# Patient Record
Sex: FEMALE | Race: ASIAN | NOT HISPANIC OR LATINO | Employment: FULL TIME | URBAN - METROPOLITAN AREA
[De-identification: names, ages, dates, MRNs, and addresses within clinical notes are randomized per-mention and may not be internally consistent; named-entity substitution may affect disease eponyms.]

---

## 2017-01-16 ENCOUNTER — ALLIED HEALTH/NURSE VISIT (OUTPATIENT)
Dept: NURSING | Facility: CLINIC | Age: 47
End: 2017-01-16
Payer: COMMERCIAL

## 2017-01-16 DIAGNOSIS — Z23 NEED FOR HEPATITIS B VACCINATION: Primary | ICD-10-CM

## 2017-01-16 PROCEDURE — 90471 IMMUNIZATION ADMIN: CPT

## 2017-01-16 PROCEDURE — 90744 HEPB VACC 3 DOSE PED/ADOL IM: CPT

## 2017-01-16 NOTE — NURSING NOTE
Screening Questionnaire for Adult Immunization    Are you sick today?   No   Do you have allergies to medications, food, a vaccine component or latex?   No   Have you ever had a serious reaction after receiving a vaccination?   No   Do you have a long-term health problem with heart disease, lung disease, asthma, kidney disease, metabolic disease (e.g. diabetes), anemia, or other blood disorder?   No   Do you have cancer, leukemia, HIV/AIDS, or any other immune system problem?   No   In the past 3 months, have you taken medications that affect  your immune system, such as prednisone, other steroids, or anticancer drugs; drugs for the treatment of rheumatoid arthritis, Crohn s disease, or psoriasis; or have you had radiation treatments?   No   Have you had a seizure, or a brain or other nervous system problem?   No   During the past year, have you received a transfusion of blood or blood     products, or been given immune (gamma) globulin or antiviral drug?   No   For women: Are you pregnant or is there a chance you could become        pregnant during the next month?   No   Have you received any vaccinations in the past 4 weeks?   No     Immunization questionnaire answers were all negative.      MNVFC doesn't apply on this patient    Per orders of Dr. Smith, injection of Hep B given by Carmelina Monson. Patient instructed to remain in clinic for 20 minutes afterwards, and to report any adverse reaction to me immediately.       Screening performed by Carmelina Monson on 1/16/2017 at 10:15 AM.

## 2017-02-23 ENCOUNTER — TELEPHONE (OUTPATIENT)
Dept: INTERNAL MEDICINE | Facility: CLINIC | Age: 47
End: 2017-02-23

## 2017-02-23 NOTE — TELEPHONE ENCOUNTER
Prior authorization    Medication name glimepiride  Insurance [facility]  Insurance ID number [id number]  Prior authorization faxed through cover my meds

## 2017-03-01 ENCOUNTER — TELEPHONE (OUTPATIENT)
Dept: INTERNAL MEDICINE | Facility: CLINIC | Age: 47
End: 2017-03-01

## 2017-03-01 NOTE — TELEPHONE ENCOUNTER
Reason for Call:  Medication or medication refill:    Do you use a [facility]?  Name of the pharmacy and phone number for the current request:  [id number] [address] [facility] [phone] Fax [phone]    Name of the medication requested glimetiride 1 mg tablets  lisinotril 10 mgs     Other request needs asap patient is out has been out since Friday 2/24/17  Send message to [facility]     Can we leave a detailed message on this number? YES    Phone number patient can be reached at: Home number on file [phone] (home)    Best Time anytime     Call taken on 3/1/2017 at 4:46 PM by Kathia Garcia

## 2017-03-01 NOTE — TELEPHONE ENCOUNTER
Pt is calling needing refill of both RX glimepiride and lisinopril. Advised pt to call [facility] and ask for refills, because both meds have 3 refills. Call pharmacy not DR office.

## 2017-04-10 ENCOUNTER — OFFICE VISIT (OUTPATIENT)
Dept: INTERNAL MEDICINE | Facility: CLINIC | Age: 47
End: 2017-04-10
Payer: COMMERCIAL

## 2017-04-10 VITALS
BODY MASS INDEX: 23.89 KG/M2 | TEMPERATURE: 98 F | DIASTOLIC BLOOD PRESSURE: 78 MMHG | HEIGHT: 62 IN | SYSTOLIC BLOOD PRESSURE: 126 MMHG | OXYGEN SATURATION: 99 % | HEART RATE: 86 BPM | WEIGHT: 129.8 LBS

## 2017-04-10 DIAGNOSIS — Z23 NEED FOR PNEUMOCOCCAL VACCINATION: ICD-10-CM

## 2017-04-10 DIAGNOSIS — E78.5 HYPERLIPIDEMIA WITH TARGET LDL LESS THAN 130: ICD-10-CM

## 2017-04-10 DIAGNOSIS — R79.89 LIVER FUNCTION TEST ABNORMALITY: ICD-10-CM

## 2017-04-10 DIAGNOSIS — E11.9 TYPE 2 DIABETES MELLITUS WITHOUT COMPLICATION, WITHOUT LONG-TERM CURRENT USE OF INSULIN (H): Primary | ICD-10-CM

## 2017-04-10 LAB
ALBUMIN SERPL-MCNC: 3.8 G/DL (ref 3.4–5)
ALP SERPL-CCNC: 64 U/L (ref 40–150)
ALT SERPL W P-5'-P-CCNC: 41 U/L (ref 0–50)
ANION GAP SERPL CALCULATED.3IONS-SCNC: 9 MMOL/L (ref 3–14)
AST SERPL W P-5'-P-CCNC: 36 U/L (ref 0–45)
BILIRUB SERPL-MCNC: 0.5 MG/DL (ref 0.2–1.3)
BUN SERPL-MCNC: 8 MG/DL (ref 7–30)
CALCIUM SERPL-MCNC: 8.8 MG/DL (ref 8.5–10.1)
CHLORIDE SERPL-SCNC: 102 MMOL/L (ref 94–109)
CHOLEST SERPL-MCNC: 208 MG/DL
CO2 SERPL-SCNC: 26 MMOL/L (ref 20–32)
CREAT SERPL-MCNC: 0.52 MG/DL (ref 0.52–1.04)
GFR SERPL CREATININE-BSD FRML MDRD: ABNORMAL ML/MIN/1.7M2
GLUCOSE SERPL-MCNC: 112 MG/DL (ref 70–99)
HBA1C MFR BLD: 6.6 % (ref 4.3–6)
HDLC SERPL-MCNC: 47 MG/DL
LDLC SERPL CALC-MCNC: 84 MG/DL
NONHDLC SERPL-MCNC: 161 MG/DL
POTASSIUM SERPL-SCNC: 3.8 MMOL/L (ref 3.4–5.3)
PROT SERPL-MCNC: 7.8 G/DL (ref 6.8–8.8)
SODIUM SERPL-SCNC: 137 MMOL/L (ref 133–144)
TRIGL SERPL-MCNC: 387 MG/DL

## 2017-04-10 PROCEDURE — 36415 COLL VENOUS BLD VENIPUNCTURE: CPT | Performed by: INTERNAL MEDICINE

## 2017-04-10 PROCEDURE — 90670 PCV13 VACCINE IM: CPT | Performed by: INTERNAL MEDICINE

## 2017-04-10 PROCEDURE — 83036 HEMOGLOBIN GLYCOSYLATED A1C: CPT | Performed by: INTERNAL MEDICINE

## 2017-04-10 PROCEDURE — 80053 COMPREHEN METABOLIC PANEL: CPT | Performed by: INTERNAL MEDICINE

## 2017-04-10 PROCEDURE — 90471 IMMUNIZATION ADMIN: CPT | Performed by: INTERNAL MEDICINE

## 2017-04-10 PROCEDURE — 80061 LIPID PANEL: CPT | Performed by: INTERNAL MEDICINE

## 2017-04-10 PROCEDURE — 99214 OFFICE O/P EST MOD 30 MIN: CPT | Mod: 25 | Performed by: INTERNAL MEDICINE

## 2017-04-10 RX ORDER — LISINOPRIL 10 MG/1
10 TABLET ORAL DAILY
Qty: 90 TABLET | Refills: 3 | Status: SHIPPED | OUTPATIENT
Start: 2017-04-10 | End: 2017-10-26

## 2017-04-10 RX ORDER — GLIMEPIRIDE 1 MG/1
1 TABLET ORAL
Qty: 90 TABLET | Refills: 3 | Status: SHIPPED | OUTPATIENT
Start: 2017-04-10 | End: 2018-03-14

## 2017-04-10 NOTE — LETTER
[facility]  [address]  [address]      April 10, 2017      Theresa Quarles  [address] [place] [id number]          Dear Theresa,      I have enclosed a copy of your most recent labs done here at the clinic and if available some of your prior labs for comparison.     Your Hemoglobin A1C and blood sugar tests look good and I would continue with your medication without change.  These tests should be repeated in 6 months.    I am pleased to inform you that your routine blood work including your sodium, potassium, calcium, kidney and liver function tests are all normal.    Your triglyceride level is still high and could be treated more aggressively but your LDL level looks good.  Please follow-up with me to discuss your further medication options if you are interested.    Please call me if you have further questions.        Arsenio Smith MD

## 2017-04-10 NOTE — MR AVS SNAPSHOT
After Visit Summary   4/10/2017    Theresa Quarles    MRN: [id number]           Patient Information     Date Of Birth          1970        Visit Information        Provider Department      4/10/2017 9:40 AM Arsenio Smith MD [facility]        Today's Diagnoses     Type 2 diabetes mellitus without complication, without long-term current use of insulin (H)    -  1    Hyperlipidemia with target LDL less than 130        Liver function test abnormality        Need for pneumococcal vaccination           Follow-ups after your visit        Follow-up notes from your care team     Return in about 6 months (around 10/10/2017), or if symptoms worsen or fail to improve, for BP Recheck, Lab Work, Routine Visit.      Your next 10 appointments already scheduled     Jun 16, 2017  9:00 AM CDT   Nurse Only with [facility] - NURSE   [facility] ([facility])    [address] [id number] [phone]              Who to contact     If you have questions or need follow up information about today's clinic visit or your schedule please contact [facility] directly at [phone].  Normal or non-critical lab and imaging results will be communicated to you by MyChart, letter or phone within 4 business days after the clinic has received the results. If you do not hear from us within 7 days, please contact the clinic through [employer] or phone. If you have a critical or abnormal lab result, we will notify you by phone as soon as possible.  Submit refill requests through [email or web address] or call your pharmacy and they will forward the refill request to us. Please allow 3 business days for your refill to be completed.          Additional Information About Your Visit        MyChart Information     [email or web address] gives you secure access to your electronic health record. If you see a primary care provider, you can also send  "messages to your care team and make appointments. If you have questions, please call your primary care clinic.  If you do not have a primary care provider, please call [phone] and they will assist you.        Care EveryWhere ID     This is your Care EveryWhere ID. This could be used by other organizations to access your [place] medical records  [id number]        Your Vitals Were     Pulse Temperature Height Last Period Pulse Oximetry Breastfeeding?    86 98  F (36.7  C) (Oral) 5' 2\" (1.575 m) 03/27/2017 (Approximate) 99% No    BMI (Body Mass Index)                   23.74 kg/m2            Blood Pressure from Last 3 Encounters:   04/10/17 126/78   12/12/16 138/76   11/01/16 126/80    Weight from Last 3 Encounters:   04/10/17 129 lb 12.8 oz (58.9 kg)   12/12/16 129 lb 8 oz (58.7 kg)   11/01/16 130 lb 14.4 oz (59.4 kg)              We Performed the Following     ADMIN: Vaccine, Initial (64816)     Comprehensive metabolic panel     Hemoglobin A1c     Lipid Profile     Pneumococcal vaccine 13 valent PCV13 IM (Prevnar) [17978]          Where to get your medicines      [facility] [address] AT [facility]  [address], [facility] [address]     Phone:  [phone]     glimepiride 1 MG tablet    lisinopril 10 MG tablet    metFORMIN 500 MG tablet          Primary Care Provider Office Phone # Fax #    Arsenio Smith [phone]       [facility] [address] [facility] [address]        Thank you!     Thank you for choosing [facility]  for your care. Our goal is always to provide you with excellent care. Hearing back from our patients is one way we can continue to improve our services. Please take a few minutes to complete the written survey that you may receive in the mail after your visit with us. Thank you!             Your Updated Medication List - " Protect others around you: Learn how to safely use, store and throw away your medicines at www.disposemymeds.org.          This list is accurate as of: 4/10/17 10:04 AM.  Always use your most recent med list.                   Brand Name Dispense Instructions for use    blood glucose monitoring meter device kit     1 kit    Use to test blood sugars 3 times daily or as directed with appropriate meter test strips and lancets and ETOH swabs, # 1 month, refill times 11       CINNAMON PO      Take  by mouth.       drospirenone-ethinyl estradiol 3-0.02 MG per tablet    DAE    84 tablet    Take 1 tablet by mouth daily       glimepiride 1 MG tablet    AMARYL    90 tablet    Take 1 tablet (1 mg) by mouth every morning (before breakfast)       lisinopril 10 MG tablet    PRINIVIL/ZESTRIL    90 tablet    Take 1 tablet (10 mg) by mouth daily       metFORMIN 500 MG tablet    GLUCOPHAGE    360 tablet    Take 2 tablets (1,000 mg) by mouth 2 times daily (with meals)       multivitamin per tablet     100 Tab    ONE DAILY       STATIN NOT PRESCRIBED (INTENTIONAL)     0 each    Statin not prescribed intentionally due to Active liver disease (fatty liver)

## 2017-04-10 NOTE — PROGRESS NOTES
SUBJECTIVE:                                                    Theresa Quarles is a 47 year old female who presents to clinic today for the following health issues:    Prevnar- DUE    Diabetes Follow-up      Patient is checking blood sugars: not at all    Diabetic concerns: None     Symptoms of hypoglycemia (low blood sugar): none     Paresthesias (numbness or burning in feet) or sores: Yes- sore on L foot     Date of last diabetic eye exam:        Amount of exercise or physical activity: 4-5 days/week for an average of 45-60 minutes    Problems taking medications regularly: YES- switched pharmacies and was not sure how to transfer medications- has been out x 1 week     Medication side effects: none    Diet: regular (no restrictions)      Problem list and histories reviewed & adjusted, as indicated.  Additional history: as documented    Patient Active Problem List   Diagnosis     Gestational diabetes mellitus, antepartum     Liver function test abnormality     Hyperlipidemia with target LDL less than 130     History of gestational diabetes mellitus, not currently pregnant     Butler's metatarsalgia, neuralgia, or neuroma     Foot joint pain     Abnormality of gait     Other postprocedural status(V45.89)     Other peripheral enthesopathies     Fatty liver     Pyelonephritis     General counseling for prescription of oral contraceptives     Type 2 diabetes mellitus without complication, without long-term current use of insulin (H)     Past Surgical History:   Procedure Laterality Date     BIOPSY BREAST  2012    needle bx benign     HC EXCISE HAND/FOOT NEUROMA       HC REMOVAL OF TONSILS,14+ Y/O      Tonsils 14+y.o.       Social History   Substance Use Topics     Smoking status: Never Smoker     Smokeless tobacco: Never Used     Alcohol use No     Family History   Problem Relation Age of Onset     CEREBROVASCULAR DISEASE Father           Thyroid Disease Mother      s/p thyrodectomy     Cancer -  colorectal Mother      rectal cancer         Current Outpatient Prescriptions   Medication Sig Dispense Refill     metFORMIN (GLUCOPHAGE) 500 MG tablet Take 2 tablets (1,000 mg) by mouth 2 times daily (with meals) 360 tablet 3     glimepiride (AMARYL) 1 MG tablet Take 1 tablet (1 mg) by mouth every morning (before breakfast) 90 tablet 3     lisinopril (PRINIVIL/ZESTRIL) 10 MG tablet Take 1 tablet (10 mg) by mouth daily 90 tablet 3     STATIN NOT PRESCRIBED, INTENTIONAL, Statin not prescribed intentionally due to Active liver disease (fatty liver) 0 each 0     blood glucose monitoring (ACCU-CHEK BECKY PLUS) meter device kit Use to test blood sugars 3 times daily or as directed with appropriate meter test strips and lancets and ETOH swabs, # 1 month, refill times 11 1 kit 5     drospirenone-ethinyl estradiol (DAE) 3-0.02 MG per tablet Take 1 tablet by mouth daily 84 tablet 3     CINNAMON PO Take  by mouth.       MULTIVITAMINS PO TABS ONE DAILY 100 Tab 3     Allergies   Allergen Reactions     No Known Allergies      Recent Labs   Lab Test  10/24/16   1025  05/07/15   0859  04/27/15   1705   08/31/12   1039  05/21/12   1049   08/23/10   0919 08/09/10   A1C  8.3*   --    --    --   6.5*   --    --    --   5.9 @   LDL  122*   --    --    --   106  121   < >   --    --    HDL  47*   --    --    --   58  61   < >   --    --    TRIG  303*   --    --    --   146  265*   < >   --    --    ALT  79*  69*  40   < >  70*  60*   < >   --    --    CR  0.50*  0.57  0.51*   --   0.64   --    < >   --    --    GFRESTIMATED  >90  Non  GFR Calc    >90  Non  GFR Calc    >90  Non  GFR Calc     --   >90   --    < >   --    --    GFRESTBLACK  >90   GFR Calc    >90   GFR Calc    >90   GFR Calc     --   >90   --    < >   --    --    POTASSIUM  3.6  3.7  3.6   --   4.2   --    < >   --    --    TSH  3.99   --    --    --    --    --    --   1.63   --  "    < > = values in this interval not displayed.      BP Readings from Last 3 Encounters:   12/12/16 138/76   11/01/16 126/80   10/24/16 122/80    Wt Readings from Last 3 Encounters:   12/12/16 129 lb 8 oz (58.7 kg)   11/01/16 130 lb 14.4 oz (59.4 kg)   10/24/16 130 lb 11.2 oz (59.3 kg)            Labs reviewed in EPIC    Reviewed and updated as needed this visit by clinical staff  Tobacco  Allergies  Med Hx  Surg Hx  Fam Hx  Soc Hx      Reviewed and updated as needed this visit by Provider         ROS:  C: NEGATIVE for fever, chills, change in weight  E/M: NEGATIVE for ear, mouth and throat problems  R: NEGATIVE for significant cough or SOB  CV: NEGATIVE for chest pain, palpitations or peripheral edema  GI: NEGATIVE for nausea, abdominal pain, heartburn, or change in bowel habits  : NEGATIVE for frequency, dysuria, or hematuria  M: NEGATIVE for significant arthralgias or myalgia  H: NEGATIVE for bleeding problems  P: NEGATIVE for changes in mood or affect    OBJECTIVE:                                                    /78  Pulse 86  Temp 98  F (36.7  C) (Oral)  Ht 5' 2\" (1.575 m)  Wt 129 lb 12.8 oz (58.9 kg)  LMP 03/27/2017 (Approximate)  SpO2 99%  Breastfeeding? No  BMI 23.74 kg/m2  Body mass index is 23.74 kg/(m^2).  GENERAL: healthy, alert and no distress  EYES: Eyes grossly normal to inspection, extraocular movements - intact, and PERRL  HENT: ear canals- normal; TMs- normal; Nose- normal; Mouth- no ulcers, no lesions  NECK: no tenderness, no adenopathy, no asymmetry, no masses, no stiffness; thyroid- normal to palpation  RESP: lungs clear to auscultation - no rales, no rhonchi, no wheezes  CV: regular rates and rhythm, normal S1 S2, no S3 or S4 and no murmur, no click or rub -  ABDOMEN: soft, no tenderness, no  hepatosplenomegaly, no masses, normal bowel sounds  MS: extremities- no gross deformities noted, no edema  NEURO: no focal changes  PSYCH: Alert and oriented times 3; speech- " coherent , normal rate and volume; able to articulate logical thoughts, able to abstract reason, no tangential thoughts, no hallucinations or delusions, affect- normal     ASSESSMENT/PLAN:                                                      (E11.9) Type 2 diabetes mellitus without complication, without long-term current use of insulin (H)  (primary encounter diagnosis)  Comment: advised checking BS at home  Plan: glimepiride (AMARYL) 1 MG tablet, lisinopril         (PRINIVIL/ZESTRIL) 10 MG tablet, metFORMIN         (GLUCOPHAGE) 500 MG tablet, Comprehensive         metabolic panel, Lipid Profile, Hemoglobin A1c            (E78.5) Hyperlipidemia with target LDL less than 130  Comment: labs ordered fasting  Plan: Comprehensive metabolic panel, Lipid Profile            (R79.89) Liver function test abnormality  Comment: repeat labs,   Plan: noted prior US with fatty liver    (Z23) Need for pneumococcal vaccination  Comment: updated  Plan: Pneumococcal vaccine 13 valent PCV13 IM         (Prevnar) [12742], ADMIN: Vaccine, Initial         (88721)            See Patient Instructions    Arsenio Smith MD  [facility]    THE MEDICATION LIST HAS BEEN FULLY RECONCILED BY THE M.D. AND THE NURSING STAFF.    25 minutes spent with this patient, face to face, discussing treatment options for listed problems above as well as side effects of appropriate medications.  Counseling time extended beyond 50% of the clinic visit.  Medication dosing, treatment plan and follow-up were discussed. Also reviewed need for primary care testing for patient.

## 2017-05-25 ENCOUNTER — OFFICE VISIT (OUTPATIENT)
Dept: URGENT CARE | Facility: URGENT CARE | Age: 47
End: 2017-05-25
Payer: COMMERCIAL

## 2017-05-25 VITALS
SYSTOLIC BLOOD PRESSURE: 108 MMHG | DIASTOLIC BLOOD PRESSURE: 70 MMHG | HEART RATE: 84 BPM | WEIGHT: 128 LBS | BODY MASS INDEX: 23.41 KG/M2 | OXYGEN SATURATION: 99 % | TEMPERATURE: 98.2 F

## 2017-05-25 DIAGNOSIS — E11.9 TYPE 2 DIABETES MELLITUS WITHOUT COMPLICATION, WITHOUT LONG-TERM CURRENT USE OF INSULIN (H): ICD-10-CM

## 2017-05-25 DIAGNOSIS — I10 ESSENTIAL HYPERTENSION: ICD-10-CM

## 2017-05-25 DIAGNOSIS — T78.3XXA ANGIOEDEMA OF LIPS, INITIAL ENCOUNTER: Primary | ICD-10-CM

## 2017-05-25 PROCEDURE — 99214 OFFICE O/P EST MOD 30 MIN: CPT | Performed by: PHYSICIAN ASSISTANT

## 2017-05-25 RX ORDER — METHYLPREDNISOLONE 4 MG
TABLET, DOSE PACK ORAL
Qty: 21 TABLET | Refills: 0 | Status: SHIPPED | OUTPATIENT
Start: 2017-05-25 | End: 2017-10-26

## 2017-05-25 RX ORDER — CETIRIZINE HYDROCHLORIDE 10 MG/1
10 TABLET ORAL EVERY EVENING
Qty: 30 TABLET | Refills: 0 | Status: SHIPPED | OUTPATIENT
Start: 2017-05-25 | End: 2017-10-26

## 2017-05-25 NOTE — PROGRESS NOTES
SUBJECTIVE:   Theresa Quarles is a 47 year old female presenting with a chief complaint of suddenly having swollen lower lip and tightness  This suddenly started last night    Past Medical History:   Diagnosis Date     Fatty liver 5/21/2012     GBS (group B streptococcus) infection 9/4/2008     gestational diabetes      Gestational diabetes mellitus, antepartum 3/9/2009     Hyperlipidemia LDL goal < 130 7/13/2010     Liver function test abnormality 7/13/2010     Pregnancies, high-risk 3/19/2009        Allergies   Allergen Reactions     No Known Allergies          Social History   Substance Use Topics     Smoking status: Never Smoker     Smokeless tobacco: Never Used     Alcohol use No       ROS:  CONSTITUTIONAL:NEGATIVE for fever, chills, change in weight  INTEGUMENTARY/SKIN: POSITIVE for swelling and tightness of entire lower lip  ENT/MOUTH: Positive for lower lip  RESP:NEGATIVE for significant cough or SOB  CV: NEGATIVE for chest pain, palpitations or peripheral edema  MUSCULOSKELETAL: NEGATIVE for significant arthralgias or myalgia  NEURO: NEGATIVE for weakness, dizziness or paresthesias    OBJECTIVE  :/70 (BP Location: Right arm, Patient Position: Chair, Cuff Size: Adult Regular)  Pulse 84  Temp 98.2  F (36.8  C) (Oral)  Wt 128 lb (58.1 kg)  SpO2 99%  BMI 23.41 kg/m2  GENERAL APPEARANCE: healthy, alert and no distress  EYES: EOMI,  PERRL, conjunctiva clear  HENT: Positive for swelling of lower lip  NECK: supple, nontender, no lymphadenopathy  RESP: lungs clear to auscultation - no rales, rhonchi or wheezes  CV: regular rates and rhythm, normal S1 S2, no murmur noted  NEURO: Normal strength and tone, sensory exam grossly normal,  normal speech and mentation  SKIN: Positive for swelling and tightness of entire lower lip    ASSESSMENT/PLAN:    ICD-10-CM    1. Angioedema of lips, initial encounter T78.3XXA methylPREDNISolone (MEDROL DOSEPAK) 4 MG tablet     cetirizine (ZYRTEC) 10 MG tablet   2. Essential  hypertension I10 Lisinopril   3. Type 2 diabetes mellitus without complication, without long-term current use of insulin (H) E11.9        We decided to do a medrol dosepak along with zyrtec due to her lower lip was so swollen and tight  Discussed her ACE could be the culprit for angioedema and if this reoccurs to discuss switching lisinopril  Follow up with PCP for this change in BP medications

## 2017-05-25 NOTE — NURSING NOTE
"Chief Complaint   Patient presents with     Urgent Care     discovered a white spot on lower lip last night and threw out the night, lower lip became swollen        Initial /70 (BP Location: Right arm, Patient Position: Chair, Cuff Size: Adult Regular)  Pulse 84  Temp 98.2  F (36.8  C) (Oral)  Wt 128 lb (58.1 kg)  SpO2 99%  BMI 23.41 kg/m2 Estimated body mass index is 23.41 kg/(m^2) as calculated from the following:    Height as of 4/10/17: 5' 2\" (1.575 m).    Weight as of this encounter: 128 lb (58.1 kg).  Medication Reconciliation: complete   Nica Huber MA      "

## 2017-05-25 NOTE — MR AVS SNAPSHOT
After Visit Summary   5/25/2017    Theresa Quarles    MRN: [id number]           Patient Information     Date Of Birth          1970        Visit Information        Provider Department      5/25/2017 9:10 AM Sage Birmingham PA-C [facility]        Today's Diagnoses     Angioedema of lips, initial encounter    -  1    Essential hypertension        Type 2 diabetes mellitus without complication, without long-term current use of insulin (H)          Care Instructions      Angioedema  Angioedema (pronounced  qd-ypo-k-edema ) is a sudden appearance of swollen patches (edema) on the skin or mucous membranes. It most often involves the face, lips, mouth, tongue, back of throat, or vocal cords. It may also occur in other places, such as the arms or legs. A rash may also appear during the first 4 days of this illness.  Symptoms  There are different types of angioedema. Your symptoms will depend on what type of angioedema you have. Swelling and redness may be the main symptoms. Like allergic reactions, angioedema may include:    Rash, hives, redness, welts, blisters    Itching, burning, stinging, pain    Dry, flaky, cracking, or scaly skin    Swelling of the face, lips, or other parts of the body  More severe symptoms may include:    Trouble swallowing, or feeling like your throat is closing    Trouble breathing or wheezing    Hoarse voice or trouble speaking    Nausea, vomiting, diarrhea, or stomach cramps    Feeling faint or lightheaded, rapid heart rate, or low blood pressure  Causes   Angioedema can be triggered by exposure to certain substances. Medical conditions involving the immune systems and certain infections may cause it. In rare cases, angioedema can be hereditary. Sometimes the cause may be very clear. However, it is often hard to find a cause. The most common causes include:    Foods, such as shrimp, shellfish, peanuts, milk products, gluten, and eggs; also colorings,  flavorings, and additives    Insect bites or stings, from bees, mosquitos, fleas, or ticks    Medicines, such as ACE inhibitors, penicillin, sulfa drugs, amoxicillin, aspirin, and ibuprofen    Latex, which may be in gloves, clothes, toys, balloons, and some kinds of tape. People who are allergic to latex may have problems with foods such as bananas, avocados, kiwi, papaya, or chestnuts.    Stress    Heat, cold, or sunlight  The most common cause of angioedema is a reaction to a class of medicines called ACE inhibitors. These are used to treat high blood pressure. ACE inhibitors include captopril, enalapril, and lisinopril. Tell your doctor if you have angioedema symptoms and are taking any of these medicines.  Angioedema may recur. It is important to watch for the earliest signs of this condition (see the list below). Contact your healthcare provider right away if swelling involves the face, mouth, or throat.  Home care  Rest quietly today. Avoid vigorous physical activity.  Medicines: The healthcare provider may prescribe medicines for itching, swelling, or pain. Follow the healthcare provider s instructions when taking these medicines.    Oral diphenhydramine is an antihistamine available without a prescription. Unless a prescription antihistamine was given, diphenhydramine may be used to reduce widespread itching. It may make you sleepy, so be careful using it when going to school, working, or driving. (Note: Do not use diphenhydramine if you have glaucoma or if you are a man who has trouble urinating due to an enlarged prostate.) Loratadine is an antihistamine that may cause less drowsiness.    Do not use diphenhydramine cream on your skin. Some people can have an allergic reaction to this.    Calamine lotion or oatmeal baths sometimes help with itching.    You may use acetaminophen or ibuprofen for pain, unless another pain medicine was prescribed.    If you were told that your angioedema was caused by a  medicine you are taking, you must stop taking it. Ask your healthcare provider for a different one. In the future, advise medical staff that you are allergic to this medicine.    If medicine was prescribed to treat angioedema (such as steroids or antihistamines), be sure you understand what the medicine is and how to take it. Then, take it as directed.  Follow-up care  Follow up with your healthcare provider, or as advised.  Call 911  Contact emergency services right away if any of these occur:    Trouble breathing or swallowing, or wheezing    Hoarse voice or trouble speaking    Chest pain    Confusion    Extreme drowsiness or trouble awakening    Fainting or loss of consciousness    Rapid heart rate    Vomiting blood, or large amounts of blood in stool    Seizure  When to seek medical attention  Call your healthcare provider right away if any of the following occur:    Increase in swelling of the lips, mouth, or tongue    Severe abdominal pain    6935-1645 Posted[employer]. [address] [place] [address]. All rights reserved. This information is not intended as a substitute for professional medical care. Always follow your healthcare professional's instructions.                Follow-ups after your visit        Your next 10 appointments already scheduled     Jun 16, 2017  9:00 AM CDT   Nurse Only with [facility] - [facility]URSE   [facility]    [address] [address] [phone]              Who to contact     If you have questions or need follow up information about today's clinic visit or your schedule please contact [facility] directly at [phone].  Normal or non-critical lab and imaging results will be communicated to you by MyChart, letter or phone within 4 business days after the clinic has received the results. If you do not hear from us within 7 days, please contact the  clinic through TransNet or phone. If you have a critical or abnormal lab result, we will notify you by phone as soon as possible.  Submit refill requests through TransNet or call your pharmacy and they will forward the refill request to us. Please allow 3 business days for your refill to be completed.          Additional Information About Your Visit        VSoftharColingo Information     TransNet gives you secure access to your electronic health record. If you see a primary care provider, you can also send messages to your care team and make appointments. If you have questions, please call your primary care clinic.  If you do not have a primary care provider, please call [phone] and they will assist you.        Care EveryWhere ID     This is your Care EveryWhere ID. This could be used by other organizations to access your [place] medical records  [id number]        Your Vitals Were     Pulse Temperature Pulse Oximetry BMI (Body Mass Index)          84 98.2  F (36.8  C) (Oral) 99% 23.41 kg/m2         Blood Pressure from Last 3 Encounters:   05/25/17 108/70   04/10/17 126/78   12/12/16 138/76    Weight from Last 3 Encounters:   05/25/17 128 lb (58.1 kg)   04/10/17 129 lb 12.8 oz (58.9 kg)   12/12/16 129 lb 8 oz (58.7 kg)              Today, you had the following     No orders found for display         Today's Medication Changes          These changes are accurate as of: 5/25/17  9:32 AM.  If you have any questions, ask your nurse or doctor.               Start taking these medicines.        Dose/Directions    cetirizine 10 MG tablet   Commonly known as:  zyrTEC   Used for:  Angioedema of lips, initial encounter   Started by:  Sage Birmingham PA-C        Dose:  10 mg   Take 1 tablet (10 mg) by mouth every evening   Quantity:  30 tablet   Refills:  0       methylPREDNISolone 4 MG tablet   Commonly known as:  MEDROL DOSEPAK   Used for:  Angioedema of lips, initial encounter   Started by:  Sage Birmingham PA-C        Follow  package instructions   Quantity:  21 tablet   Refills:  0            Where to get your medicines      These medications were sent to [email or web address] - [place], [place] - [address] [facility] [address]  [address] [facility][1] [address]    Hours:  24-hours Phone:  [phone]     cetirizine 10 MG tablet    methylPREDNISolone 4 MG tablet                Primary Care Provider Office Phone # Fax #    Arsenio Smith [phone]       [facility] [address]  [facility][1] [id number]        Thank you!     Thank you for choosing [facility]  for your care. Our goal is always to provide you with excellent care. Hearing back from our patients is one way we can continue to improve our services. Please take a few minutes to complete the written survey that you may receive in the mail after your visit with us. Thank you!             Your Updated Medication List - Protect others around you: Learn how to safely use, store and throw away your medicines at www.disposemymeds.org.          This list is accurate as of: 5/25/17  9:32 AM.  Always use your most recent med list.                   Brand Name Dispense Instructions for use    blood glucose monitoring meter device kit     1 kit    Use to test blood sugars 3 times daily or as directed with appropriate meter test strips and lancets and ETOH swabs, # 1 month, refill times 11       cetirizine 10 MG tablet    zyrTEC    30 tablet    Take 1 tablet (10 mg) by mouth every evening       CINNAMON PO      Take  by mouth.       drospirenone-ethinyl estradiol 3-0.02 MG per tablet    DAE    84 tablet    Take 1 tablet by mouth daily       glimepiride 1 MG tablet    AMARYL    90 tablet    Take 1 tablet (1 mg) by mouth every morning (before breakfast)       lisinopril 10 MG tablet    PRINIVIL/ZESTRIL    90 tablet    Take 1 tablet (10 mg) by mouth daily       metFORMIN 500 MG tablet    GLUCOPHAGE    360  tablet    Take 2 tablets (1,000 mg) by mouth 2 times daily (with meals)       methylPREDNISolone 4 MG tablet    MEDROL DOSEPAK    21 tablet    Follow package instructions       multivitamin per tablet     100 Tab    ONE DAILY       STATIN NOT PRESCRIBED (INTENTIONAL)     0 each    Statin not prescribed intentionally due to Active liver disease (fatty liver)

## 2017-05-25 NOTE — PATIENT INSTRUCTIONS
Angioedema  Angioedema (pronounced  pr-zcs-j-edema ) is a sudden appearance of swollen patches (edema) on the skin or mucous membranes. It most often involves the face, lips, mouth, tongue, back of throat, or vocal cords. It may also occur in other places, such as the arms or legs. A rash may also appear during the first 4 days of this illness.  Symptoms  There are different types of angioedema. Your symptoms will depend on what type of angioedema you have. Swelling and redness may be the main symptoms. Like allergic reactions, angioedema may include:    Rash, hives, redness, welts, blisters    Itching, burning, stinging, pain    Dry, flaky, cracking, or scaly skin    Swelling of the face, lips, or other parts of the body  More severe symptoms may include:    Trouble swallowing, or feeling like your throat is closing    Trouble breathing or wheezing    Hoarse voice or trouble speaking    Nausea, vomiting, diarrhea, or stomach cramps    Feeling faint or lightheaded, rapid heart rate, or low blood pressure  Causes   Angioedema can be triggered by exposure to certain substances. Medical conditions involving the immune systems and certain infections may cause it. In rare cases, angioedema can be hereditary. Sometimes the cause may be very clear. However, it is often hard to find a cause. The most common causes include:    Foods, such as shrimp, shellfish, peanuts, milk products, gluten, and eggs; also colorings, flavorings, and additives    Insect bites or stings, from bees, mosquitos, fleas, or ticks    Medicines, such as ACE inhibitors, penicillin, sulfa drugs, amoxicillin, aspirin, and ibuprofen    Latex, which may be in gloves, clothes, toys, balloons, and some kinds of tape. People who are allergic to latex may have problems with foods such as bananas, avocados, kiwi, papaya, or chestnuts.    Stress    Heat, cold, or sunlight  The most common cause of angioedema is a reaction to a class of medicines called ACE  inhibitors. These are used to treat high blood pressure. ACE inhibitors include captopril, enalapril, and lisinopril. Tell your doctor if you have angioedema symptoms and are taking any of these medicines.  Angioedema may recur. It is important to watch for the earliest signs of this condition (see the list below). Contact your healthcare provider right away if swelling involves the face, mouth, or throat.  Home care  Rest quietly today. Avoid vigorous physical activity.  Medicines: The healthcare provider may prescribe medicines for itching, swelling, or pain. Follow the healthcare provider s instructions when taking these medicines.    Oral diphenhydramine is an antihistamine available without a prescription. Unless a prescription antihistamine was given, diphenhydramine may be used to reduce widespread itching. It may make you sleepy, so be careful using it when going to school, working, or driving. (Note: Do not use diphenhydramine if you have glaucoma or if you are a man who has trouble urinating due to an enlarged prostate.) Loratadine is an antihistamine that may cause less drowsiness.    Do not use diphenhydramine cream on your skin. Some people can have an allergic reaction to this.    Calamine lotion or oatmeal baths sometimes help with itching.    You may use acetaminophen or ibuprofen for pain, unless another pain medicine was prescribed.    If you were told that your angioedema was caused by a medicine you are taking, you must stop taking it. Ask your healthcare provider for a different one. In the future, advise medical staff that you are allergic to this medicine.    If medicine was prescribed to treat angioedema (such as steroids or antihistamines), be sure you understand what the medicine is and how to take it. Then, take it as directed.  Follow-up care  Follow up with your healthcare provider, or as advised.  Call 911  Contact emergency services right away if any of these occur:    Trouble  breathing or swallowing, or wheezing    Hoarse voice or trouble speaking    Chest pain    Confusion    Extreme drowsiness or trouble awakening    Fainting or loss of consciousness    Rapid heart rate    Vomiting blood, or large amounts of blood in stool    Seizure  When to seek medical attention  Call your healthcare provider right away if any of the following occur:    Increase in swelling of the lips, mouth, or tongue    Severe abdominal pain    3602-3058 [employer]. [address] [place] [address]. All rights reserved. This information is not intended as a substitute for professional medical care. Always follow your healthcare professional's instructions.

## 2017-06-16 ENCOUNTER — ALLIED HEALTH/NURSE VISIT (OUTPATIENT)
Dept: NURSING | Facility: CLINIC | Age: 47
End: 2017-06-16
Payer: COMMERCIAL

## 2017-06-16 DIAGNOSIS — Z23 NEED FOR VACCINATION: Primary | ICD-10-CM

## 2017-06-16 PROCEDURE — 90471 IMMUNIZATION ADMIN: CPT

## 2017-06-16 PROCEDURE — 90746 HEPB VACCINE 3 DOSE ADULT IM: CPT

## 2017-06-16 NOTE — NURSING NOTE
Screening Questionnaire for Adult Immunization    Are you sick today?   No   Do you have allergies to medications, food, a vaccine component or latex?   No   Have you ever had a serious reaction after receiving a vaccination?   No   Do you have a long-term health problem with heart disease, lung disease, asthma, kidney disease, metabolic disease (e.g. diabetes), anemia, or other blood disorder?   No   Do you have cancer, leukemia, HIV/AIDS, or any other immune system problem?   No   In the past 3 months, have you taken medications that affect  your immune system, such as prednisone, other steroids, or anticancer drugs; drugs for the treatment of rheumatoid arthritis, Crohn s disease, or psoriasis; or have you had radiation treatments?   No   Have you had a seizure, or a brain or other nervous system problem?   No   During the past year, have you received a transfusion of blood or blood     products, or been given immune (gamma) globulin or antiviral drug?   No   For women: Are you pregnant or is there a chance you could become        pregnant during the next month?   No   Have you received any vaccinations in the past 4 weeks?   No     Immunization questionnaire answers were all negative.      MNVFC doesn't apply on this patient    Per orders of Dr. Soto, injection of Hep B given by Betty Cisneros. Patient instructed to remain in clinic for 20 minutes afterwards, and to report any adverse reaction to me immediately.       Screening performed by Betty Cisneros on 6/16/2017 at 11:12 AM.    Prior to injection verified patient identity using patient's name and date of birth.  Per orders of Dr. Soto, injection of Hep B given by Betty Cisneros. Patient instructed to remain in clinic for 20 minutes afterwards, and to report any adverse reaction to me immediately.    Chief Complaint   Patient presents with     Imm/Inj       Initial There were no vitals taken for this visit. Estimated body mass index is 23.41  "kg/(m^2) as calculated from the following:    Height as of 4/10/17: 5' 2\" (1.575 m).    Weight as of 5/25/17: 128 lb (58.1 kg).  Medication Reconciliation: complete   Betty Cisneros MA   "

## 2017-06-16 NOTE — MR AVS SNAPSHOT
After Visit Summary   6/16/2017    Theresa Quarles    MRN: [id number]           Patient Information     Date Of Birth          1970        Visit Information        Provider Department      6/16/2017 9:00 AM [facility] - NURSE [facility]        Today's Diagnoses     Need for vaccination    -  1       Follow-ups after your visit        Who to contact     If you have questions or need follow up information about today's clinic visit or your schedule please contact [facility] directly at [phone].  Normal or non-critical lab and imaging results will be communicated to you by Crashmobhart, letter or phone within 4 business days after the clinic has received the results. If you do not hear from us within 7 days, please contact the clinic through Apptimatet or phone. If you have a critical or abnormal lab result, we will notify you by phone as soon as possible.  Submit refill requests through Movigo or call your pharmacy and they will forward the refill request to us. Please allow 3 business days for your refill to be completed.          Additional Information About Your Visit        MyChart Information     Movigo gives you secure access to your electronic health record. If you see a primary care provider, you can also send messages to your care team and make appointments. If you have questions, please call your primary care clinic.  If you do not have a primary care provider, please call [phone] and they will assist you.        Care EveryWhere ID     This is your Care EveryWhere ID. This could be used by other organizations to access your [place] medical records  [id number]         Blood Pressure from Last 3 Encounters:   05/25/17 108/70   04/10/17 126/78   12/12/16 138/76    Weight from Last 3 Encounters:   05/25/17 128 lb (58.1 kg)   04/10/17 129 lb 12.8 oz (58.9 kg)   12/12/16 129 lb 8 oz (58.7 kg)              We Performed the Following      HEPATITIS B VACCINE,ADULT,IM     VACCINE ADMINISTRATION, INITIAL        Primary Care Provider Office Phone # Fax #    Arsenio Smith [phone]       [facility] [address] [facility] [address]        Thank you!     Thank you for choosing [facility]  for your care. Our goal is always to provide you with excellent care. Hearing back from our patients is one way we can continue to improve our services. Please take a few minutes to complete the written survey that you may receive in the mail after your visit with us. Thank you!             Your Updated Medication List - Protect others around you: Learn how to safely use, store and throw away your medicines at www.disposemymeds.org.          This list is accurate as of: 6/16/17 11:26 AM.  Always use your most recent med list.                   Brand Name Dispense Instructions for use    blood glucose monitoring meter device kit     1 kit    Use to test blood sugars 3 times daily or as directed with appropriate meter test strips and lancets and ETOH swabs, # 1 month, refill times 11       cetirizine 10 MG tablet    zyrTEC    30 tablet    Take 1 tablet (10 mg) by mouth every evening       CINNAMON PO      Take  by mouth.       drospirenone-ethinyl estradiol 3-0.02 MG per tablet    DAE    84 tablet    Take 1 tablet by mouth daily       glimepiride 1 MG tablet    AMARYL    90 tablet    Take 1 tablet (1 mg) by mouth every morning (before breakfast)       lisinopril 10 MG tablet    PRINIVIL/ZESTRIL    90 tablet    Take 1 tablet (10 mg) by mouth daily       metFORMIN 500 MG tablet    GLUCOPHAGE    360 tablet    Take 2 tablets (1,000 mg) by mouth 2 times daily (with meals)       methylPREDNISolone 4 MG tablet    MEDROL DOSEPAK    21 tablet    Follow package instructions       multivitamin per tablet     100 Tab    ONE DAILY       STATIN NOT PRESCRIBED (INTENTIONAL)     0 each    Statin not prescribed intentionally  due to Active liver disease (fatty liver)

## 2017-10-26 ENCOUNTER — OFFICE VISIT (OUTPATIENT)
Dept: INTERNAL MEDICINE | Facility: CLINIC | Age: 47
End: 2017-10-26
Payer: COMMERCIAL

## 2017-10-26 VITALS
BODY MASS INDEX: 24.11 KG/M2 | HEIGHT: 62 IN | DIASTOLIC BLOOD PRESSURE: 78 MMHG | OXYGEN SATURATION: 98 % | HEART RATE: 92 BPM | WEIGHT: 131 LBS | TEMPERATURE: 98 F | SYSTOLIC BLOOD PRESSURE: 116 MMHG

## 2017-10-26 DIAGNOSIS — Z23 NEED FOR PROPHYLACTIC VACCINATION AND INOCULATION AGAINST INFLUENZA: ICD-10-CM

## 2017-10-26 DIAGNOSIS — Z30.41 ENCOUNTER FOR SURVEILLANCE OF CONTRACEPTIVE PILLS: ICD-10-CM

## 2017-10-26 DIAGNOSIS — E78.5 HYPERLIPIDEMIA WITH TARGET LDL LESS THAN 130: ICD-10-CM

## 2017-10-26 DIAGNOSIS — Z00.00 ENCOUNTER FOR ROUTINE ADULT HEALTH EXAMINATION WITHOUT ABNORMAL FINDINGS: Primary | ICD-10-CM

## 2017-10-26 DIAGNOSIS — R55 SYNCOPE, UNSPECIFIED SYNCOPE TYPE: ICD-10-CM

## 2017-10-26 DIAGNOSIS — E11.9 TYPE 2 DIABETES MELLITUS WITHOUT COMPLICATION, WITHOUT LONG-TERM CURRENT USE OF INSULIN (H): ICD-10-CM

## 2017-10-26 LAB
ANION GAP SERPL CALCULATED.3IONS-SCNC: 11 MMOL/L (ref 3–14)
BUN SERPL-MCNC: 8 MG/DL (ref 7–30)
CALCIUM SERPL-MCNC: 9.8 MG/DL (ref 8.5–10.1)
CHLORIDE SERPL-SCNC: 97 MMOL/L (ref 94–109)
CO2 SERPL-SCNC: 27 MMOL/L (ref 20–32)
CREAT SERPL-MCNC: 0.5 MG/DL (ref 0.52–1.04)
GFR SERPL CREATININE-BSD FRML MDRD: >90 ML/MIN/1.7M2
GLUCOSE SERPL-MCNC: 100 MG/DL (ref 70–99)
HBA1C MFR BLD: 6.9 % (ref 4.3–6)
HGB BLD-MCNC: 12.3 G/DL (ref 11.7–15.7)
POTASSIUM SERPL-SCNC: 3.7 MMOL/L (ref 3.4–5.3)
SODIUM SERPL-SCNC: 135 MMOL/L (ref 133–144)
TSH SERPL DL<=0.005 MIU/L-ACNC: 3.87 MU/L (ref 0.4–4)

## 2017-10-26 PROCEDURE — 90686 IIV4 VACC NO PRSV 0.5 ML IM: CPT | Performed by: INTERNAL MEDICINE

## 2017-10-26 PROCEDURE — 99214 OFFICE O/P EST MOD 30 MIN: CPT | Mod: 25 | Performed by: INTERNAL MEDICINE

## 2017-10-26 PROCEDURE — 90471 IMMUNIZATION ADMIN: CPT | Performed by: INTERNAL MEDICINE

## 2017-10-26 PROCEDURE — 99396 PREV VISIT EST AGE 40-64: CPT | Mod: 25 | Performed by: INTERNAL MEDICINE

## 2017-10-26 PROCEDURE — 83036 HEMOGLOBIN GLYCOSYLATED A1C: CPT | Performed by: INTERNAL MEDICINE

## 2017-10-26 PROCEDURE — 36415 COLL VENOUS BLD VENIPUNCTURE: CPT | Performed by: INTERNAL MEDICINE

## 2017-10-26 PROCEDURE — 84443 ASSAY THYROID STIM HORMONE: CPT | Performed by: INTERNAL MEDICINE

## 2017-10-26 PROCEDURE — 80048 BASIC METABOLIC PNL TOTAL CA: CPT | Performed by: INTERNAL MEDICINE

## 2017-10-26 PROCEDURE — 85018 HEMOGLOBIN: CPT | Performed by: INTERNAL MEDICINE

## 2017-10-26 RX ORDER — DROSPIRENONE AND ETHINYL ESTRADIOL 0.02-3(28)
1 KIT ORAL DAILY
Qty: 84 TABLET | Refills: 3 | Status: SHIPPED | OUTPATIENT
Start: 2017-10-26 | End: 2017-11-17

## 2017-10-26 NOTE — LETTER
[facility]  [address] [id number] [phone]      10/26/2017       Theresa Quarles  [address] [place] [id number]        Dear Theresa,    I am pleased to inform you that your routine blood work including your hemoglobin, sodium, potassium, calcium, kidney and thyroid function tests are all normal.    Your Hemoglobin A1C and blood sugar tests look good and I would continue with your medication without change.  These tests should be repeated in 6 months.    Sincerely,      Arsenio Smith MD  Internal Medicine

## 2017-10-26 NOTE — PROGRESS NOTES

## 2017-10-26 NOTE — PROGRESS NOTES
SUBJECTIVE:   CC: Theresa Quarles is an 47 year old woman who presents for preventive health visit.     Answers for HPI/ROS submitted by the patient on 10/26/2017   Annual Exam:  Getting at least 3 servings of Calcium per day:: Yes  Bi-annual eye exam:: Yes  Dental care twice a year:: Yes  Sleep apnea or symptoms of sleep apnea:: Daytime drowsiness  Frequency of exercise:: 4-5 days/week  Diet:: Low fat/cholesterol, Diabetic, Vegetarian/vegan  Taking medications regularly:: Yes  Medication side effects:: None  Additional concerns today:: YES  PHQ-2 Score: 0  Duration of exercise:: 15-30 minutes    Other concerns:  1. Patient had an episode, 10?3,4/2017, 3 weeks ago where she passed out and lost consciousness for 2+ hours ? No symptoms since, woke up on couch although was not by couch several hours prior. No symptoms since.  No headaches, neck pain or otherwise.  No obvious triggers, no prior history.  Did not see physician.  2. Seen at  5/25/17 for angioedema- patient d/c lisinopril with concerns it was causing swelling     Today's PHQ-2 Score:   PHQ-2 ( 1999 Pf[employer]) 4/10/2017 12/12/2016   Q1: Little interest or pleasure in doing things 0 0   Q2: Feeling down, depressed or hopeless 0 0   PHQ-2 Score 0 0         Abuse: Current or Past(Physical, Sexual or Emotional)- No  Do you feel safe in your environment - Yes  Social History   Substance Use Topics     Smoking status: Never Smoker     Smokeless tobacco: Never Used     Alcohol use No     The patient does not drink >3 drinks per day nor >7 drinks per week.    Reviewed orders with patient.  Reviewed health maintenance and updated orders accordingly - Yes      Patient over age 50, mutual decision to screen reflected in health maintenance.      Pertinent mammograms are reviewed under the imaging tab.  History of abnormal Pap smear: NO - age 30-65 PAP every 5 years with negative HPV co-testing recommended    Reviewed and updated as needed this visit by clinical  "staffTobacco  Allergies  Med Hx  Surg Hx  Fam Hx  Soc Hx        Reviewed and updated as needed this visit by Provider          ROS:  C: NEGATIVE for fever, chills, change in weight  I: NEGATIVE for worrisome rashes, moles or lesions  E: NEGATIVE for vision changes or irritation  ENT: NEGATIVE for ear, mouth and throat problems  R: NEGATIVE for significant cough or SOB  B: NEGATIVE for masses, tenderness or discharge  CV: NEGATIVE for chest pain, No palpitations or peripheral edema  GI: NEGATIVE for nausea, abdominal pain, heartburn, or change in bowel habits  : NEGATIVE for unusual urinary or vaginal symptoms. No vaginal bleeding.  M: NEGATIVE for significant arthralgias or myalgia  N: NEGATIVE for weakness, dizziness or paresthesias  P: NEGATIVE for changes in mood or affect     OBJECTIVE:   /78  Pulse 92  Temp 98  F (36.7  C)  Ht 5' 2\" (1.575 m)  Wt 131 lb (59.4 kg)  LMP 10/14/2017 (Exact Date)  SpO2 98%  Breastfeeding? No  BMI 23.96 kg/m2  EXAM:  GENERAL: healthy, alert and no distress  EYES: Eyes grossly normal to inspection, PERRL and conjunctivae and sclerae normal  HENT: ear canals and TM's normal, nose and mouth without ulcers or lesions  NECK: no adenopathy, no asymmetry, masses, or scars and thyroid normal to palpation  RESP: lungs clear to auscultation - no rales, rhonchi or wheezes  BREAST: normal without masses, tenderness or nipple discharge and no palpable axillary masses or adenopathy  CV: regular rate and rhythm, normal S1 S2, no S3 or S4, no murmur, click or rub, no peripheral edema and peripheral pulses strong  ABDOMEN: soft, nontender, no hepatosplenomegaly, no masses and bowel sounds normal  MS: no gross musculoskeletal defects noted, no edema  NEURO: Normal strength and tone, mentation intact and speech normal  PSYCH: mentation appears normal, affect normal/bright    Component      Latest Ref Rng & Units 4/10/2017   Sodium      133 - 144 mmol/L 137   Potassium      3.4 - " 5.3 mmol/L 3.8   Chloride      94 - 109 mmol/L 102   Carbon Dioxide      20 - 32 mmol/L 26   Anion Gap      3 - 14 mmol/L 9   Glucose      70 - 99 mg/dL 112 (H)   Urea Nitrogen      7 - 30 mg/dL 8   Creatinine      0.52 - 1.04 mg/dL 0.52   GFR Estimate      >60 mL/min/1.7m2 >90 . . .   GFR Estimate If Black      >60 mL/min/1.7m2 >90 . . .   Calcium      8.5 - 10.1 mg/dL 8.8   Bilirubin Total      0.2 - 1.3 mg/dL 0.5   Albumin      3.4 - 5.0 g/dL 3.8   Protein Total      6.8 - 8.8 g/dL 7.8   Alkaline Phosphatase      40 - 150 U/L 64   ALT      0 - 50 U/L 41   AST      0 - 45 U/L 36   Cholesterol      <200 mg/dL 208 (H)   Triglycerides      <150 mg/dL 387 (H)   HDL Cholesterol      >49 mg/dL 47 (L)   LDL Cholesterol Calculated      <100 mg/dL 84   Non HDL Cholesterol      <130 mg/dL 161 (H)   Hemoglobin A1C      4.3 - 6.0 % 6.6 (H)     ASSESSMENT/PLAN:   1. Encounter for routine adult health examination without abnormal findings  As ordered  - Hemoglobin    2. Type 2 diabetes mellitus without complication, without long-term current use of insulin (H)  Labs as fasting, appears stable, no distinct evidence of hypoglycemia  - Hemoglobin A1c  - TSH with free T4 reflex  - Albumin Random Urine Quantitative with Creat Ratio  - ACE/ARB/ARNI NOT PRESCRIBED, INTENTIONAL,; Please choose reason not prescribed, below as angioedema of lips    3. Encounter for surveillance of contraceptive pills  Refilled per request  - drospirenone-ethinyl estradiol (DAE) 3-0.02 MG per tablet; Take 1 tablet by mouth daily  Dispense: 84 tablet; Refill: 3    4. Hyperlipidemia with target LDL less than 130  LDL Cholesterol Calculated   Date Value Ref Range Status   04/10/2017 84 <100 mg/dL Final     Comment:     Desirable:       <100 mg/dl   ]at goal     5. Syncope, unspecified syncope type  Etiology unclear but presentation unusually but certainly concerning.  I have advised no driving, suggest CV assessment for source for episodes as well as EEG for  "atypical seizure.  She does not have any bruises now but did show me a picture on her phone of a shoulder bruise after fall.  Quite unusual, if accurate, to be \"out\" for 2 hours, ??ictal state.  No other focal changes on exam, no prior history, no history since.  Advised that if recurs need to be seen immediatly.  Unusual for hypoglycemia but she does not report issues of such prior.  - OFFICE/OUTPT VISIT,EST,LEVL IV  - Echocardiogram Complete; Future  - Zio Patch 48 Hours; Future  - EEG SLEEP DEPRIVED; Future  - Basic metabolic panel  - US Carotid Bilateral; Future    COUNSELING:   Reviewed preventive health counseling, as reflected in patient instructions       Regular exercise       Healthy diet/nutrition     reports that she has never smoked. She has never used smokeless tobacco.    Estimated body mass index is 23.41 kg/(m^2) as calculated from the following:    Height as of 4/10/17: 5' 2\" (1.575 m).    Weight as of 5/25/17: 128 lb (58.1 kg).         Counseling Resources:  ATP IV Guidelines  Pooled Cohorts Equation Calculator  Breast Cancer Risk Calculator  FRAX Risk Assessment  ICSI Preventive Guidelines  Dietary Guidelines for Americans, 2010  [employer]'s MyPlate  ASA Prophylaxis  Lung CA Screening    Arsenio Smith MD  [facility]    THE MEDICATION LIST HAS BEEN FULLY RECONCILED BY THE M.D. AND THE NURSING STAFF.    "

## 2017-10-26 NOTE — MR AVS SNAPSHOT
After Visit Summary   10/26/2017    Theresa Quarles    MRN: [id number]           Patient Information     Date Of Birth          1970        Visit Information        Provider Department      10/26/2017 10:00 AM Arsenio Smith MD [facility]        Today's Diagnoses     Encounter for routine adult health examination without abnormal findings    -  1    Type 2 diabetes mellitus without complication, without long-term current use of insulin (H)        Encounter for surveillance of contraceptive pills        Hyperlipidemia with target LDL less than 130        Syncope, unspecified syncope type          Care Instructions      Preventive Health Recommendations  Female Ages 40 to 49    Yearly exam:     See your health care provider every year in order to  1. Review health changes.   2. Discuss preventive care.    3. Review your medicines if your doctor prescribed any.      Get a Pap test every three years (unless you have an abnormal result and your provider advises testing more often).      If you get Pap tests with HPV test, you only need to test every 5 years, unless you have an abnormal result. You do not need a Pap test if your uterus was removed (hysterectomy) and you have not had cancer.      You should be tested each year for STDs (sexually transmitted diseases), if you're at risk.       Ask your doctor if you should have a mammogram.      Have a colonoscopy (test for colon cancer) if someone in your family has had colon cancer or polyps before age 50.       Have a cholesterol test every 5 years.       Have a diabetes test (fasting glucose) after age 45. If you are at risk for diabetes, you should have this test every 3 years.    Shots: Get a flu shot each year. Get a tetanus shot every 10 years.     Nutrition:     Eat at least 5 servings of fruits and vegetables each day.    Eat whole-grain bread, whole-wheat pasta and brown rice instead of white grains and rice.    Talk to  "your provider about Calcium and Vitamin D.     Lifestyle    Exercise at least 150 minutes a week (an average of 30 minutes a day, 5 days a week). This will help you control your weight and prevent disease.    Limit alcohol to one drink per day.    No smoking.     Wear sunscreen to prevent skin cancer.    See your dentist every six months for an exam and cleaning.          Follow-ups after your visit        Your next 10 appointments already scheduled     Oct 30, 2017  9:30 AM CDT   MA SCREENING DIGITAL BILATERAL with OXMA1   [facility]    [address] [address] [phone]           Do not use any powder, lotion or deodorant under your arms or on your breast. If you do, we will ask you to remove it before your exam.  Wear comfortable, two-piece clothing.  If you have any allergies, tell your care team.  Bring any previous mammograms from other facilities or have them mailed to the breast center. Three-dimensional (3D) mammograms are available at [facility] locations in [facility], [facility], [facility], and [place]. [facility] locations include [facility] in [place]. Benefits of 3D mammograms include: - Improved rate of cancer detection - Decreases your chance of having to go back for more tests, which means fewer: - \"False-positive\" results (This means that there is an abnormal area but it isn't cancer.) - Invasive testing procedures, such as a biopsy or surgery - Can provide clearer images of the breast if you have dense breast tissue. 3D mammography is an optional exam that anyone can have with a 2D mammogram. It doesn't replace or take the place of a 2D mammogram. 2D mammograms remain an effective screening test for all women.  Not all insurance companies cover the cost of a 3D mammogram. Check with your insurance.            Nov 17, 2017 10:15 AM CST "   Office Visit with Kraig Arriaza MD   [facility] ([facility])    [address] [address] [phone]           Bring a current list of meds and any records pertaining to this visit. For Physicals, please bring immunization records and any forms needing to be filled out. Please arrive 10 minutes early to complete paperwork.              Future tests that were ordered for you today     Open Future Orders        Priority Expected Expires Ordered    Echocardiogram Complete Routine  10/26/2018 10/26/2017    Zio Patch 48 Hours Routine  12/10/2017 10/26/2017    EEG SLEEP DEPRIVED Routine  11/23/2017 10/26/2017            Who to contact     If you have questions or need follow up information about today's clinic visit or your schedule please contact [facility] directly at [phone].  Normal or non-critical lab and imaging results will be communicated to you by [employer], letter or phone within 4 business days after the clinic has received the results. If you do not hear from us within 7 days, please contact the clinic through [employer] or phone. If you have a critical or abnormal lab result, we will notify you by phone as soon as possible.  Submit refill requests through [employer] or call your pharmacy and they will forward the refill request to us. Please allow 3 business days for your refill to be completed.          Additional Information About Your Visit        MyChart Information     [employer] gives you secure access to your electronic health record. If you see a primary care provider, you can also send messages to your care team and make appointments. If you have questions, please call your primary care clinic.  If you do not have a primary care provider, please call [phone] and they will assist you.        Care EveryWhere ID     This is your Care EveryWhere ID. This could be used by other organizations to access  "your [facility] medical records  [id number]        Your Vitals Were     Pulse Temperature Height Last Period Pulse Oximetry Breastfeeding?    92 98  F (36.7  C) 5' 2\" (1.575 m) 10/14/2017 (Exact Date) 98% No    BMI (Body Mass Index)                   23.96 kg/m2            Blood Pressure from Last 3 Encounters:   10/26/17 116/78   05/25/17 108/70   04/10/17 126/78    Weight from Last 3 Encounters:   10/26/17 131 lb (59.4 kg)   05/25/17 128 lb (58.1 kg)   04/10/17 129 lb 12.8 oz (58.9 kg)              We Performed the Following     Albumin Random Urine Quantitative with Creat Ratio     Basic metabolic panel     Hemoglobin A1c     Hemoglobin     OFFICE/OUTPT VISIT,EST,LEVL IV     TSH with free T4 reflex          Where to get your medicines      These medications were sent to [facility] - [place], [place] - [address] AT [address] [place] [id number]  [facility] [address]     Phone:  [phone]     drospirenone-ethinyl estradiol 3-0.02 MG per tablet          Primary Care Provider Office Phone # Fax #    Arsenio Smith [phone]       [address] [place] [phone]        Equal Access to Services     GUERA DE : Hadii aad ku hadasho Soomaali, waaxda luqadaha, qaybta kaalmada adeegemanuel, aaron osuna. So [facility] [place] [phone].    ATENCIÓN: Si habla español, tiene a álvarez disposición servicios gratuitos de asistencia lingüística. Eduardo al [phone].    We comply with applicable federal civil rights laws and [place] laws. We do not discriminate on the basis of race, color, national origin, age, disability, sex, sexual orientation, or gender identity.            Thank you!     Thank you for choosing [facility]  for your care. Our goal is always to provide you with excellent care. Hearing back from our patients is one way we can continue to improve our services. Please take a few minutes to " complete the written survey that you may receive in the mail after your visit with us. Thank you!             Your Updated Medication List - Protect others around you: Learn how to safely use, store and throw away your medicines at www.disposemymeds.org.          This list is accurate as of: 10/26/17 10:30 AM.  Always use your most recent med list.                   Brand Name Dispense Instructions for use Diagnosis    blood glucose monitoring meter device kit     1 kit    Use to test blood sugars 3 times daily or as directed with appropriate meter test strips and lancets and ETOH swabs, # 1 month, refill times 11    Type 2 diabetes mellitus without complication, without long-term current use of insulin (H)       CINNAMON PO      Take  by mouth.        drospirenone-ethinyl estradiol 3-0.02 MG per tablet    DAE    84 tablet    Take 1 tablet by mouth daily    Encounter for surveillance of contraceptive pills       glimepiride 1 MG tablet    AMARYL    90 tablet    Take 1 tablet (1 mg) by mouth every morning (before breakfast)    Type 2 diabetes mellitus without complication, without long-term current use of insulin (H)       lisinopril 10 MG tablet    PRINIVIL/ZESTRIL    90 tablet    Take 1 tablet (10 mg) by mouth daily    Type 2 diabetes mellitus without complication, without long-term current use of insulin (H)       metFORMIN 500 MG tablet    GLUCOPHAGE    360 tablet    Take 2 tablets (1,000 mg) by mouth 2 times daily (with meals)    Type 2 diabetes mellitus without complication, without long-term current use of insulin (H)       multivitamin per tablet     100 Tab    ONE DAILY    Paresthesia       STATIN NOT PRESCRIBED (INTENTIONAL)     0 each    Statin not prescribed intentionally due to Active liver disease (fatty liver)    Fatty liver, Type 2 diabetes mellitus without complication, without long-term current use of insulin (H)

## 2017-10-26 NOTE — NURSING NOTE
"Chief Complaint   Patient presents with     Physical       Initial /78  Pulse 92  Temp 98  F (36.7  C)  Ht 5' 2\" (1.575 m)  Wt 131 lb (59.4 kg)  LMP 10/14/2017 (Exact Date)  SpO2 98%  Breastfeeding? No  BMI 23.96 kg/m2 Estimated body mass index is 23.96 kg/(m^2) as calculated from the following:    Height as of this encounter: 5' 2\" (1.575 m).    Weight as of this encounter: 131 lb (59.4 kg).  Medication Reconciliation: complete   Ewa Gresham CMA      "

## 2017-10-30 ENCOUNTER — RADIANT APPOINTMENT (OUTPATIENT)
Dept: MAMMOGRAPHY | Facility: CLINIC | Age: 47
End: 2017-10-30
Attending: INTERNAL MEDICINE
Payer: COMMERCIAL

## 2017-10-30 DIAGNOSIS — Z12.31 VISIT FOR SCREENING MAMMOGRAM: ICD-10-CM

## 2017-10-30 PROCEDURE — G0202 SCR MAMMO BI INCL CAD: HCPCS | Mod: TC

## 2017-11-01 ENCOUNTER — RADIANT APPOINTMENT (OUTPATIENT)
Dept: CARDIOLOGY | Facility: CLINIC | Age: 47
End: 2017-11-01
Attending: INTERNAL MEDICINE
Payer: COMMERCIAL

## 2017-11-01 ENCOUNTER — TELEPHONE (OUTPATIENT)
Dept: INTERNAL MEDICINE | Facility: CLINIC | Age: 47
End: 2017-11-01

## 2017-11-01 DIAGNOSIS — R55 SYNCOPE, UNSPECIFIED SYNCOPE TYPE: ICD-10-CM

## 2017-11-01 PROCEDURE — 93306 TTE W/DOPPLER COMPLETE: CPT | Mod: 26 | Performed by: INTERNAL MEDICINE

## 2017-11-01 PROCEDURE — 93306 TTE W/DOPPLER COMPLETE: CPT | Mod: TC

## 2017-11-01 NOTE — TELEPHONE ENCOUNTER
Please inform patient of noted results:    Left ventricular systolic function is normal.  No regional wall motion abnormalities noted.  Mild aortic insufficiency.

## 2017-11-06 ENCOUNTER — HOSPITAL ENCOUNTER (OUTPATIENT)
Dept: CARDIOLOGY | Facility: CLINIC | Age: 47
Discharge: HOME | End: 2017-11-06
Attending: INTERNAL MEDICINE | Admitting: INTERNAL MEDICINE
Payer: COMMERCIAL

## 2017-11-06 DIAGNOSIS — R55 SYNCOPE, UNSPECIFIED SYNCOPE TYPE: ICD-10-CM

## 2017-11-06 PROCEDURE — 0298T ZZC EXT ECG > 48HR TO 21 DAY REVIEW AND INTERPRETATN: CPT | Performed by: INTERNAL MEDICINE

## 2017-11-06 PROCEDURE — 93225 XTRNL ECG REC<48 HRS REC: CPT

## 2017-11-16 ENCOUNTER — TELEPHONE (OUTPATIENT)
Dept: INTERNAL MEDICINE | Facility: CLINIC | Age: 47
End: 2017-11-16

## 2017-11-16 NOTE — LETTER
[facility]  [address] [id number] [phone]  Dept: [phone]        November 16, 2017        Theresa Quarles  [address]  [facility] [address]          Dear Theresa,      IMAGING RESULTS:     The results of your recent  Echocardiogram were NORMAL.  If you have any further questions or problems, please contact our office.          Sincerely,      Ewa Gresham

## 2017-11-17 ENCOUNTER — OFFICE VISIT (OUTPATIENT)
Dept: OBGYN | Facility: CLINIC | Age: 47
End: 2017-11-17
Payer: COMMERCIAL

## 2017-11-17 VITALS — WEIGHT: 130 LBS | SYSTOLIC BLOOD PRESSURE: 120 MMHG | BODY MASS INDEX: 23.78 KG/M2 | DIASTOLIC BLOOD PRESSURE: 76 MMHG

## 2017-11-17 DIAGNOSIS — E11.9 TYPE 2 DIABETES MELLITUS WITHOUT COMPLICATION, WITHOUT LONG-TERM CURRENT USE OF INSULIN (H): ICD-10-CM

## 2017-11-17 DIAGNOSIS — Z01.411 ENCOUNTER FOR GYNECOLOGICAL EXAMINATION WITH ABNORMAL FINDING: Primary | ICD-10-CM

## 2017-11-17 DIAGNOSIS — E78.2 ELEVATED TRIGLYCERIDES WITH HIGH CHOLESTEROL: ICD-10-CM

## 2017-11-17 DIAGNOSIS — Z30.41 ENCOUNTER FOR SURVEILLANCE OF CONTRACEPTIVE PILLS: ICD-10-CM

## 2017-11-17 PROCEDURE — 99396 PREV VISIT EST AGE 40-64: CPT | Performed by: OBSTETRICS & GYNECOLOGY

## 2017-11-17 RX ORDER — DROSPIRENONE AND ETHINYL ESTRADIOL 0.02-3(28)
1 KIT ORAL DAILY
Qty: 84 TABLET | Refills: 3 | Status: SHIPPED | OUTPATIENT
Start: 2017-11-17 | End: 2018-08-09

## 2017-11-17 NOTE — NURSING NOTE
"Chief Complaint   Patient presents with     Gyn Exam     breast and pelvic       Initial /76  Wt 130 lb (59 kg)  LMP 10/14/2017 (Exact Date)  BMI 23.78 kg/m2 Estimated body mass index is 23.78 kg/(m^2) as calculated from the following:    Height as of 10/26/17: 5' 2\" (1.575 m).    Weight as of this encounter: 130 lb (59 kg).  BP completed using cuff size: regular        The following HM Due: NONE      The following patient reported/Care Every where data was sent to:  P ABSTRACT QUALITY INITIATIVES [[id number]]       Charlee Cabrera, [facility]                "

## 2017-11-17 NOTE — MR AVS SNAPSHOT
After Visit Summary   11/17/2017    Theresa Quarles    MRN: [id number]           Patient Information     Date Of Birth          1970        Visit Information        Provider Department      11/17/2017 10:15 AM Kraig Arriaza MD [facility]        Today's Diagnoses     Encounter for gynecological examination with abnormal finding    -  1    Encounter for surveillance of contraceptive pills        Type 2 diabetes mellitus without complication, without long-term current use of insulin (H)        Elevated triglycerides with high cholesterol          Care Instructions    You can reach your [place] Care Team any time of the day by calling [phone]. This number will put you in touch with the 24 hour nurse line if the clinic is closed.    To contact your OB/GYN Surgery Scheduler please call [phone]. This is a direct number for your care team between 8 a.m. and 4 p.m. Monday through Friday.    [facility] is open for your convenience: [phone]  Monday through Friday 8 a.m. to 8:30 p.m.  Saturday 9 a.m. to 6 p.m.  Sunday Noon to 6 p.m.    They are closed on all major holidays.              Follow-ups after your visit        Follow-up notes from your care team     Return in about 1 year (around 11/17/2018).      Your next 10 appointments already scheduled     Nov 20, 2017  3:50 PM CST   US CAROTID BILATERAL with SHUS1   [facility] ([facility])    [address] [place] [id number] [phone]           Please bring a list of your medicines (including vitamins, minerals and over-the-counter drugs). Also, tell your doctor about any allergies you may have. Wear comfortable clothes and leave your valuables at home.  You do not need to do anything special to prepare for your exam.  Please call the Imaging Department at your exam site with any questions.              Who to contact     If you have questions or need follow up  information about today's clinic visit or your schedule please contact [facility] directly at [phone].  Normal or non-critical lab and imaging results will be communicated to you by MyChart, letter or phone within 4 business days after the clinic has received the results. If you do not hear from us within 7 days, please contact the clinic through My[facility]Place[facility] or phone. If you have a critical or abnormal lab result, we will notify you by phone as soon as possible.  Submit refill requests through [employer] or call your pharmacy and they will forward the refill request to us. Please allow 3 business days for your refill to be completed.          Additional Information About Your Visit        My[facility]Place[facility] Information     [employer] gives you secure access to your electronic health record. If you see a primary care provider, you can also send messages to your care team and make appointments. If you have questions, please call your primary care clinic.  If you do not have a primary care provider, please call [phone] and they will assist you.        Care EveryWhere ID     This is your Care EveryWhere ID. This could be used by other organizations to access your [place] medical records  [id number]        Your Vitals Were     Last Period BMI (Body Mass Index)                10/14/2017 (Exact Date) 23.78 kg/m2           Blood Pressure from Last 3 Encounters:   11/17/17 120/76   10/26/17 116/78   05/25/17 108/70    Weight from Last 3 Encounters:   11/17/17 130 lb (59 kg)   10/26/17 131 lb (59.4 kg)   05/25/17 128 lb (58.1 kg)              Today, you had the following     No orders found for display         Where to get your medicines      These medications were sent to [facility] - [place], [place] - [address] AT Portal to Registered [facility] Sites  [address], [place] [address]     Phone:  [phone]     drospirenone-ethinyl estradiol 3-0.02 MG per tablet           Primary Care Provider Office Phone # Fax #    Arsenio Smith [phone]       [address] [facility] [address]        Equal Access to Services     GUERA DE : Hadii aad ku kaityo Soomaali, waaxda luqadaha, qaybta kaalmada adefishyada, aaron jamesmarsha thao. So [facility] [phone].    ATENCIÓN: Si habla español, tiene a álvarez disposición servicios gratuitos de asistencia lingüística. Llame al [phone].    We comply with applicable federal civil rights laws and [place] laws. We do not discriminate on the basis of race, color, national origin, age, disability, sex, sexual orientation, or gender identity.            Thank you!     Thank you for choosing [facility]  for your care. Our goal is always to provide you with excellent care. Hearing back from our patients is one way we can continue to improve our services. Please take a few minutes to complete the written survey that you may receive in the mail after your visit with us. Thank you!             Your Updated Medication List - Protect others around you: Learn how to safely use, store and throw away your medicines at www.disposemymeds.org.          This list is accurate as of: 11/17/17 11:16 AM.  Always use your most recent med list.                   Brand Name Dispense Instructions for use Diagnosis    ACE/ARB/ARNI NOT PRESCRIBED (INTENTIONAL)      Please choose reason not prescribed, below    Type 2 diabetes mellitus without complication, without long-term current use of insulin (H)       blood glucose monitoring meter device kit     1 kit    Use to test blood sugars 3 times daily or as directed with appropriate meter test strips and lancets and ETOH swabs, # 1 month, refill times 11    Type 2 diabetes mellitus without complication, without long-term current use of insulin (H)       CINNAMON PO      Take  by mouth.        drospirenone-ethinyl estradiol 3-0.02 MG per tablet    DAE    84  tablet    Take 1 tablet by mouth daily    Encounter for surveillance of contraceptive pills       glimepiride 1 MG tablet    AMARYL    90 tablet    Take 1 tablet (1 mg) by mouth every morning (before breakfast)    Type 2 diabetes mellitus without complication, without long-term current use of insulin (H)       metFORMIN 500 MG tablet    GLUCOPHAGE    360 tablet    Take 2 tablets (1,000 mg) by mouth 2 times daily (with meals)    Type 2 diabetes mellitus without complication, without long-term current use of insulin (H)       multivitamin per tablet     100 Tab    ONE DAILY    Paresthesia       STATIN NOT PRESCRIBED (INTENTIONAL)     0 each    Statin not prescribed intentionally due to Active liver disease (fatty liver)    Fatty liver, Type 2 diabetes mellitus without complication, without long-term current use of insulin (H)

## 2017-11-17 NOTE — PROGRESS NOTES
HPI:  Theresa Quarles is a 47 year old s.e.   female , oral contraceptives for contraception who presents for an annual exam and pap.  She is Doing well. The patient follows with Dr. Smith for her type 2 diabetes managed with oral hypoglycemics. The patient had an adverse reaction to Zocor in the past With swelling in her lips and presently is following with Dr. Smith regarding her elevated lipids.  The patient desires to remain on her birth control pill. She has no other absolute contraindications   Instrucitons and indications for backup were thoroughly rev.  All her ?'s were answered.  Patient had a normal cardiac echo in zio patch Holter monitor To evaluate syncope  Patient states that she had an eye exam just recently denies any foot ulceration. I reviewed her recent hemoglobin A1c and lab tests  Self breast exam,  ACS screening mammogram recs, the use of 81 mg ASA to decrease the risk of heart disease, lipid screening, colon cancer screening recs and Dexa scan recs thoroughly reveiwed.      Past Medical History:   Diagnosis Date     Fatty liver 2012     GBS (group B streptococcus) infection 2008     gestational diabetes      Gestational diabetes mellitus, antepartum 3/9/2009     Hyperlipidemia LDL goal < 130 2010     Liver function test abnormality 2010     Pregnancies, high-risk 3/19/2009     Past Surgical History:   Procedure Laterality Date     BIOPSY BREAST  2012    needle bx benign     HC EXCISE HAND/FOOT NEUROMA       HC REMOVAL OF TONSILS,14+ Y/O      Tonsils 14+y.o.     Family History   Problem Relation Age of Onset     CEREBROVASCULAR DISEASE Father           Thyroid Disease Mother      s/p thyrodectomy     Cancer - colorectal Mother      rectal cancer     Social History     Social History     Marital status:      Spouse name: N/A     Number of children: N/A     Years of education: N/A     Occupational History     Not on file.     Social History Main  Topics     Smoking status: Never Smoker     Smokeless tobacco: Never Used     Alcohol use No     Drug use: No     Sexual activity: Yes     Partners: Male     Birth control/ protection: Pill      Comment: Pt desires BCP     Other Topics Concern     Not on file     Social History Narrative       Allergies:  Lisinopril    Current Outpatient Prescriptions   Medication Sig Dispense Refill     drospirenone-ethinyl estradiol (DAE) 3-0.02 MG per tablet Take 1 tablet by mouth daily 84 tablet 3     ACE/ARB/ARNI NOT PRESCRIBED, INTENTIONAL, Please choose reason not prescribed, below       glimepiride (AMARYL) 1 MG tablet Take 1 tablet (1 mg) by mouth every morning (before breakfast) 90 tablet 3     metFORMIN (GLUCOPHAGE) 500 MG tablet Take 2 tablets (1,000 mg) by mouth 2 times daily (with meals) 360 tablet 3     STATIN NOT PRESCRIBED, INTENTIONAL, Statin not prescribed intentionally due to Active liver disease (fatty liver) 0 each 0     blood glucose monitoring (ACCU-CHEK BECKY PLUS) meter device kit Use to test blood sugars 3 times daily or as directed with appropriate meter test strips and lancets and ETOH swabs, # 1 month, refill times 11 1 kit 5     CINNAMON PO Take  by mouth.       MULTIVITAMINS PO TABS ONE DAILY 100 Tab 3     [DISCONTINUED] drospirenone-ethinyl estradiol (DAE) 3-0.02 MG per tablet Take 1 tablet by mouth daily 84 tablet 3       ROS: ROS: 10 point ROS neg other than the symptoms noted above in the HPI.    EXAM:  Vitals: /76  Wt 130 lb (59 kg)  LMP 10/14/2017 (Exact Date)  BMI 23.78 kg/m2  BMI= Body mass index is 23.78 kg/(m^2).  Constitutional: healthy, alert and no distress  Head: Normocephalic. No masses, lesions, tenderness or abnormalities  Neck: Neck supple. No adenopathy. Thyroid symmetric, normal size,, Carotids without bruits.  ENT: NEGATIVE for ear, mouth and throat problems  Breast:  breasts symmetric, no dominant or suspicious mass, no skin or nipple changes, no axillary adenopathy,  unchanged from previous exam or self exam in taught and encouraged  Cardiovascular: negative, PMI normal. No lifts, heaves, or thrills. RRR. No murmurs, clicks gallops or rub  Respiratory: negative, Percussion normal. Good diaphragmatic excursion. Lungs clear  Gastrointestinal: Abdomen soft, non-tender. BS normal. No masses, organomegaly  Genitourinary: Pelvic Exam:  External Genitalia:     Normal appearance for age, no discharge present, no tenderness present, no inflammatory lesions present, color normal  Vagina:     Normal vaginal vault without central or paravaginal defects, no discharge present, no inflammatory lesions present, no masses present  Bladder:     Nontender to palpation  Urethra:   Urethral Body:  Urethra palpation normal, urethra structural support normal   Urethral Meatus:  No erythema or lesions present  Cervix:     Appearance healthy, no lesions present, nontender to palpation, no bleeding present  Uterus:     Uterus: firm, normal sized and nontender, midplane in position.   Adnexa:     No adnexal tenderness present, no adnexal masses present  Perineum:     Perineum within normal limits, no evidence of trauma, no rashes or skin lesions present  Anus:     Anus within normal limits, no hemorrhoids present  Inguinal Lymph Nodes:     No lymphadenopathy present  Pubic Hair:     Normal pubic hair distribution for age  Genitalia and Groin:     No rashes present, no lesions present, no areas of discoloration, no masses present    Musculoskeletalextremities normal- no gross deformities noted, gait normal and normal muscle tone  Integument: no suspicious lesions or rashes  No foot ulceration  Neurologic: Gait normal. Reflexes normal and symmetric. Sensation grossly WNL.  Psychiatric: mentation appears normal and affect normal/bright  Hematologic/Lymphatic/Immunologic: Normal cervical lymph nodes     ASSESSMENT:/PLAN:  (Z01.411) Encounter for gynecological examination with abnormal finding  (primary  encounter diagnosis)  Comment: Normal GYN exam today in a patient with type 2 diabetes  Plan: Refill her OCPs given    (Z30.41) Encounter for surveillance of contraceptive pills  Comment: as above  Plan: drospirenone-ethinyl estradiol (DAE) 3-0.02 MG         per tablet        Done    (E11.9) Type 2 diabetes mellitus without complication, without long-term current use of insulin (H)  Comment: Follows with Dr. Smith  Surveillance monitoring up to date. Elevated triglycerides and lipid panel reviewed and is currently following with Dr. Smith for this  Plan: As per plan of Dr. Smith    (E78.2) Elevated triglycerides with high cholesterol  Comment: As above  Plan: otherwise return one year or when necessary concerns for routine GYN follow-up       Kraig Arriaza M.D.    (Chart documentation was completed, in part, with Spotigo voice-recognition software. Even though reviewed, some grammatical, spelling, and word errors may remain.)

## 2017-11-17 NOTE — PATIENT INSTRUCTIONS
You can reach your [place] Care Team any time of the day by calling [phone]. This number will put you in touch with the 24 hour nurse line if the clinic is closed.    To contact your OB/GYN Surgery Scheduler please call [phone]. This is a direct number for your care team between 8 a.m. and 4 p.m. Monday through Friday.    [facility] is open for your convenience: [phone]  Monday through Friday 8 a.m. to 8:30 p.m.  Saturday 9 a.m. to 6 p.m.  Sunday Noon to 6 p.m.    They are closed on all major holidays.

## 2017-11-20 ENCOUNTER — HOSPITAL ENCOUNTER (OUTPATIENT)
Dept: ULTRASOUND IMAGING | Facility: CLINIC | Age: 47
Discharge: HOME | End: 2017-11-20
Attending: INTERNAL MEDICINE | Admitting: INTERNAL MEDICINE
Payer: COMMERCIAL

## 2017-11-20 DIAGNOSIS — R55 SYNCOPE, UNSPECIFIED SYNCOPE TYPE: ICD-10-CM

## 2017-11-20 PROCEDURE — 93880 EXTRACRANIAL BILAT STUDY: CPT

## 2017-12-01 ENCOUNTER — TRANSFERRED RECORDS (OUTPATIENT)
Dept: HEALTH INFORMATION MANAGEMENT | Facility: CLINIC | Age: 47
End: 2017-12-01

## 2017-12-04 ENCOUNTER — TELEPHONE (OUTPATIENT)
Dept: INTERNAL MEDICINE | Facility: CLINIC | Age: 47
End: 2017-12-04

## 2017-12-04 NOTE — TELEPHONE ENCOUNTER
Reason for Call:  Other call back    Detailed comments: Pt states that someone was to call her to schedule an EEG.  No one has called her yet.    Phone Number Patient can be reached at: Home number on file [phone] (home)    Best Time: anytime before 1pm    Can we leave a detailed message on this number? YES    Call taken on 12/4/2017 at 9:08 AM by ANNAMARIE CAMEJO

## 2017-12-11 ENCOUNTER — TRANSFERRED RECORDS (OUTPATIENT)
Dept: HEALTH INFORMATION MANAGEMENT | Facility: CLINIC | Age: 47
End: 2017-12-11

## 2018-03-14 DIAGNOSIS — E11.9 TYPE 2 DIABETES MELLITUS WITHOUT COMPLICATION, WITHOUT LONG-TERM CURRENT USE OF INSULIN (H): ICD-10-CM

## 2018-03-14 RX ORDER — GLIMEPIRIDE 1 MG/1
TABLET ORAL
Qty: 90 TABLET | Refills: 0 | Status: SHIPPED | OUTPATIENT
Start: 2018-03-14 | End: 2018-06-23

## 2018-03-14 NOTE — TELEPHONE ENCOUNTER
"Requested Prescriptions   Pending Prescriptions Disp Refills     glimepiride (AMARYL) 1 MG tablet [Pharmacy Med Name: GLIMEPIRIDE  TAB 1MG]  Last Written Prescription Date:  4/10/2017  Last Fill Quantity: 90,  # refills: 3   Last office visit: 10/26/2017 with prescribing provider:  10/26/2017   Future Office Visit:     90 tablet 3     Sig: TAKE 1 TABLET EVERY MORNINGBEFORE BREAKFAST    Sulfonylurea Agents Failed    3/14/2018  7:03 AM       Failed - Patient has had a Microalbumin in the past 12 mos.    Recent Labs   Lab Test  11/01/16   0945   MICROL  91   UMALCR  371.84*            Failed - Patient has a recent creatinine (normal) within the past 12 mos.    Recent Labs   Lab Test  10/26/17   1105   CR  0.50*            Passed - Blood pressure less than 140/90 in past 6 months    BP Readings from Last 3 Encounters:   11/17/17 120/76   10/26/17 116/78   05/25/17 108/70                Passed - Patient has documented LDL within the past 12 mos.    Recent Labs   Lab Test  04/10/17   1000   LDL  84            Passed - Patient has documented A1c within the specified period of time.    Recent Labs   Lab Test  10/26/17   1105   A1C  6.9*            Passed - Patient is age 18 or older       Passed - No active pregnancy on record       Passed - Patient has not had a positive pregnancy test within the past 12 mos.       Passed - Recent (6 mo) or future (30 days) visit within the authorizing provider's specialty    Patient had office visit in the last 6 months or has a visit in the next 30 days with authorizing provider or within the authorizing provider's specialty.  See \"Patient Info\" tab in inbasket, or \"Choose Columns\" in Meds & Orders section of the refill encounter.              "

## 2018-03-24 ENCOUNTER — OFFICE VISIT (OUTPATIENT)
Dept: URGENT CARE | Facility: URGENT CARE | Age: 48
End: 2018-03-24
Payer: COMMERCIAL

## 2018-03-24 VITALS
DIASTOLIC BLOOD PRESSURE: 90 MMHG | OXYGEN SATURATION: 96 % | TEMPERATURE: 98.4 F | WEIGHT: 130.44 LBS | HEART RATE: 105 BPM | BODY MASS INDEX: 23.86 KG/M2 | SYSTOLIC BLOOD PRESSURE: 130 MMHG | RESPIRATION RATE: 20 BRPM

## 2018-03-24 DIAGNOSIS — J06.9 VIRAL UPPER RESPIRATORY TRACT INFECTION: Primary | ICD-10-CM

## 2018-03-24 PROCEDURE — 99213 OFFICE O/P EST LOW 20 MIN: CPT | Performed by: PHYSICIAN ASSISTANT

## 2018-03-24 RX ORDER — CODEINE PHOSPHATE AND GUAIFENESIN 10; 100 MG/5ML; MG/5ML
1 SOLUTION ORAL EVERY 4 HOURS PRN
Qty: 120 ML | Refills: 0 | Status: SHIPPED | OUTPATIENT
Start: 2018-03-24 | End: 2018-08-09

## 2018-03-24 RX ORDER — BENZONATATE 100 MG/1
100 CAPSULE ORAL 3 TIMES DAILY PRN
Qty: 30 CAPSULE | Refills: 0 | Status: SHIPPED | OUTPATIENT
Start: 2018-03-24 | End: 2018-08-09

## 2018-03-24 NOTE — PROGRESS NOTES
SUBJECTIVE:  Theresa Quarles is a 48 year old female who presents to the clinic today with a chief complaint of cough  for 1 day(s).  Her cough is described as persistent and nonproductive.    The patient's symptoms are moderate and worsening.  Associated symptoms include nasal congestion and sore throat. The patient's symptoms are exacerbated by no particular triggers  Patient has been using ngoc seltzer  to improve symptoms, it has not helped. She is a non smoker. Denies hx of asthma.     Past Medical History:   Diagnosis Date     Fatty liver 5/21/2012     GBS (group B streptococcus) infection 9/4/2008     gestational diabetes      Gestational diabetes mellitus, antepartum 3/9/2009     Hyperlipidemia LDL goal < 130 7/13/2010     Liver function test abnormality 7/13/2010     Pregnancies, high-risk 3/19/2009       Current Outpatient Prescriptions   Medication Sig Dispense Refill     benzonatate (TESSALON) 100 MG capsule Take 1 capsule (100 mg) by mouth 3 times daily as needed for cough 30 capsule 0     guaiFENesin-codeine (ROBITUSSIN AC) 100-10 MG/5ML SOLN solution Take 5 mLs by mouth every 4 hours as needed for cough 120 mL 0     glimepiride (AMARYL) 1 MG tablet TAKE 1 TABLET EVERY MORNINGBEFORE BREAKFAST 90 tablet 0     drospirenone-ethinyl estradiol (DAE) 3-0.02 MG per tablet Take 1 tablet by mouth daily 84 tablet 3     metFORMIN (GLUCOPHAGE) 500 MG tablet Take 2 tablets (1,000 mg) by mouth 2 times daily (with meals) 360 tablet 3     CINNAMON PO Take  by mouth.       MULTIVITAMINS PO TABS ONE DAILY 100 Tab 3     ACE/ARB/ARNI NOT PRESCRIBED, INTENTIONAL, Please choose reason not prescribed, below       STATIN NOT PRESCRIBED, INTENTIONAL, Statin not prescribed intentionally due to Active liver disease (fatty liver) 0 each 0     blood glucose monitoring (ACCU-CHEK BECKY PLUS) meter device kit Use to test blood sugars 3 times daily or as directed with appropriate meter test strips and lancets and ETOH swabs, # 1 month,  refill times 11 1 kit 5       Social History   Substance Use Topics     Smoking status: Never Smoker     Smokeless tobacco: Never Used     Alcohol use No       ROS  Review of systems negative except as stated above.    OBJECTIVE:  /90 (Cuff Size: Adult Regular)  Pulse 105  Temp 98.4  F (36.9  C) (Oral)  Resp 20  Wt 130 lb 7 oz (59.2 kg)  SpO2 96%  BMI 23.86 kg/m2  GENERAL APPEARANCE: healthy, alert and no distress  EYES: EOMI,  PERRL, conjunctiva clear  HENT: ear canals and TM's normal.  Nose and mouth without ulcers, erythema or lesions  NECK: supple, nontender, no lymphadenopathy  RESP: lungs clear to auscultation - no rales, rhonchi or wheezes  CV: regular rates and rhythm, normal S1 S2, no murmur noted  NEURO: Normal strength and tone, sensory exam grossly normal,  normal speech and mentation  SKIN: no suspicious lesions or rashes    ASSESSMENT/PLAN:  1. Viral upper respiratory tract infection  Discussed RED FLAG symptoms for cough  Black box warning for cough syrup given.   - benzonatate (TESSALON) 100 MG capsule; Take 1 capsule (100 mg) by mouth 3 times daily as needed for cough  Dispense: 30 capsule; Refill: 0  - guaiFENesin-codeine (ROBITUSSIN AC) 100-10 MG/5ML SOLN solution; Take 5 mLs by mouth every 4 hours as needed for cough  Dispense: 120 mL; Refill: 0    Symptomatic measures encouraged, humidified air, plenty of fluids.    Connie Simmons PA-C

## 2018-03-24 NOTE — NURSING NOTE
"Chief Complaint   Patient presents with     Cough     cough started yesterday       Initial /90 (Cuff Size: Adult Regular)  Pulse 105  Temp 98.4  F (36.9  C) (Oral)  Resp 20  Wt 130 lb 7 oz (59.2 kg)  SpO2 96%  BMI 23.86 kg/m2 Estimated body mass index is 23.86 kg/(m^2) as calculated from the following:    Height as of 10/26/17: 5' 2\" (1.575 m).    Weight as of this encounter: 130 lb 7 oz (59.2 kg).  Medication Reconciliation: complete Aron RINCON    "

## 2018-03-24 NOTE — MR AVS SNAPSHOT
After Visit Summary   3/24/2018    Theresa Quarles    MRN: [id number]           Patient Information     Date Of Birth          1970        Visit Information        Provider Department      3/24/2018 10:25 AM Connie Simmons PA-C [facility]        Today's Diagnoses     Viral upper respiratory tract infection    -  1      Care Instructions      Viral Upper Respiratory Illness (Adult)  You have a viral upper respiratory illness (URI), which is another term for the common cold. This illness is contagious during the first few days. It is spread through the air by coughing and sneezing. It may also be spread by direct contact (touching the sick person and then touching your own eyes, nose, or mouth). Frequent handwashing will decrease risk of spread. Most viral illnesses go away within 7 to 10 days with rest and simple home remedies. Sometimes the illness may last for several weeks. Antibiotics will not kill a virus, and they are generally not prescribed for this condition.    Home care    If symptoms are severe, rest at home for the first 2 to 3 days. When you resume activity, don't let yourself get too tired.    Avoid being exposed to cigarette smoke (yours or others ).    You may use acetaminophen or ibuprofen to control pain and fever, unless another medicine was prescribed. (Note: If you have chronic liver or kidney disease, have ever had a stomach ulcer or gastrointestinal bleeding, or are taking blood-thinning medicines, talk with your healthcare provider before using these medicines.) Aspirin should never be given to anyone under 18 years of age who is ill with a viral infection or fever. It may cause severe liver or brain damage.    Your appetite may be poor, so a light diet is fine. Avoid dehydration by drinking 6 to 8 glasses of fluids per day (water, soft drinks, juices, tea, or soup). Extra fluids will help loosen secretions in the nose and  lungs.    Over-the-counter cold medicines will not shorten the length of time you re sick, but they may be helpful for the following symptoms: cough, sore throat, and nasal and sinus congestion. (Note: Do not use decongestants if you have high blood pressure.)  Follow-up care  Follow up with your healthcare provider, or as advised.  When to seek medical advice  Call your healthcare provider right away if any of these occur:    Cough with lots of colored sputum (mucus)    Severe headache; face, neck, or ear pain    Difficulty swallowing due to throat pain    Fever of 100.4 F (38 C)  Call 911, or get immediate medical care  Call emergency services right away if any of these occur:    Chest pain, shortness of breath, wheezing, or difficulty breathing    Coughing up blood    Inability to swallow due to throat pain  Date Last Reviewed: 9/13/2015 2000-2017 [employer]. [address] [place] [address]. All rights reserved. This information is not intended as a substitute for professional medical care. Always follow your healthcare professional's instructions.                Follow-ups after your visit        Who to contact     If you have questions or need follow up information about today's clinic visit or your schedule please contact [facility] directly at [phone].  Normal or non-critical lab and imaging results will be communicated to you by MyChart, letter or phone within 4 business days after the clinic has received the results. If you do not hear from us within 7 days, please contact the clinic through MyChart or phone. If you have a critical or abnormal lab result, we will notify you by phone as soon as possible.  Submit refill requests through [employer] or call your pharmacy and they will forward the refill request to us. Please allow 3 business days for your refill to be completed.          Additional Information About Your Visit        MyChart Information      [employer] gives you secure access to your electronic health record. If you see a primary care provider, you can also send messages to your care team and make appointments. If you have questions, please call your primary care clinic.  If you do not have a primary care provider, please call [phone] and they will assist you.        Care EveryWhere ID     This is your Care EveryWhere ID. This could be used by other organizations to access your [place] medical records  [id number]        Your Vitals Were     Pulse Temperature Respirations Pulse Oximetry BMI (Body Mass Index)       105 98.4  F (36.9  C) (Oral) 20 96% 23.86 kg/m2        Blood Pressure from Last 3 Encounters:   03/24/18 130/90   11/17/17 120/76   10/26/17 116/78    Weight from Last 3 Encounters:   03/24/18 130 lb 7 oz (59.2 kg)   11/17/17 130 lb (59 kg)   10/26/17 131 lb (59.4 kg)              Today, you had the following     No orders found for display         Today's Medication Changes          These changes are accurate as of 3/24/18 11:19 AM.  If you have any questions, ask your nurse or doctor.               Start taking these medicines.        Dose/Directions    benzonatate 100 MG capsule   Commonly known as:  TESSALON   Used for:  Viral upper respiratory tract infection   Started by:  Connie Simmons PA-C        Dose:  100 mg   Take 1 capsule (100 mg) by mouth 3 times daily as needed for cough   Quantity:  30 capsule   Refills:  0       guaiFENesin-codeine 100-10 MG/5ML Soln solution   Commonly known as:  ROBITUSSIN AC   Used for:  Viral upper respiratory tract infection   Started by:  Connie Simmons PA-C        Dose:  1 tsp.   Take 5 mLs by mouth every 4 hours as needed for cough   Quantity:  120 mL   Refills:  0            Where to get your medicines      These medications were sent to [facility] [address] [address]     Phone:  [phone]      benzonatate 100 MG capsule         Some of these will need a paper prescription and others can be bought over the counter.  Ask your nurse if you have questions.     Bring a paper prescription for each of these medications     guaiFENesin-codeine 100-10 MG/5ML Soln solution                Primary Care Provider Office Phone # Fax #    Arsenio Smith [phone]       [address] [facility] [address]        Equal Access to Services     GUERA DE : Hadii aad ku hadasho Soomaali, waaxda luqadaha, qaybta kaalmada adeegyada, waxay idiin hayaan adeeg kharash la'aan . So [facility] [phone].    ATENCIÓN: Si habla español, tiene a álvarez disposición servicios gratuitos de asistencia lingüística. [facility] [phone].    We comply with applicable federal civil rights laws and [place] laws. We do not discriminate on the basis of race, color, national origin, age, disability, sex, sexual orientation, or gender identity.            Thank you!     Thank you for choosing [facility]  for your care. Our goal is always to provide you with excellent care. Hearing back from our patients is one way we can continue to improve our services. Please take a few minutes to complete the written survey that you may receive in the mail after your visit with us. Thank you!             Your Updated Medication List - Protect others around you: Learn how to safely use, store and throw away your medicines at www.disposemymeds.org.          This list is accurate as of 3/24/18 11:19 AM.  Always use your most recent med list.                   Brand Name Dispense Instructions for use Diagnosis    ACE/ARB/ARNI NOT PRESCRIBED (INTENTIONAL)      Please choose reason not prescribed, below    Type 2 diabetes mellitus without complication, without long-term current use of insulin (H)       benzonatate 100 MG capsule    TESSALON    30 capsule    Take 1 capsule (100 mg) by mouth 3 times daily as needed for  cough    Viral upper respiratory tract infection       blood glucose monitoring meter device kit     1 kit    Use to test blood sugars 3 times daily or as directed with appropriate meter test strips and lancets and ETOH swabs, # 1 month, refill times 11    Type 2 diabetes mellitus without complication, without long-term current use of insulin (H)       CINNAMON PO      Take  by mouth.        drospirenone-ethinyl estradiol 3-0.02 MG per tablet    DAE    84 tablet    Take 1 tablet by mouth daily    Encounter for surveillance of contraceptive pills       glimepiride 1 MG tablet    AMARYL    90 tablet    TAKE 1 TABLET EVERY MORNINGBEFORE BREAKFAST    Type 2 diabetes mellitus without complication, without long-term current use of insulin (H)       guaiFENesin-codeine 100-10 MG/5ML Soln solution    ROBITUSSIN AC    120 mL    Take 5 mLs by mouth every 4 hours as needed for cough    Viral upper respiratory tract infection       metFORMIN 500 MG tablet    GLUCOPHAGE    360 tablet    Take 2 tablets (1,000 mg) by mouth 2 times daily (with meals)    Type 2 diabetes mellitus without complication, without long-term current use of insulin (H)       multivitamin per tablet     100 Tab    ONE DAILY    Paresthesia       STATIN NOT PRESCRIBED (INTENTIONAL)     0 each    Statin not prescribed intentionally due to Active liver disease (fatty liver)    Fatty liver, Type 2 diabetes mellitus without complication, without long-term current use of insulin (H)

## 2018-03-24 NOTE — PATIENT INSTRUCTIONS

## 2018-03-25 DIAGNOSIS — E11.9 TYPE 2 DIABETES MELLITUS WITHOUT COMPLICATION, WITHOUT LONG-TERM CURRENT USE OF INSULIN (H): ICD-10-CM

## 2018-03-26 NOTE — TELEPHONE ENCOUNTER
Requested Prescriptions   Pending Prescriptions Disp Refills     metFORMIN (GLUCOPHAGE) 500 MG tablet [Pharmacy Med Name: METFORMIN TAB 500MG] 360 tablet 3      Last Written Prescription Date:  04/10/17  Last Fill Quantity: 360,  # refills: 3   Last office visit: 10/26/2017 with prescribing provider:  10/26/17   Future Office Visit: 0  Next 5 appointments (look out 90 days)     Mar 27, 2018  8:00 AM CDT   Office Visit with Bettie Gerber PA-C   [facility]    [address] [address] [phone]                Sig: TAKE 2 TABLETS (1,000MG)   TWO TIMES A DAY WITH MEALS    Biguanide Agents Failed    3/25/2018  8:25 AM       Failed - Blood pressure less than 140/90 in past 6 months    BP Readings from Last 3 Encounters:   03/24/18 130/90   11/17/17 120/76   10/26/17 116/78                Failed - Patient has had a Microalbumin in the past 12 mos.    Recent Labs   Lab Test  11/01/16   0945   MICROL  91   UMALCR  371.84*            Passed - Patient has documented LDL within the past 12 mos.    Recent Labs   Lab Test  04/10/17   1000   LDL  84            Passed - Patient is age 10 or older       Passed - Patient has documented A1c within the specified period of time.    Recent Labs   Lab Test  10/26/17   1105   A1C  6.9*            Passed - Patient's CR is NOT>1.4 OR Patient's EGFR is NOT<45 within past 12 mos.    Recent Labs   Lab Test  10/26/17   1105   GFRESTIMATED  >90   GFRESTBLACK  >90       Recent Labs   Lab Test  10/26/17   1105   CR  0.50*            Passed - Patient does NOT have a diagnosis of CHF.       Passed - Patient is not pregnant       Passed - Patient has not had a positive pregnancy test within the past 12 mos.        Passed - Recent (6 mo) or future (30 days) visit within the authorizing provider's specialty    Patient had office visit in the last 6 months or has a visit in the next 30 days with authorizing  "provider or within the authorizing provider's specialty.  See \"Patient Info\" tab in inbasket, or \"Choose Columns\" in Meds & Orders section of the refill encounter.            "

## 2018-03-27 ENCOUNTER — OFFICE VISIT (OUTPATIENT)
Dept: INTERNAL MEDICINE | Facility: CLINIC | Age: 48
End: 2018-03-27
Payer: COMMERCIAL

## 2018-03-27 VITALS
RESPIRATION RATE: 12 BRPM | HEART RATE: 98 BPM | OXYGEN SATURATION: 97 % | WEIGHT: 127.2 LBS | TEMPERATURE: 98.8 F | DIASTOLIC BLOOD PRESSURE: 88 MMHG | BODY MASS INDEX: 23.27 KG/M2 | SYSTOLIC BLOOD PRESSURE: 134 MMHG

## 2018-03-27 DIAGNOSIS — J06.9 UPPER RESPIRATORY TRACT INFECTION, UNSPECIFIED TYPE: Primary | ICD-10-CM

## 2018-03-27 DIAGNOSIS — R30.0 DYSURIA: ICD-10-CM

## 2018-03-27 LAB
ALBUMIN UR-MCNC: 100 MG/DL
APPEARANCE UR: CLEAR
BACTERIA #/AREA URNS HPF: ABNORMAL /HPF
BILIRUB UR QL STRIP: ABNORMAL
COLOR UR AUTO: YELLOW
GLUCOSE UR STRIP-MCNC: NEGATIVE MG/DL
HGB UR QL STRIP: ABNORMAL
KETONES UR STRIP-MCNC: ABNORMAL MG/DL
LEUKOCYTE ESTERASE UR QL STRIP: NEGATIVE
MUCOUS THREADS #/AREA URNS LPF: PRESENT /LPF
NITRATE UR QL: NEGATIVE
NON-SQ EPI CELLS #/AREA URNS LPF: ABNORMAL /LPF
PH UR STRIP: 6 PH (ref 5–7)
RBC #/AREA URNS AUTO: ABNORMAL /HPF
SOURCE: ABNORMAL
SP GR UR STRIP: 1.02 (ref 1–1.03)
UROBILINOGEN UR STRIP-ACNC: 0.2 EU/DL (ref 0.2–1)
WBC #/AREA URNS AUTO: ABNORMAL /HPF

## 2018-03-27 PROCEDURE — 81001 URINALYSIS AUTO W/SCOPE: CPT | Performed by: PHYSICIAN ASSISTANT

## 2018-03-27 PROCEDURE — 99213 OFFICE O/P EST LOW 20 MIN: CPT | Performed by: PHYSICIAN ASSISTANT

## 2018-03-27 NOTE — LETTER
March 27, 2018      Theresa Quarles  [address] [place] [address]        To Whom It May Concern:    Theresa Quarles  was seen on 3/27/2018  Please excuse her from work due to illness 3/27/18 and 3/28/2018.     Sincerely,        Bettie Gerber PA-C

## 2018-03-27 NOTE — MR AVS SNAPSHOT
After Visit Summary   3/27/2018    Theresa Quarles    MRN: [id number]           Patient Information     Date Of Birth          1970        Visit Information        Provider Department      3/27/2018 8:00 AM Bettie Gerber PA-C [facility]        Today's Diagnoses     Upper respiratory tract infection, unspecified type    -  1    Dysuria           Follow-ups after your visit        Who to contact     If you have questions or need follow up information about today's clinic visit or your schedule please contact [facility] directly at [phone].  Normal or non-critical lab and imaging results will be communicated to you by MyChart, letter or phone within 4 business days after the clinic has received the results. If you do not hear from us within 7 days, please contact the clinic through [employer] or phone. If you have a critical or abnormal lab result, we will notify you by phone as soon as possible.  Submit refill requests through [employer] or call your pharmacy and they will forward the refill request to us. Please allow 3 business days for your refill to be completed.          Additional Information About Your Visit        MyChart Information     [employer] gives you secure access to your electronic health record. If you see a primary care provider, you can also send messages to your care team and make appointments. If you have questions, please call your primary care clinic.  If you do not have a primary care provider, please call [phone] and they will assist you.        Care EveryWhere ID     This is your Care EveryWhere ID. This could be used by other organizations to access your [place] medical records  [id number]        Your Vitals Were     Pulse Temperature Respirations Pulse Oximetry BMI (Body Mass Index)       98 98.8  F (37.1  C) (Oral) 12 97% 23.27 kg/m2        Blood Pressure from Last 3 Encounters:   03/27/18 134/88   03/24/18  130/90   11/17/17 120/76    Weight from Last 3 Encounters:   03/27/18 127 lb 3.2 oz (57.7 kg)   03/24/18 130 lb 7 oz (59.2 kg)   11/17/17 130 lb (59 kg)              We Performed the Following     UA with Microscopic reflex to Culture        Primary Care Provider Office Phone # Fax #    Arsenio Smith [phone]       [address] [facility] [address]        Equal Access to Services     GUERA DE : Hadii aad ku hadasho Soomaali, waaxda luqadaha, qaybta kaalmada adeegyada, waxay idiin hayaan adeeg kharash xiomara . So [facility] [phone].    ATENCIÓN: Si habla español, tiene a álvarez disposición servicios gratuitos de asistencia lingüística. [facility] [phone].    We comply with applicable federal civil rights laws and [place] laws. We do not discriminate on the basis of race, color, national origin, age, disability, sex, sexual orientation, or gender identity.            Thank you!     Thank you for choosing [facility]  for your care. Our goal is always to provide you with excellent care. Hearing back from our patients is one way we can continue to improve our services. Please take a few minutes to complete the written survey that you may receive in the mail after your visit with us. Thank you!             Your Updated Medication List - Protect others around you: Learn how to safely use, store and throw away your medicines at www.disposemymeds.org.          This list is accurate as of 3/27/18  8:49 AM.  Always use your most recent med list.                   Brand Name Dispense Instructions for use Diagnosis    ACE/ARB/ARNI NOT PRESCRIBED (INTENTIONAL)      Please choose reason not prescribed, below    Type 2 diabetes mellitus without complication, without long-term current use of insulin (H)       benzonatate 100 MG capsule    TESSALON    30 capsule    Take 1 capsule (100 mg) by mouth 3 times daily as needed for cough    Viral upper respiratory tract infection        blood glucose monitoring meter device kit     1 kit    Use to test blood sugars 3 times daily or as directed with appropriate meter test strips and lancets and ETOH swabs, # 1 month, refill times 11    Type 2 diabetes mellitus without complication, without long-term current use of insulin (H)       CINNAMON PO      Take  by mouth.        drospirenone-ethinyl estradiol 3-0.02 MG per tablet    DAE    84 tablet    Take 1 tablet by mouth daily    Encounter for surveillance of contraceptive pills       glimepiride 1 MG tablet    AMARYL    90 tablet    TAKE 1 TABLET EVERY MORNINGBEFORE BREAKFAST    Type 2 diabetes mellitus without complication, without long-term current use of insulin (H)       guaiFENesin-codeine 100-10 MG/5ML Soln solution    ROBITUSSIN AC    120 mL    Take 5 mLs by mouth every 4 hours as needed for cough    Viral upper respiratory tract infection       metFORMIN 500 MG tablet    GLUCOPHAGE    360 tablet    Take 2 tablets (1,000 mg) by mouth 2 times daily (with meals)    Type 2 diabetes mellitus without complication, without long-term current use of insulin (H)       multivitamin per tablet     100 Tab    ONE DAILY    Paresthesia       STATIN NOT PRESCRIBED (INTENTIONAL)     0 each    Statin not prescribed intentionally due to Active liver disease (fatty liver)    Fatty liver, Type 2 diabetes mellitus without complication, without long-term current use of insulin (H)

## 2018-03-27 NOTE — PROGRESS NOTES
SUBJECTIVE:   Theresa Quarles is a 48 year old female who presents to clinic today for the following health issues:      ED/UC Followup:    Facility:  [facility]  Date of visit: 3/24/18  Reason for visit: cough  Current Status: still has cough, meds given at UC has not helped. Pt c/o flank pain, urgency to urinate and retention X 4 days     Cough x 4 days. Seen in UC on 3/24/18  Patient using tessalon perles- not helping much with coughing. She did not get the robitussin AC cough syrup.  Fatigued, did not sleep well last night due to coughing.  No fever. No sinus pain or pressure. No sore throat.         URINARY TRACT SYMPTOMS      Duration:  2 days with urgency and retention         Description  urgency and hesitancy    Intensity:  mild    Accompanying signs and symptoms:  Fever/chills: no   Flank pain no   Nausea and vomiting: no   Vaginal symptoms: none  Abdominal/Pelvic Pain: no     History  History of frequent UTI's: no   History of kidney stones: YES  Sexually Active: yes  Possibility of pregnancy: No    Precipitating or alleviating factors: None    Therapies tried and outcome: none   Outcome:       Problem list and histories reviewed & adjusted, as indicated.  Additional history: as documented    Labs reviewed in EPIC    Reviewed and updated as needed this visit by clinical staff       Reviewed and updated as needed this visit by Provider  Allergies  Meds         ROS:  Constitutional, HEENT, cardiovascular, pulmonary, gi and gu systems are negative, except as otherwise noted.    OBJECTIVE:     /88  Pulse 98  Temp 98.8  F (37.1  C) (Oral)  Resp 12  Wt 127 lb 3.2 oz (57.7 kg)  SpO2 97%  BMI 23.27 kg/m2  Body mass index is 23.27 kg/(m^2).  GENERAL: healthy, alert and no distress  HENT: normal cephalic/atraumatic, ear canals and TM's normal, nose and mouth without ulcers or lesions, nasal mucosa edematous , rhinorrhea clear, oropharynx clear and oral mucous membranes moist  NECK: no adenopathy, no  asymmetry, masses, or scars and thyroid normal to palpation  RESP: lungs clear to auscultation - no rales, rhonchi or wheezes  CV: regular rates and rhythm and normal S1 S2, no S3 or S4  SKIN: no suspicious lesions or rashes    Diagnostic Test Results:  Results for orders placed or performed in visit on 03/27/18 (from the past 24 hour(s))   UA with Microscopic reflex to Culture   Result Value Ref Range    Color Urine Yellow     Appearance Urine Clear     Glucose Urine Negative NEG^Negative mg/dL    Bilirubin Urine Small (A) NEG^Negative    Ketones Urine Trace (A) NEG^Negative mg/dL    Specific Gravity Urine 1.025 1.003 - 1.035    pH Urine 6.0 5.0 - 7.0 pH    Protein Albumin Urine 100 (A) NEG^Negative mg/dL    Urobilinogen Urine 0.2 0.2 - 1.0 EU/dL    Nitrite Urine Negative NEG^Negative    Blood Urine Trace (A) NEG^Negative    Leukocyte Esterase Urine Negative NEG^Negative    Source Midstream Urine     WBC Urine 0 - 5 OTO5^0 - 5 /HPF    RBC Urine O - 2 OTO2^O - 2 /HPF    Squamous Epithelial /LPF Urine Few FEW^Few /LPF    Bacteria Urine Many (A) NEG^Negative /HPF    Mucous Urine Present (A) NEG^Negative /LPF       ASSESSMENT/PLAN:             1. Upper respiratory tract infection, unspecified type      2. Dysuria  Appears contaminated with bacteria only no RBC or WBC  - UA with Microscopic reflex to Culture    Fluids rest  Letter off work today and tomorrow due to illness  Get Robitussin AC and use to help with coughing.        Bettie Gerber PA-C  [facility]

## 2018-04-23 ENCOUNTER — OFFICE VISIT (OUTPATIENT)
Dept: URGENT CARE | Facility: URGENT CARE | Age: 48
End: 2018-04-23
Payer: COMMERCIAL

## 2018-04-23 VITALS
WEIGHT: 129.6 LBS | DIASTOLIC BLOOD PRESSURE: 88 MMHG | SYSTOLIC BLOOD PRESSURE: 132 MMHG | RESPIRATION RATE: 20 BRPM | HEART RATE: 90 BPM | TEMPERATURE: 98.1 F | BODY MASS INDEX: 23.7 KG/M2

## 2018-04-23 DIAGNOSIS — L03.211 CELLULITIS OF FACE: Primary | ICD-10-CM

## 2018-04-23 PROCEDURE — 99213 OFFICE O/P EST LOW 20 MIN: CPT | Performed by: FAMILY MEDICINE

## 2018-04-23 RX ORDER — MUPIROCIN 20 MG/G
OINTMENT TOPICAL 3 TIMES DAILY
Qty: 22 G | Refills: 0 | Status: SHIPPED | OUTPATIENT
Start: 2018-04-23 | End: 2018-05-03

## 2018-04-23 RX ORDER — CLINDAMYCIN HCL 300 MG
300 CAPSULE ORAL 4 TIMES DAILY
Qty: 40 CAPSULE | Refills: 0 | Status: SHIPPED | OUTPATIENT
Start: 2018-04-23 | End: 2018-05-03

## 2018-04-23 NOTE — PROGRESS NOTES
SUBJECTIVE:Theresa Quarles is a 48 year old female who presents to the clinic today for a rash.  Onset of rash was 3 day(s) ago.   Rash is still present.   Location of the rash: chin.  Associated symptoms include: burning, painful and redness.    Symptoms are moderate and rash seems to be worsening.  Therapies tried to improve the rash: otc.  Previous history of a similar rash? No  Recent exposure history: none known    Past Medical History:   Diagnosis Date     Fatty liver 5/21/2012     GBS (group B streptococcus) infection 9/4/2008     gestational diabetes      Gestational diabetes mellitus, antepartum 3/9/2009     Hyperlipidemia LDL goal < 130 7/13/2010     Liver function test abnormality 7/13/2010     Pregnancies, high-risk 3/19/2009     Allergies   Allergen Reactions     Lisinopril Hives     Noted angioedema     Social History   Substance Use Topics     Smoking status: Never Smoker     Smokeless tobacco: Never Used     Alcohol use No       ROS:CONSTITUTIONAL:NEGATIVE for fever, chills, change in weight    EXAM: VITALS: /88  Pulse 90  Temp 98.1  F (36.7  C) (Oral)  Resp 20  Wt 129 lb 9.6 oz (58.8 kg)  BMI 23.7 kg/m2  General:healthy,alert,no distress    Location: chin     Distribution: localized     Lesion grouping: single patch and unilateral     Lesion type: macular     Color: red with tendernessPERTINENT EXAM: GENERAL APPEARANCE: healthy, alert and no distress      ICD-10-CM    1. Cellulitis of face L03.211 mupirocin (BACTROBAN) 2 % ointment     clindamycin (CLEOCIN) 300 MG capsule     DERMATOLOGY REFERRAL       Follow-up with primary clinic if not improving

## 2018-04-23 NOTE — MR AVS SNAPSHOT
After Visit Summary   4/23/2018    Theresa Quarles    MRN: [id number]           Patient Information     Date Of Birth          1970        Visit Information        Provider Department      4/23/2018 10:20 AM Arsenio Mora DO [facility]        Today's Diagnoses     Cellulitis of face    -  1       Follow-ups after your visit        Additional Services     DERMATOLOGY REFERRAL       Your provider has referred you to: FMG: [facility]atology [facility] [phone]   [email or web address]  UMP: [facility]ology Clinic [facility] [phone]   [email or web address]  N: [facility] Dermatology [facility] [phone]   [email or web address]  FHN: [facility] [phone]  [email or web address]  FHN: [facility] [place] [phone]   [email or web address]  [place] [phone]   [email or web address]  N: [facility] [place] [phone]   [email or web address]  N: [facility] [place] [phone]   [email or web address]  [facility]. [facility] [phone]   [email or web address]    Please be aware that coverage of these services is subject to the terms and limitations of your health insurance plan.  Call member services at your health plan with any benefit or coverage questions.      Please bring the following with you to your appointment:    (1) Any X-Rays, CTs or MRIs which have been performed.  Contact the facility where they were done to arrange for  prior to your scheduled appointment.    (2) List of current medications  (3) This referral request   (4) Any documents/labs given to you for this referral                  Who to contact     If you have questions or need follow up information about today's  clinic visit or your schedule please contact [facility] directly at [phone].  Normal or non-critical lab and imaging results will be communicated to you by MyChart, letter or phone within 4 business days after the clinic has received the results. If you do not hear from us within 7 days, please contact the clinic through [email or web address] or phone. If you have a critical or abnormal lab result, we will notify you by phone as soon as possible.  Submit refill requests through WebTuner or call your pharmacy and they will forward the refill request to us. Please allow 3 business days for your refill to be completed.          Additional Information About Your Visit        eucl3DharFLEx Lighting II Information     WebTuner gives you secure access to your electronic health record. If you see a primary care provider, you can also send messages to your care team and make appointments. If you have questions, please call your primary care clinic.  If you do not have a primary care provider, please call [phone] and they will assist you.        Care EveryWhere ID     This is your Care EveryWhere ID. This could be used by other organizations to access your [facility] medical records  [id number]        Your Vitals Were     Pulse Temperature Respirations BMI (Body Mass Index)          90 98.1  F (36.7  C) (Oral) 20 23.7 kg/m2         Blood Pressure from Last 3 Encounters:   04/23/18 132/88   03/27/18 134/88   03/24/18 130/90    Weight from Last 3 Encounters:   04/23/18 129 lb 9.6 oz (58.8 kg)   03/27/18 127 lb 3.2 oz (57.7 kg)   03/24/18 130 lb 7 oz (59.2 kg)              We Performed the Following     DERMATOLOGY REFERRAL          Today's Medication Changes          These changes are accurate as of 4/23/18 10:36 AM.  If you have any questions, ask your nurse or doctor.               Start taking these medicines.        Dose/Directions    clindamycin 300 MG capsule   Commonly known as:  CLEOCIN   Used for:  Cellulitis of  face   Started by:  Arsenio Mora DO        Dose:  300 mg   Take 1 capsule (300 mg) by mouth 4 times daily for 10 days   Quantity:  40 capsule   Refills:  0       mupirocin 2 % ointment   Commonly known as:  BACTROBAN   Used for:  Cellulitis of face   Started by:  Arsenio Mora DO        Apply topically 3 times daily for 10 days   Quantity:  22 g   Refills:  0            Where to get your medicines      These medications were sent to [facility] [address] [address]     Phone:  [phone]     clindamycin 300 MG capsule    mupirocin 2 % ointment                Primary Care Provider Office Phone # Fax #    Arsenio Smith [phone]       [address] [place] [phone]        Equal Access to Services     [facility]: Hadii aad ku hadasho Soomaali, waaxda luqadaha, qaybta kaalmada adeegyada, waxay idiin hayaan carlos kharamy solano . So [facility] [phone].    ATENCIÓN: Si habla español, tiene a álvarez disposición servicios gratuitos de asistencia lingüística. Llame al [phone].    We comply with applicable federal civil rights laws and [place] laws. We do not discriminate on the basis of race, color, national origin, age, disability, sex, sexual orientation, or gender identity.            Thank you!     Thank you for choosing [facility]  for your care. Our goal is always to provide you with excellent care. Hearing back from our patients is one way we can continue to improve our services. Please take a few minutes to complete the written survey that you may receive in the mail after your visit with us. Thank you!             Your Updated Medication List - Protect others around you: Learn how to safely use, store and throw away your medicines at www.disposemymeds.org.          This list is accurate as of 4/23/18 10:36 AM.  Always use your most recent med list.                    Brand Name Dispense Instructions for use Diagnosis    ACE/ARB/ARNI NOT PRESCRIBED (INTENTIONAL)      Please choose reason not prescribed, below    Type 2 diabetes mellitus without complication, without long-term current use of insulin (H)       benzonatate 100 MG capsule    TESSALON    30 capsule    Take 1 capsule (100 mg) by mouth 3 times daily as needed for cough    Viral upper respiratory tract infection       blood glucose monitoring meter device kit     1 kit    Use to test blood sugars 3 times daily or as directed with appropriate meter test strips and lancets and ETOH swabs, # 1 month, refill times 11    Type 2 diabetes mellitus without complication, without long-term current use of insulin (H)       CINNAMON PO      Take  by mouth.        clindamycin 300 MG capsule    CLEOCIN    40 capsule    Take 1 capsule (300 mg) by mouth 4 times daily for 10 days    Cellulitis of face       drospirenone-ethinyl estradiol 3-0.02 MG per tablet    DAE    84 tablet    Take 1 tablet by mouth daily    Encounter for surveillance of contraceptive pills       glimepiride 1 MG tablet    AMARYL    90 tablet    TAKE 1 TABLET EVERY MORNINGBEFORE BREAKFAST    Type 2 diabetes mellitus without complication, without long-term current use of insulin (H)       guaiFENesin-codeine 100-10 MG/5ML Soln solution    ROBITUSSIN AC    120 mL    Take 5 mLs by mouth every 4 hours as needed for cough    Viral upper respiratory tract infection       metFORMIN 500 MG tablet    GLUCOPHAGE    360 tablet    TAKE 2 TABLETS (1,000MG)   TWO TIMES A DAY WITH MEALS    Type 2 diabetes mellitus without complication, without long-term current use of insulin (H)       multivitamin per tablet     100 Tab    ONE DAILY    Paresthesia       mupirocin 2 % ointment    BACTROBAN    22 g    Apply topically 3 times daily for 10 days    Cellulitis of face       STATIN NOT PRESCRIBED (INTENTIONAL)     0 each    Statin not prescribed intentionally due to Active liver disease  (fatty liver)    Fatty liver, Type 2 diabetes mellitus without complication, without long-term current use of insulin (H)

## 2018-04-26 ENCOUNTER — TRANSFERRED RECORDS (OUTPATIENT)
Dept: HEALTH INFORMATION MANAGEMENT | Facility: CLINIC | Age: 48
End: 2018-04-26

## 2018-05-03 ENCOUNTER — TRANSFERRED RECORDS (OUTPATIENT)
Dept: HEALTH INFORMATION MANAGEMENT | Facility: CLINIC | Age: 48
End: 2018-05-03

## 2018-05-14 ENCOUNTER — TRANSFERRED RECORDS (OUTPATIENT)
Dept: HEALTH INFORMATION MANAGEMENT | Facility: CLINIC | Age: 48
End: 2018-05-14

## 2018-05-17 ENCOUNTER — TRANSFERRED RECORDS (OUTPATIENT)
Dept: HEALTH INFORMATION MANAGEMENT | Facility: CLINIC | Age: 48
End: 2018-05-17

## 2018-05-21 ENCOUNTER — TRANSFERRED RECORDS (OUTPATIENT)
Dept: HEALTH INFORMATION MANAGEMENT | Facility: CLINIC | Age: 48
End: 2018-05-21

## 2018-06-18 ENCOUNTER — TRANSFERRED RECORDS (OUTPATIENT)
Dept: HEALTH INFORMATION MANAGEMENT | Facility: CLINIC | Age: 48
End: 2018-06-18

## 2018-06-23 DIAGNOSIS — E11.9 TYPE 2 DIABETES MELLITUS WITHOUT COMPLICATION, WITHOUT LONG-TERM CURRENT USE OF INSULIN (H): ICD-10-CM

## 2018-06-23 NOTE — LETTER
[facility]  [address] [id number] [phone]            Theresa Quarles  [address]  [place][1] [phone]        June 25, 2018    Dear Theresa,    While refilling your prescription today, we noticed that you are due to have labs drawn.  We will refill your prescription for 30 days, but a follow-up appointment must be made before any additional refills can be approved.     Taking care of your health is important to us and we look forward to seeing you in the near future.  Please call us at [phone] or [phone] (or use [employer]) to schedule an appointment.     Please disregard this notice if you have already made an appointment.    Sincerely,        [facility]

## 2018-06-24 NOTE — TELEPHONE ENCOUNTER
"Requested Prescriptions   Pending Prescriptions Disp Refills     glimepiride (AMARYL) 1 MG tablet [Pharmacy Med Name: GLIMEPIRIDE  TAB 1MG]  Last Written Prescription Date:  03/14/2018  Last Fill Quantity: 90,  # refills: 0   Last office visit: 3/27/2018 with prescribing provider:  ELIU   Future Office Visit:     90 tablet 0     Sig: TAKE 1 TABLET EVERY MORNINGBEFORE BREAKFAST    Sulfonylurea Agents Failed    6/23/2018  2:25 PM       Failed - Patient has documented LDL within the past 12 mos.    Recent Labs   Lab Test  04/10/17   1000   LDL  84            Failed - Patient has had a Microalbumin in the past 12 mos.    Recent Labs   Lab Test  11/01/16   0945   MICROL  91   UMALCR  371.84*            Failed - Patient has documented A1c within the specified period of time.    If HgbA1C is 8 or greater, it needs to be on file within the past 3 months.  If less than 8, must be on file within the past 6 months.     Recent Labs   Lab Test  10/26/17   1105   A1C  6.9*            Failed - Patient has a recent creatinine (normal) within the past 12 mos.    Recent Labs   Lab Test  10/26/17   1105   CR  0.50*            Passed - Blood pressure less than 140/90 in past 6 months    BP Readings from Last 3 Encounters:   04/23/18 132/88   03/27/18 134/88   03/24/18 130/90                Passed - Patient is age 18 or older       Passed - No active pregnancy on record       Passed - Patient has not had a positive pregnancy test within the past 12 mos.       Passed - Recent (6 mo) or future (30 days) visit within the authorizing provider's specialty    Patient had office visit in the last 6 months or has a visit in the next 30 days with authorizing provider or within the authorizing provider's specialty.  See \"Patient Info\" tab in inbasket, or \"Choose Columns\" in Meds & Orders section of the refill encounter.            metFORMIN (GLUCOPHAGE) 500 MG tablet [Pharmacy Med Name: METFORMIN TAB 500MG]  Last Written Prescription Date:  " "03/27/2018  Last Fill Quantity: 360,  # refills: 0   Last office visit: 3/27/2018 with prescribing provider:  ELIU   Future Office Visit:     360 tablet 0     Sig: TAKE 2 TABLETS (1,000MG)   TWO TIMES A DAY WITH MEALS    Biguanide Agents Failed    6/23/2018  2:25 PM       Failed - Patient has documented LDL within the past 12 mos.    Recent Labs   Lab Test  04/10/17   1000   LDL  84            Failed - Patient has had a Microalbumin in the past 12 mos.    Recent Labs   Lab Test  11/01/16   0945   MICROL  91   UMALCR  371.84*            Failed - Patient has documented A1c within the specified period of time.    If HgbA1C is 8 or greater, it needs to be on file within the past 3 months.  If less than 8, must be on file within the past 6 months.     Recent Labs   Lab Test  10/26/17   1105   A1C  6.9*            Passed - Blood pressure less than 140/90 in past 6 months    BP Readings from Last 3 Encounters:   04/23/18 132/88   03/27/18 134/88   03/24/18 130/90                Passed - Patient is age 10 or older       Passed - Patient's CR is NOT>1.4 OR Patient's EGFR is NOT<45 within past 12 mos.    Recent Labs   Lab Test  10/26/17   1105   GFRESTIMATED  >90   GFRESTBLACK  >90       Recent Labs   Lab Test  10/26/17   1105   CR  0.50*            Passed - Patient does NOT have a diagnosis of CHF.       Passed - Patient is not pregnant       Passed - Patient has not had a positive pregnancy test within the past 12 mos.        Passed - Recent (6 mo) or future (30 days) visit within the authorizing provider's specialty    Patient had office visit in the last 6 months or has a visit in the next 30 days with authorizing provider or within the authorizing provider's specialty.  See \"Patient Info\" tab in inbasket, or \"Choose Columns\" in Meds & Orders section of the refill encounter.              "

## 2018-06-25 RX ORDER — GLIMEPIRIDE 1 MG/1
TABLET ORAL
Qty: 30 TABLET | Refills: 0 | Status: SHIPPED | OUTPATIENT
Start: 2018-06-25 | End: 2018-08-09

## 2018-06-25 NOTE — TELEPHONE ENCOUNTER
Medication is being filled for 1 time refill only due to:  Patient needs labs , future orders placed.

## 2018-07-14 DIAGNOSIS — E11.9 TYPE 2 DIABETES MELLITUS WITHOUT COMPLICATION, WITHOUT LONG-TERM CURRENT USE OF INSULIN (H): ICD-10-CM

## 2018-07-14 NOTE — TELEPHONE ENCOUNTER
"Requested Prescriptions   Pending Prescriptions Disp Refills     glimepiride (AMARYL) 1 MG tablet [Pharmacy Med Name: GLIMEPIRIDE  TAB 1MG]  Last Written Prescription Date:  06/25/2018  Last Fill Quantity: 30 tablet,  # refills: 0   Last office visit: 3/27/2018 with prescribing provider:  Bettie Gerber PA-C    Future Office Visit:     30 tablet 0     Sig: TAKE 1 TABLET EVERY MORNINGBEFORE BREAKFAST. ONE TIME REFILL DUE TO: PATIENT     NEEDS LABS    Sulfonylurea Agents Failed    7/14/2018 10:55 AM       Failed - Patient has documented LDL within the past 12 mos.    Recent Labs   Lab Test  04/10/17   1000   LDL  84            Failed - Patient has had a Microalbumin in the past 12 mos.    Recent Labs   Lab Test  11/01/16   0945   MICROL  91   UMALCR  371.84*            Failed - Patient has documented A1c within the specified period of time.    If HgbA1C is 8 or greater, it needs to be on file within the past 3 months.  If less than 8, must be on file within the past 6 months.     Recent Labs   Lab Test  10/26/17   1105   A1C  6.9*            Failed - Patient has a recent creatinine (normal) within the past 12 mos.    Recent Labs   Lab Test  10/26/17   1105   CR  0.50*            Passed - Blood pressure less than 140/90 in past 6 months    BP Readings from Last 3 Encounters:   04/23/18 132/88   03/27/18 134/88   03/24/18 130/90                Passed - Patient is age 18 or older       Passed - No active pregnancy on record       Passed - Patient has not had a positive pregnancy test within the past 12 mos.       Passed - Recent (6 mo) or future (30 days) visit within the authorizing provider's specialty    Patient had office visit in the last 6 months or has a visit in the next 30 days with authorizing provider or within the authorizing provider's specialty.  See \"Patient Info\" tab in inbasket, or \"Choose Columns\" in Meds & Orders section of the refill encounter.            metFORMIN (GLUCOPHAGE) 500 MG tablet " "[Pharmacy Med Name: METFORMIN TAB 500MG]  Last Written Prescription Date:  06/25/2018  Last Fill Quantity: 120 tablet,  # refills: 0   Last office visit: 3/27/2018 with prescribing provider:  Bettie Gerber PA-C    Future Office Visit:     120 tablet 0     Sig: FOR DIRECTIONS ON HOW TO   TAKE THIS MEDICINE, READ   THE ENCLOSED MEDICATION    INFORMATION FORM    Biguanide Agents Failed    7/14/2018 10:55 AM       Failed - Patient has documented LDL within the past 12 mos.    Recent Labs   Lab Test  04/10/17   1000   LDL  84            Failed - Patient has had a Microalbumin in the past 12 mos.    Recent Labs   Lab Test  11/01/16   0945   MICROL  91   UMALCR  371.84*            Failed - Patient has documented A1c within the specified period of time.    If HgbA1C is 8 or greater, it needs to be on file within the past 3 months.  If less than 8, must be on file within the past 6 months.     Recent Labs   Lab Test  10/26/17   1105   A1C  6.9*            Passed - Blood pressure less than 140/90 in past 6 months    BP Readings from Last 3 Encounters:   04/23/18 132/88   03/27/18 134/88   03/24/18 130/90                Passed - Patient is age 10 or older       Passed - Patient's CR is NOT>1.4 OR Patient's EGFR is NOT<45 within past 12 mos.    Recent Labs   Lab Test  10/26/17   1105   GFRESTIMATED  >90   GFRESTBLACK  >90       Recent Labs   Lab Test  10/26/17   1105   CR  0.50*            Passed - Patient does NOT have a diagnosis of CHF.       Passed - Patient is not pregnant       Passed - Patient has not had a positive pregnancy test within the past 12 mos.        Passed - Recent (6 mo) or future (30 days) visit within the authorizing provider's specialty    Patient had office visit in the last 6 months or has a visit in the next 30 days with authorizing provider or within the authorizing provider's specialty.  See \"Patient Info\" tab in inbasket, or \"Choose Columns\" in Meds & Orders section of the refill " encounter.

## 2018-07-17 RX ORDER — GLIMEPIRIDE 1 MG/1
TABLET ORAL
Qty: 30 TABLET | Refills: 0 | OUTPATIENT
Start: 2018-07-17

## 2018-07-17 NOTE — TELEPHONE ENCOUNTER
Routing refill request to provider for review/approval because:  Luz Marina given x1 and patient did not follow up, please advise  Labs not current

## 2018-07-24 ENCOUNTER — TRANSFERRED RECORDS (OUTPATIENT)
Dept: HEALTH INFORMATION MANAGEMENT | Facility: CLINIC | Age: 48
End: 2018-07-24

## 2018-08-09 ENCOUNTER — OFFICE VISIT (OUTPATIENT)
Dept: INTERNAL MEDICINE | Facility: CLINIC | Age: 48
End: 2018-08-09
Payer: COMMERCIAL

## 2018-08-09 VITALS
RESPIRATION RATE: 15 BRPM | HEIGHT: 62 IN | DIASTOLIC BLOOD PRESSURE: 78 MMHG | BODY MASS INDEX: 23.89 KG/M2 | OXYGEN SATURATION: 98 % | TEMPERATURE: 98 F | WEIGHT: 129.8 LBS | SYSTOLIC BLOOD PRESSURE: 126 MMHG | HEART RATE: 90 BPM

## 2018-08-09 DIAGNOSIS — E11.9 TYPE 2 DIABETES MELLITUS WITHOUT COMPLICATION, WITHOUT LONG-TERM CURRENT USE OF INSULIN (H): Primary | ICD-10-CM

## 2018-08-09 DIAGNOSIS — Z13.89 SCREENING FOR DIABETIC PERIPHERAL NEUROPATHY: ICD-10-CM

## 2018-08-09 LAB
ALBUMIN SERPL-MCNC: 3.8 G/DL (ref 3.4–5)
ALP SERPL-CCNC: 71 U/L (ref 40–150)
ALT SERPL W P-5'-P-CCNC: 67 U/L (ref 0–50)
ANION GAP SERPL CALCULATED.3IONS-SCNC: 8 MMOL/L (ref 3–14)
AST SERPL W P-5'-P-CCNC: 44 U/L (ref 0–45)
BILIRUB SERPL-MCNC: 0.6 MG/DL (ref 0.2–1.3)
BUN SERPL-MCNC: 6 MG/DL (ref 7–30)
CALCIUM SERPL-MCNC: 8.4 MG/DL (ref 8.5–10.1)
CHLORIDE SERPL-SCNC: 100 MMOL/L (ref 94–109)
CHOLEST SERPL-MCNC: 222 MG/DL
CO2 SERPL-SCNC: 29 MMOL/L (ref 20–32)
CREAT SERPL-MCNC: 0.5 MG/DL (ref 0.52–1.04)
GFR SERPL CREATININE-BSD FRML MDRD: >90 ML/MIN/1.7M2
GLUCOSE SERPL-MCNC: 102 MG/DL (ref 70–99)
HBA1C MFR BLD: 7.1 % (ref 0–5.6)
HDLC SERPL-MCNC: 45 MG/DL
LDLC SERPL CALC-MCNC: 112 MG/DL
NONHDLC SERPL-MCNC: 177 MG/DL
POTASSIUM SERPL-SCNC: 3.7 MMOL/L (ref 3.4–5.3)
PROT SERPL-MCNC: 7.5 G/DL (ref 6.8–8.8)
SODIUM SERPL-SCNC: 137 MMOL/L (ref 133–144)
TRIGL SERPL-MCNC: 324 MG/DL

## 2018-08-09 PROCEDURE — 80053 COMPREHEN METABOLIC PANEL: CPT | Performed by: INTERNAL MEDICINE

## 2018-08-09 PROCEDURE — 99207 C FOOT EXAM  NO CHARGE: CPT | Mod: 25 | Performed by: INTERNAL MEDICINE

## 2018-08-09 PROCEDURE — 83036 HEMOGLOBIN GLYCOSYLATED A1C: CPT | Performed by: INTERNAL MEDICINE

## 2018-08-09 PROCEDURE — 36415 COLL VENOUS BLD VENIPUNCTURE: CPT | Performed by: INTERNAL MEDICINE

## 2018-08-09 PROCEDURE — 99213 OFFICE O/P EST LOW 20 MIN: CPT | Performed by: INTERNAL MEDICINE

## 2018-08-09 PROCEDURE — 80061 LIPID PANEL: CPT | Performed by: INTERNAL MEDICINE

## 2018-08-09 PROCEDURE — 82043 UR ALBUMIN QUANTITATIVE: CPT | Performed by: INTERNAL MEDICINE

## 2018-08-09 RX ORDER — GLIMEPIRIDE 1 MG/1
1 TABLET ORAL
Qty: 90 TABLET | Refills: 3 | Status: SHIPPED | OUTPATIENT
Start: 2018-08-09 | End: 2019-06-06 | Stop reason: DRUGHIGH

## 2018-08-09 NOTE — LETTER
[facility]  [address] [address]  [phone]      8/9/2018       Theresa Quarles  [address] [place] [address]        Dear Theresa,      Your Hemoglobin A1C and blood sugar tests look good and I would continue with your medication without change.  These tests should be repeated in 6 months.    Your cholesterol is high and could be treated more aggressively.  Please follow-up with me to discuss your further medication options if you are interested.    One of your liver function tests is also slightly abnormal and should be rechecked here in the clinic in one month with a follow-up visit with me.  I will look forward to seeing you at that time and please call to make an appointment.      Sincerely,      Arsenio Smith MD  Internal Medicine

## 2018-08-09 NOTE — PROGRESS NOTES
SUBJECTIVE:   Theresa Quarles is a 48 year old female who presents to clinic today for the following health issues:      Diabetes Follow-up      Patient is checking blood sugars: not at all    Diabetic concerns: None     Symptoms of hypoglycemia (low blood sugar): none     Paresthesias (numbness or burning in feet) or sores: Yes, numbness     Date of last diabetic eye exam: 12/2017    BP Readings from Last 2 Encounters:   04/23/18 132/88   03/27/18 134/88     Hemoglobin A1C (%)   Date Value   10/26/2017 6.9 (H)   04/10/2017 6.6 (H)     LDL Cholesterol Calculated (mg/dL)   Date Value   04/10/2017 84   10/24/2016 122 (H)       Diabetes Management Resources    Amount of exercise or physical activity: 4-5 days/week for an average of 45-60 minutes    Problems taking medications regularly: No    Medication side effects: none    Diet: regular (no restrictions)      Problem list and histories reviewed & adjusted, as indicated.  Additional history: as documented    Patient Active Problem List   Diagnosis     Gestational diabetes mellitus, antepartum     Liver function test abnormality     Hyperlipidemia with target LDL less than 130     History of gestational diabetes mellitus, not currently pregnant     Butler's metatarsalgia, neuralgia, or neuroma     Foot joint pain     Abnormality of gait     Other postprocedural status(V45.89)     Other peripheral enthesopathies     Fatty liver     Pyelonephritis     General counseling for prescription of oral contraceptives     Type 2 diabetes mellitus without complication, without long-term current use of insulin (H)     Past Surgical History:   Procedure Laterality Date     BIOPSY BREAST  2012    needle bx benign     HC EXCISE HAND/FOOT NEUROMA       HC REMOVAL OF TONSILS,12+ Y/O  2001    Tonsils 14+y.o.       Social History   Substance Use Topics     Smoking status: Never Smoker     Smokeless tobacco: Never Used     Alcohol use No     Family History   Problem Relation Age of Onset      Cerebrovascular Disease Father           Thyroid Disease Mother      s/p thyrodectomy     Cancer - colorectal Mother      rectal cancer         Current Outpatient Prescriptions   Medication Sig Dispense Refill     blood glucose monitoring (ACCU-CHEK BECKY PLUS) meter device kit Use to test blood sugars 3 times daily or as directed with appropriate meter test strips and lancets and ETOH swabs, # 1 month, refill times 11 1 kit 5     CINNAMON PO Take  by mouth.       glimepiride (AMARYL) 1 MG tablet TAKE 1 TABLET EVERY MORNINGBEFORE BREAKFAST 30 tablet 0     metFORMIN (GLUCOPHAGE) 500 MG tablet TAKE 2 TABLETS (1,000MG)   TWO TIMES A DAY WITH MEALS 120 tablet 0     MULTIVITAMINS PO TABS ONE DAILY 100 Tab 3     ACE/ARB/ARNI NOT PRESCRIBED, INTENTIONAL, Please choose reason not prescribed, below       STATIN NOT PRESCRIBED, INTENTIONAL, Statin not prescribed intentionally due to Active liver disease (fatty liver) 0 each 0     Allergies   Allergen Reactions     Lisinopril Hives     Noted angioedema     Recent Labs   Lab Test  10/26/17   1105  04/10/17   1000  10/24/16   1025  05/07/15   0859   12   1039   A1C  6.9*  6.6*  8.3*   --    --   6.5*   LDL   --   84  122*   --    --   106   HDL   --   47*  47*   --    --   58   TRIG   --   387*  303*   --    --   146   ALT   --   41  79*  69*   < >  70*   CR  0.50*  0.52  0.50*  0.57   < >  0.64   GFRESTIMATED  >90  >90  Non African American GFR Calc    >90  Non  GFR Calc    >90  Non  GFR Calc     < >  >90   GFRESTBLACK  >90  >90  African American GFR Calc    >90   GFR Calc    >90   GFR Calc     < >  >90   POTASSIUM  3.7  3.8  3.6  3.7   < >  4.2   TSH  3.87   --   3.99   --    --    --     < > = values in this interval not displayed.      BP Readings from Last 3 Encounters:   18 126/78   18 132/88   18 134/88    Wt Readings from Last 3 Encounters:   18 129 lb 12.8 oz (58.9  "kg)   04/23/18 129 lb 9.6 oz (58.8 kg)   03/27/18 127 lb 3.2 oz (57.7 kg)           Labs reviewed in EPIC    Reviewed and updated as needed this visit by clinical staff  Tobacco  Allergies  Meds  Med Hx  Surg Hx  Fam Hx  Soc Hx      Reviewed and updated as needed this visit by Provider       ROS:  CONSTITUTIONAL: NEGATIVE for fever, chills, change in weight  ENT/MOUTH: NEGATIVE for ear, mouth and throat problems  RESP: NEGATIVE for significant cough or SOB  CV: NEGATIVE for chest pain, palpitations or peripheral edema  GI: NEGATIVE for nausea, abdominal pain, heartburn, or change in bowel habits  : NEGATIVE for frequency, dysuria, or hematuria  MUSCULOSKELETAL: NEGATIVE for significant arthralgias or myalgia  NEURO: NEGATIVE for weakness, dizziness or paresthesias  HEME: NEGATIVE for bleeding problems  PSYCHIATRIC: NEGATIVE for changes in mood or affect    OBJECTIVE:                                                    /78  Pulse 90  Temp 98  F (36.7  C) (Oral)  Resp 15  Ht 5' 2\" (1.575 m)  Wt 129 lb 12.8 oz (58.9 kg)  LMP 08/02/2018 (Approximate)  SpO2 98%  Breastfeeding? No  BMI 23.74 kg/m2  Body mass index is 23.74 kg/(m^2).  GENERAL: healthy, alert and no distress  EYES: Eyes grossly normal to inspection, extraocular movements - intact, and PERRL  HENT: ear canals- normal; TMs- normal; Nose- normal; Mouth- no ulcers, no lesions  NECK: no tenderness, no adenopathy, no asymmetry, no masses, no stiffness; thyroid- normal to palpation  RESP: lungs clear to auscultation - no rales, no rhonchi, no wheezes  CV: regular rates and rhythm, normal S1 S2, no S3 or S4 and no click or rub   MS: extremities- no gross deformities noted  NEURO: no focal changes  PSYCH: Alert and oriented times 3; speech- coherent , normal rate and volume; able to articulate logical thoughts, able to abstract reason, no tangential thoughts, no hallucinations or delusions, affect- normal       ASSESSMENT/PLAN:                  "                                   1. Type 2 diabetes mellitus without complication, without long-term current use of insulin (H)  Encouraged patient to make sure that she checks her blood sugars  - metFORMIN (GLUCOPHAGE) 500 MG tablet; Take 1 tablet (500 mg) by mouth 2 times daily (with meals)  Dispense: 180 tablet; Refill: 3  - glimepiride (AMARYL) 1 MG tablet; Take 1 tablet (1 mg) by mouth every morning (before breakfast)  Dispense: 90 tablet; Refill: 3    2. Screening for diabetic peripheral neuropathy  Able without acute change  - FOOT EXAM  NO CHARGE [59759.135]      See Patient Instructions advised that she is due for mammography and routine annual examination at the end of October of this year.    Arsenio Smith MD  [facility]

## 2018-08-09 NOTE — MR AVS SNAPSHOT
After Visit Summary   8/9/2018    Theresa Quarles    MRN: [id number]           Patient Information     Date Of Birth          1970        Visit Information        Provider Department      8/9/2018 9:20 AM Arsenio Smith MD [facility]        Today's Diagnoses     Type 2 diabetes mellitus without complication, without long-term current use of insulin (H)    -  1    Screening for diabetic peripheral neuropathy           Follow-ups after your visit        Follow-up notes from your care team     Return if symptoms worsen or fail to improve.      Who to contact     If you have questions or need follow up information about today's clinic visit or your schedule please contact [facility] directly at [phone].  Normal or non-critical lab and imaging results will be communicated to you by Keepstreamhart, letter or phone within 4 business days after the clinic has received the results. If you do not hear from us within 7 days, please contact the clinic through Keepstreamhart or phone. If you have a critical or abnormal lab result, we will notify you by phone as soon as possible.  Submit refill requests through [email or web address] or call your pharmacy and they will forward the refill request to us. Please allow 3 business days for your refill to be completed.          Additional Information About Your Visit        MyChart Information     [email or web address] gives you secure access to your electronic health record. If you see a primary care provider, you can also send messages to your care team and make appointments. If you have questions, please call your primary care clinic.  If you do not have a primary care provider, please call [phone] and they will assist you.        Care EveryWhere ID     This is your Care EveryWhere ID. This could be used by other organizations to access your [place] medical records  [id number]        Your Vitals Were     Pulse Temperature Respirations Height  "Last Period Pulse Oximetry    90 98  F (36.7  C) (Oral) 15 5' 2\" (1.575 m) 08/02/2018 (Approximate) 98%    Breastfeeding? BMI (Body Mass Index)                No 23.74 kg/m2           Blood Pressure from Last 3 Encounters:   08/09/18 126/78   04/23/18 132/88   03/27/18 134/88    Weight from Last 3 Encounters:   08/09/18 129 lb 12.8 oz (58.9 kg)   04/23/18 129 lb 9.6 oz (58.8 kg)   03/27/18 127 lb 3.2 oz (57.7 kg)              We Performed the Following     Albumin Random Urine Quantitative with Creat Ratio     Comprehensive metabolic panel     FOOT EXAM  NO CHARGE [53981.114]     HEMOGLOBIN A1C     Lipid panel reflex to direct LDL Fasting          Today's Medication Changes          These changes are accurate as of 8/9/18  9:37 AM.  If you have any questions, ask your nurse or doctor.               These medicines have changed or have updated prescriptions.        Dose/Directions    glimepiride 1 MG tablet   Commonly known as:  AMARYL   This may have changed:  See the new instructions.   Used for:  Type 2 diabetes mellitus without complication, without long-term current use of insulin (H)   Changed by:  Arsenio Smith MD        Dose:  1 mg   Take 1 tablet (1 mg) by mouth every morning (before breakfast)   Quantity:  90 tablet   Refills:  3       metFORMIN 500 MG tablet   Commonly known as:  GLUCOPHAGE   This may have changed:  See the new instructions.   Used for:  Type 2 diabetes mellitus without complication, without long-term current use of insulin (H)   Changed by:  Arsenio Smith MD        Dose:  500 mg   Take 1 tablet (500 mg) by mouth 2 times daily (with meals)   Quantity:  180 tablet   Refills:  3            Where to get your medicines      These medications were sent to [facility] - [place], [place] - [address] AT [address] [place] [id number], [facility] [address]     Phone:  [phone]     glimepiride 1 MG tablet    metFORMIN 500 MG tablet          "       Primary Care Provider Office Phone # Fax #    Arsenio Smith [phone]       [address] [facility] [address]        Equal Access to Services     GUERA DE : Hadii aad ku hadalexyso Sobijuali, waaxda luqadaha, qaybta kaalmada adefishyada, aaron jamesmarsha osuna. So [facility] [phone].    ATENCIÓN: Si habla español, tiene a álvarez disposición servicios gratuitos de asistencia lingüística. Llame al [phone].    We comply with applicable federal civil rights laws and [place] laws. We do not discriminate on the basis of race, color, national origin, age, disability, sex, sexual orientation, or gender identity.            Thank you!     Thank you for choosing [facility]  for your care. Our goal is always to provide you with excellent care. Hearing back from our patients is one way we can continue to improve our services. Please take a few minutes to complete the written survey that you may receive in the mail after your visit with us. Thank you!             Your Updated Medication List - Protect others around you: Learn how to safely use, store and throw away your medicines at www.disposemymeds.org.          This list is accurate as of 8/9/18  9:37 AM.  Always use your most recent med list.                   Brand Name Dispense Instructions for use Diagnosis    ACE/ARB/ARNI NOT PRESCRIBED (INTENTIONAL)      Please choose reason not prescribed, below    Type 2 diabetes mellitus without complication, without long-term current use of insulin (H)       blood glucose monitoring meter device kit     1 kit    Use to test blood sugars 3 times daily or as directed with appropriate meter test strips and lancets and ETOH swabs, # 1 month, refill times 11    Type 2 diabetes mellitus without complication, without long-term current use of insulin (H)       CINNAMON PO      Take  by mouth.        glimepiride 1 MG tablet    AMARYL    90 tablet    Take 1 tablet  (1 mg) by mouth every morning (before breakfast)    Type 2 diabetes mellitus without complication, without long-term current use of insulin (H)       metFORMIN 500 MG tablet    GLUCOPHAGE    180 tablet    Take 1 tablet (500 mg) by mouth 2 times daily (with meals)    Type 2 diabetes mellitus without complication, without long-term current use of insulin (H)       multivitamin per tablet     100 Tab    ONE DAILY    Paresthesia       STATIN NOT PRESCRIBED (INTENTIONAL)     0 each    Statin not prescribed intentionally due to Active liver disease (fatty liver)    Fatty liver, Type 2 diabetes mellitus without complication, without long-term current use of insulin (H)

## 2018-08-09 NOTE — Clinical Note
Please abstract the following data from this visit with this patient into the appropriate field in Epic:  Eye exam with ophthalmology on this date: 12/2017

## 2018-08-10 LAB
CREAT UR-MCNC: 69 MG/DL
MICROALBUMIN UR-MCNC: 50 MG/L
MICROALBUMIN/CREAT UR: 71.88 MG/G CR (ref 0–25)

## 2018-08-23 ENCOUNTER — OFFICE VISIT (OUTPATIENT)
Dept: INTERNAL MEDICINE | Facility: CLINIC | Age: 48
End: 2018-08-23
Payer: COMMERCIAL

## 2018-08-23 VITALS
WEIGHT: 131.6 LBS | OXYGEN SATURATION: 97 % | DIASTOLIC BLOOD PRESSURE: 82 MMHG | SYSTOLIC BLOOD PRESSURE: 130 MMHG | TEMPERATURE: 97.7 F | HEART RATE: 72 BPM | RESPIRATION RATE: 14 BRPM | BODY MASS INDEX: 24.07 KG/M2

## 2018-08-23 DIAGNOSIS — K76.0 FATTY LIVER: ICD-10-CM

## 2018-08-23 DIAGNOSIS — E11.9 TYPE 2 DIABETES MELLITUS WITHOUT COMPLICATION, WITHOUT LONG-TERM CURRENT USE OF INSULIN (H): Primary | ICD-10-CM

## 2018-08-23 DIAGNOSIS — E78.5 HYPERLIPIDEMIA WITH TARGET LDL LESS THAN 130: ICD-10-CM

## 2018-08-23 PROCEDURE — 99214 OFFICE O/P EST MOD 30 MIN: CPT | Performed by: INTERNAL MEDICINE

## 2018-08-23 RX ORDER — ATORVASTATIN CALCIUM 10 MG/1
10 TABLET, FILM COATED ORAL DAILY
Qty: 90 TABLET | Refills: 3 | Status: SHIPPED | OUTPATIENT
Start: 2018-08-23 | End: 2019-06-06

## 2018-08-23 NOTE — MR AVS SNAPSHOT
"              After Visit Summary   8/23/2018    Theresa Quarles    MRN: [id number]           Patient Information     Date Of Birth          1970        Visit Information        Provider Department      8/23/2018 9:20 AM Arsenio Smith MD [facility]        Today's Diagnoses     Type 2 diabetes mellitus without complication, without long-term current use of insulin (H)    -  1    Fatty liver        Hyperlipidemia with target LDL less than 130           Follow-ups after your visit        Follow-up notes from your care team     Return in about 4 weeks (around 9/20/2018) for Lab Work.      Your next 10 appointments already scheduled     Oct 29, 2018  9:20 AM CDT   PHYSICAL with Arsenio Smith MD   [facility]    [address] [place] [address] [id number] [phone]            Oct 31, 2018  9:45 AM CDT   MA SCREENING DIGITAL BILATERAL with OXMA1   [facility]    [address] [place] [id number] [phone]           Do not use any powder, lotion or deodorant under your arms or on your breast. If you do, we will ask you to remove it before your exam.  Wear comfortable, two-piece clothing.  If you have any allergies, tell your care team.  Bring any previous mammograms from other facilities or have them mailed to the breast center. Three-dimensional (3D) mammograms are available at [facility] locations in [facility], [facility], [facility], and [place]. Health locations include [place] [facility] in [place]. Benefits of 3D mammograms include: - Improved rate of cancer detection - Decreases your chance of having to go back for more tests, which means fewer: - \"False-positive\" results (This means that there is an abnormal area but it isn't cancer.) - Invasive testing procedures, such " as a biopsy or surgery - Can provide clearer images of the breast if you have dense breast tissue. 3D mammography is an optional exam that anyone can have with a 2D mammogram. It doesn't replace or take the place of a 2D mammogram. 2D mammograms remain an effective screening test for all women.  Not all insurance companies cover the cost of a 3D mammogram. Check with your insurance.            Nov 23, 2018 10:00 AM CST   Office Visit with Kraig Arriaza MD   [facility]    [address] [id number] [phone]           Bring a current list of meds and any records pertaining to this visit. For Physicals, please bring immunization records and any forms needing to be filled out. Please arrive 10 minutes early to complete paperwork.              Future tests that were ordered for you today     Open Future Orders        Priority Expected Expires Ordered    Hepatic panel Routine 9/1/2018 9/30/2018 8/23/2018    Hepatic panel Routine 11/1/2018 11/30/2018 8/23/2018    Lipid Profile Routine 11/1/2018 11/30/2018 8/23/2018            Who to contact     If you have questions or need follow up information about today's clinic visit or your schedule please contact [facility] directly at [phone].  Normal or non-critical lab and imaging results will be communicated to you by Arcarioshart, letter or phone within 4 business days after the clinic has received the results. If you do not hear from us within 7 days, please contact the clinic through Arcarioshart or phone. If you have a critical or abnormal lab result, we will notify you by phone as soon as possible.  Submit refill requests through Pole Star or call your pharmacy and they will forward the refill request to us. Please allow 3 business days for your refill to be completed.          Additional Information About Your Visit        Pole Star Information     Pole Star gives you  secure access to your electronic health record. If you see a primary care provider, you can also send messages to your care team and make appointments. If you have questions, please call your primary care clinic.  If you do not have a primary care provider, please call [phone] and they will assist you.        Care EveryWhere ID     This is your Care EveryWhere ID. This could be used by other organizations to access your [place] medical records  [id number]        Your Vitals Were     Pulse Temperature Respirations Last Period Pulse Oximetry BMI (Body Mass Index)    72 97.7  F (36.5  C) (Oral) 14 08/02/2018 (Approximate) 97% 24.07 kg/m2       Blood Pressure from Last 3 Encounters:   08/23/18 130/82   08/09/18 126/78   04/23/18 132/88    Weight from Last 3 Encounters:   08/23/18 131 lb 9.6 oz (59.7 kg)   08/09/18 129 lb 12.8 oz (58.9 kg)   04/23/18 129 lb 9.6 oz (58.8 kg)                 Today's Medication Changes          These changes are accurate as of 8/23/18  9:36 AM.  If you have any questions, ask your nurse or doctor.               Start taking these medicines.        Dose/Directions    atorvastatin 10 MG tablet   Commonly known as:  LIPITOR   Used for:  Hyperlipidemia with target LDL less than 130   Started by:  Arsenio Smith MD        Dose:  10 mg   Take 1 tablet (10 mg) by mouth daily   Quantity:  90 tablet   Refills:  3         Stop taking these medicines if you haven't already. Please contact your care team if you have questions.     STATIN NOT PRESCRIBED (INTENTIONAL)   Stopped by:  Arsenio Smith MD                Where to get your medicines      [facility] [place], [place] - [address] AT [address] to [facility]  [address], [facility] [address]     Phone:  [phone]     atorvastatin 10 MG tablet                Primary Care Provider Office Phone # Fax #    Arsenio Smith [phone] [address] [facility] [address]        Equal Access to Services     ARNALDOSEAN KENISHA : Hadii aad ku hadthu Sobijuali, waevelynda luqadaha, qaybta kacarolbarak gonzalezamritbarak, aaron khanpatriciamy osuna. So [facility] [phone].    ATENCIÓN: Si habla español, tiene a álvarez disposición servicios gratuitos de asistencia lingüística. Llame al [phone].    We comply with applicable federal civil rights laws and [place] laws. We do not discriminate on the basis of race, color, national origin, age, disability, sex, sexual orientation, or gender identity.            Thank you!     Thank you for choosing [facility]  for your care. Our goal is always to provide you with excellent care. Hearing back from our patients is one way we can continue to improve our services. Please take a few minutes to complete the written survey that you may receive in the mail after your visit with us. Thank you!             Your Updated Medication List - Protect others around you: Learn how to safely use, store and throw away your medicines at www.disposemymeds.org.          This list is accurate as of 8/23/18  9:36 AM.  Always use your most recent med list.                   Brand Name Dispense Instructions for use Diagnosis    ACE/ARB/ARNI NOT PRESCRIBED (INTENTIONAL)      Please choose reason not prescribed, below    Type 2 diabetes mellitus without complication, without long-term current use of insulin (H)       atorvastatin 10 MG tablet    LIPITOR    90 tablet    Take 1 tablet (10 mg) by mouth daily    Hyperlipidemia with target LDL less than 130       blood glucose monitoring meter device kit     1 kit    Use to test blood sugars 3 times daily or as directed with appropriate meter test strips and lancets and ETOH swabs, # 1 month, refill times 11    Type 2 diabetes mellitus without complication, without long-term current use of insulin (H)       CINNAMON PO      Take  by mouth.        glimepiride 1 MG tablet     AMARYL    90 tablet    Take 1 tablet (1 mg) by mouth every morning (before breakfast)    Type 2 diabetes mellitus without complication, without long-term current use of insulin (H)       metFORMIN 500 MG tablet    GLUCOPHAGE    180 tablet    Take 1 tablet (500 mg) by mouth 2 times daily (with meals)    Type 2 diabetes mellitus without complication, without long-term current use of insulin (H)       multivitamin per tablet     100 Tab    ONE DAILY    Paresthesia

## 2018-08-23 NOTE — LETTER
[facility]  [address] [address]  [phone]      8/23/2018       Theresa Quarles  [address]  [facility] [address]        Dear Theresa,    I would just writing this letter to follow-up with you on the cholesterol medicine, Lipitor, that we started.  Please see some general information below and regarding this class of medications:    This type of drug is usually highly effective to lower LDL cholesterol and is usually very well tolerated. However, statin-type drugs can potentially injure the liver. Blood tests will be required  In 1 month and 3 months as we discussed for reassurance.  Damage to the liver, if detected early, can be reversed by stopping the drug.  Be alert for persistent nausea, abdominal pain, or yellow jaundice, and report such to me directly. Statin drugs may also cause skeletal muscle injury in rare cases. Be alert for pronounced persistent diffuse muscle pain and discontinue the drug immediately should such symptoms develop. Grapefruit juice may increase the blood levels and side effects of some HMG Co-A reductase inhibitors (Zocor, Lipitor and Mevacor but not Pravachol or Lescol).       Sincerely,      Arsenio Smith MD  Internal Medicine

## 2018-08-23 NOTE — PROGRESS NOTES
SUBJECTIVE:   Theresa Quarles is a 48 year old female who presents to clinic today for the following health issues:    Follow up liver function results from 18      Problem list and histories reviewed & adjusted, as indicated.  Additional history: as documented    Patient Active Problem List   Diagnosis     Gestational diabetes mellitus, antepartum     Liver function test abnormality     Hyperlipidemia with target LDL less than 130     History of gestational diabetes mellitus, not currently pregnant     Butler's metatarsalgia, neuralgia, or neuroma     Foot joint pain     Abnormality of gait     Other postprocedural status(V45.89)     Other peripheral enthesopathies     Fatty liver     Pyelonephritis     General counseling for prescription of oral contraceptives     Type 2 diabetes mellitus without complication, without long-term current use of insulin (H)     Past Surgical History:   Procedure Laterality Date     BIOPSY BREAST  2012    needle bx benign     HC EXCISE HAND/FOOT NEUROMA       HC REMOVAL OF TONSILS,14+ Y/O      Tonsils 14+y.o.       Social History   Substance Use Topics     Smoking status: Never Smoker     Smokeless tobacco: Never Used     Alcohol use No     Family History   Problem Relation Age of Onset     Cerebrovascular Disease Father           Thyroid Disease Mother      s/p thyrodectomy     Cancer - colorectal Mother      rectal cancer         Current Outpatient Prescriptions   Medication Sig Dispense Refill     ACE/ARB/ARNI NOT PRESCRIBED, INTENTIONAL, Please choose reason not prescribed, below       blood glucose monitoring (ACCU-CHEK BECKY PLUS) meter device kit Use to test blood sugars 3 times daily or as directed with appropriate meter test strips and lancets and ETOH swabs, # 1 month, refill times 11 1 kit 5     CINNAMON PO Take  by mouth.       glimepiride (AMARYL) 1 MG tablet Take 1 tablet (1 mg) by mouth every morning (before breakfast) 90 tablet 3     metFORMIN  (GLUCOPHAGE) 500 MG tablet Take 1 tablet (500 mg) by mouth 2 times daily (with meals) 180 tablet 3     MULTIVITAMINS PO TABS ONE DAILY 100 Tab 3     STATIN NOT PRESCRIBED, INTENTIONAL, Statin not prescribed intentionally due to Active liver disease (fatty liver) 0 each 0     Allergies   Allergen Reactions     Lisinopril Hives     Noted angioedema     BP Readings from Last 3 Encounters:   08/09/18 126/78   04/23/18 132/88   03/27/18 134/88    Wt Readings from Last 3 Encounters:   08/09/18 129 lb 12.8 oz (58.9 kg)   04/23/18 129 lb 9.6 oz (58.8 kg)   03/27/18 127 lb 3.2 oz (57.7 kg)            Reviewed and updated as needed this visit by clinical staff       Reviewed and updated as needed this visit by Provider         ROS:  CONSTITUTIONAL: NEGATIVE for fever, chills, change in weight  ENT/MOUTH: NEGATIVE for ear, mouth and throat problems  RESP: NEGATIVE for significant cough or SOB  CV: NEGATIVE for chest pain, palpitations or peripheral edema  GI: NEGATIVE for nausea, abdominal pain, heartburn, or change in bowel habits  : NEGATIVE for frequency, dysuria, or hematuria  MUSCULOSKELETAL: NEGATIVE for significant arthralgias or myalgia  HEME: NEGATIVE for bleeding problems  PSYCHIATRIC: NEGATIVE for changes in mood or affect    OBJECTIVE:                                                    /82  Pulse 72  Temp 97.7  F (36.5  C) (Oral)  Resp 14  Wt 131 lb 9.6 oz (59.7 kg)  LMP 08/02/2018 (Approximate)  SpO2 97%  BMI 24.07 kg/m2  There is no height or weight on file to calculate BMI.  GENERAL: alert and no distress  EYES: Eyes grossly normal to inspection, extraocular movements - intact, and PERRL  HENT: ear canals- normal; TMs- normal; Nose- normal; Mouth- no ulcers, no lesions  NECK: no tenderness, no adenopathy, no asymmetry, no masses, no stiffness; thyroid- normal to palpation  RESP: lungs clear to auscultation - no rales, no rhonchi, no wheezes  CV: regular rates and rhythm, normal S1 S2, no S3 or S4  and no click or rub  MS: extremities- no gross deformities noted  PSYCH: Alert and oriented times 3; speech- coherent , normal rate and volume; able to articulate logical thoughts, able to abstract reason, no tangential thoughts, no hallucinations or delusions, affect- normal     ASSESSMENT/PLAN:                                                      (E11.9) Type 2 diabetes mellitus without complication, without long-term current use of insulin (H)  (primary encounter diagnosis)  Comment: Blood sugars appear to be fairly well-controlled at the present time so we will continue with medical management.  Plan: I discussed the use of ACE inhibitors and ARB's in the patient with known history of diabetes but the patient developed mild angioedema with lisinopril therefore we will avoid this accordingly per    (K76.0) Fatty liver  Comment: Noted on ultrasound dated 4/5/2013 likely source for mildly elevated liver function profile.  Plan: Noted now immunized against hepatitis a and B    (E78.5) Hyperlipidemia with target LDL less than 130  Comment: Discussed slightly elevated liver function profile is consistent with mild fatty liver and in setting of diabetes and elevated cholesterol feel would benefit from statin trial.  Plan: atorvastatin (LIPITOR) 10 MG tablet, Hepatic         panel, Hepatic panel, Lipid Profile        Discussed the risks and benefits of using statins with the patient and she is agreeable for a trial.  I have suggested that she make sure that she follows up with me in 1 month to repeat her liver function profile in 3 months for repeat lipid and liver function profile again.  Orders have been placed.      See Patient Instructions    Arsenio Smith MD  [facility]    25 minutes spent with this patient, face to face, discussing treatment options for listed problems above as well as side effects of appropriate medications.  Counseling time extended beyond 50% of the clinic visit.   Medication dosing, treatment plan and follow-up were discussed. Also reviewed need for primary care testing for patient.

## 2018-09-24 DIAGNOSIS — E11.9 TYPE 2 DIABETES MELLITUS WITHOUT COMPLICATION, WITHOUT LONG-TERM CURRENT USE OF INSULIN (H): ICD-10-CM

## 2018-09-24 DIAGNOSIS — E78.5 HYPERLIPIDEMIA WITH TARGET LDL LESS THAN 130: ICD-10-CM

## 2018-09-24 LAB
ALBUMIN SERPL-MCNC: 3.9 G/DL (ref 3.4–5)
ALP SERPL-CCNC: 62 U/L (ref 40–150)
ALT SERPL W P-5'-P-CCNC: 59 U/L (ref 0–50)
AST SERPL W P-5'-P-CCNC: 42 U/L (ref 0–45)
BILIRUB DIRECT SERPL-MCNC: 0.2 MG/DL (ref 0–0.2)
BILIRUB SERPL-MCNC: 0.7 MG/DL (ref 0.2–1.3)
CHOLEST SERPL-MCNC: 133 MG/DL
CREAT UR-MCNC: 131 MG/DL
HBA1C MFR BLD: 6.6 % (ref 0–5.6)
HDLC SERPL-MCNC: 43 MG/DL
LDLC SERPL CALC-MCNC: 64 MG/DL
MICROALBUMIN UR-MCNC: 60 MG/L
MICROALBUMIN/CREAT UR: 46.11 MG/G CR (ref 0–25)
NONHDLC SERPL-MCNC: 90 MG/DL
PROT SERPL-MCNC: 7.6 G/DL (ref 6.8–8.8)
TRIGL SERPL-MCNC: 130 MG/DL

## 2018-09-24 PROCEDURE — 80061 LIPID PANEL: CPT | Performed by: INTERNAL MEDICINE

## 2018-09-24 PROCEDURE — 83036 HEMOGLOBIN GLYCOSYLATED A1C: CPT | Performed by: INTERNAL MEDICINE

## 2018-09-24 PROCEDURE — 36415 COLL VENOUS BLD VENIPUNCTURE: CPT | Performed by: INTERNAL MEDICINE

## 2018-09-24 PROCEDURE — 82043 UR ALBUMIN QUANTITATIVE: CPT | Performed by: INTERNAL MEDICINE

## 2018-09-24 PROCEDURE — 80076 HEPATIC FUNCTION PANEL: CPT | Performed by: INTERNAL MEDICINE

## 2018-10-29 ENCOUNTER — RADIANT APPOINTMENT (OUTPATIENT)
Dept: MAMMOGRAPHY | Facility: CLINIC | Age: 48
End: 2018-10-29
Attending: OBSTETRICS & GYNECOLOGY
Payer: COMMERCIAL

## 2018-10-29 ENCOUNTER — OFFICE VISIT (OUTPATIENT)
Dept: INTERNAL MEDICINE | Facility: CLINIC | Age: 48
End: 2018-10-29
Payer: COMMERCIAL

## 2018-10-29 VITALS
RESPIRATION RATE: 14 BRPM | WEIGHT: 132.2 LBS | OXYGEN SATURATION: 98 % | SYSTOLIC BLOOD PRESSURE: 126 MMHG | BODY MASS INDEX: 24.33 KG/M2 | DIASTOLIC BLOOD PRESSURE: 80 MMHG | TEMPERATURE: 97.8 F | HEART RATE: 81 BPM | HEIGHT: 62 IN

## 2018-10-29 DIAGNOSIS — E11.9 TYPE 2 DIABETES MELLITUS WITHOUT COMPLICATION, WITHOUT LONG-TERM CURRENT USE OF INSULIN (H): ICD-10-CM

## 2018-10-29 DIAGNOSIS — Z23 NEED FOR PROPHYLACTIC VACCINATION AND INOCULATION AGAINST INFLUENZA: ICD-10-CM

## 2018-10-29 DIAGNOSIS — Z00.00 ENCOUNTER FOR ROUTINE ADULT HEALTH EXAMINATION WITHOUT ABNORMAL FINDINGS: Primary | ICD-10-CM

## 2018-10-29 DIAGNOSIS — Z12.31 VISIT FOR SCREENING MAMMOGRAM: ICD-10-CM

## 2018-10-29 DIAGNOSIS — E78.5 HYPERLIPIDEMIA WITH TARGET LDL LESS THAN 130: ICD-10-CM

## 2018-10-29 LAB — HGB BLD-MCNC: 12.5 G/DL (ref 11.7–15.7)

## 2018-10-29 PROCEDURE — 90682 RIV4 VACC RECOMBINANT DNA IM: CPT | Performed by: INTERNAL MEDICINE

## 2018-10-29 PROCEDURE — 90471 IMMUNIZATION ADMIN: CPT | Performed by: INTERNAL MEDICINE

## 2018-10-29 PROCEDURE — 36415 COLL VENOUS BLD VENIPUNCTURE: CPT | Performed by: INTERNAL MEDICINE

## 2018-10-29 PROCEDURE — 85018 HEMOGLOBIN: CPT | Performed by: INTERNAL MEDICINE

## 2018-10-29 PROCEDURE — 77067 SCR MAMMO BI INCL CAD: CPT | Mod: TC

## 2018-10-29 PROCEDURE — 99396 PREV VISIT EST AGE 40-64: CPT | Mod: 25 | Performed by: INTERNAL MEDICINE

## 2018-10-29 NOTE — MR AVS SNAPSHOT
After Visit Summary   10/29/2018    Theresa Quarles    MRN: [id number]           Patient Information     Date Of Birth          1970        Visit Information        Provider Department      10/29/2018 9:20 AM Arsenio Smith MD [facility]        Today's Diagnoses     Encounter for routine adult health examination without abnormal findings    -  1    Type 2 diabetes mellitus without complication, without long-term current use of insulin (H)        Hyperlipidemia with target LDL less than 130        Visit for screening mammogram        Need for prophylactic vaccination and inoculation against influenza          Care Instructions      Preventive Health Recommendations  Female Ages 40 to 49    Yearly exam:     See your health care provider every year in order to  1. Review health changes.   2. Discuss preventive care.    3. Review your medicines if your doctor prescribed any.      Get a Pap test every three years (unless you have an abnormal result and your provider advises testing more often).      If you get Pap tests with HPV test, you only need to test every 5 years, unless you have an abnormal result. You do not need a Pap test if your uterus was removed (hysterectomy) and you have not had cancer.      You should be tested each year for STDs (sexually transmitted diseases), if you're at risk.     Ask your doctor if you should have a mammogram.      Have a colonoscopy (test for colon cancer) if someone in your family has had colon cancer or polyps before age 50.       Have a cholesterol test every 5 years.       Have a diabetes test (fasting glucose) after age 45. If you are at risk for diabetes, you should have this test every 3 years.    Shots: Get a flu shot each year. Get a tetanus shot every 10 years.     Nutrition:     Eat at least 5 servings of fruits and vegetables each day.    Eat whole-grain bread, whole-wheat pasta and brown rice instead of white grains and  rice.    Get adequate Calcium and Vitamin D.      Lifestyle    Exercise at least 150 minutes a week (an average of 30 minutes a day, 5 days a week). This will help you control your weight and prevent disease.    Limit alcohol to one drink per day.    No smoking.     Wear sunscreen to prevent skin cancer.    See your dentist every six months for an exam and cleaning.          Follow-ups after your visit        Follow-up notes from your care team     Return for diabetes check 6 months, routine mammogram, screening.      Your next 10 appointments already scheduled     Oct 31, 2018  9:45 AM CDT   MA SCREENING DIGITAL BILATERAL with [facility]   [facility] ([facility]    [address] [address] [phone]           How do I prepare for my exam? (Food and drink instructions) No Food and Drink Restrictions.  How do I prepare for my exam? (Other instructions) Do not use any powder, lotion or deodorant under your arms or on your breast. If you do, we will ask you to remove it before your exam.  What should I wear: Wear comfortable, two-piece clothing.  How long does the exam take: Most scans will take 15 minutes.  What should I bring: Bring any previous mammograms from other facilities or have them mailed to the breast center.  Do I need a :  No  is needed.  What do I need to tell my doctor: If you have any allergies, tell your care team.  What should I do after the exam: No restrictions, You may resume normal activities.  What is this test: This test is an x-ray of the breast to look for breast disease. The breast is pressed between two plates to flatten and spread the tissue. An X-ray is taken of the breast from different angles.  Who should I call with questions: If you have any questions, please call the Imaging Department where you will have your exam. Directions, parking instructions, and other information is available on our website,  [email or web address].  Other information about my exam Three-dimensional (3D) mammograms are available at [facility] locations in [facility], [facility], [place], [facility] and [place]. [facility] locations include [place] and the [facility] in [place].  Benefits of 3D mammograms include * Improved rate of cancer detection * Decreases your chance of having to go back for more tests, which means fewer: * \"False-positive\" results (This means that there is an abnormal area but it isn't cancer.) * Invasive testing procedures, such as a biopsy or surgery * Can provide clearer images of the breast if you have dense breast tissue.  *3D mammography is an optional exam that anyone can have with a 2D mammogram. It doesn't replace or take the place of a 2D mammogram. 2D mammograms remain an effective screening test for all women.  Not all insurance companies cover the cost of a 3D mammogram. Check with your insurance. Three-dimensional (3D) mammograms are available at [facility] locations in [facility], [facility], [facility], and [place]. Health locations include [place] and [facility] in [place]. Benefits of 3D mammograms include: - Improved rate of cancer detection - Decreases your chance of having to go back for more tests, which means fewer: - \"False-positive\" results (This means that there is an abnormal area but it isn't cancer.) - Invasive testing procedures, such as a biopsy or surgery - Can provide clearer images of the breast if you have dense breast tissue. 3D mammography is an optional exam that anyone can have with a 2D mammogram. It doesn't replace or take the place of a 2D mammogram. 2D mammograms remain an effective screening test for all women.  Not all insurance companies cover the cost of a 3D mammogram. Check with your insurance.            Nov 23, 2018  9:30 AM CST   LAB with [facility] "   [facility] ([facility])    [address] [id number] [phone]           Please do not eat 10-12 hours before your appointment if you are coming in fasting for labs on lipids, cholesterol, or glucose (sugar). This does not apply to pregnant women. Water, hot tea and black coffee (with nothing added) are okay. Do not drink other fluids, diet soda or chew gum.            Nov 23, 2018 10:00 AM CST   Office Visit with Kraig Arriaza MD   [facility] ([facility])    [address] [id number] [phone]           Bring a current list of meds and any records pertaining to this visit. For Physicals, please bring immunization records and any forms needing to be filled out. Please arrive 10 minutes early to complete paperwork.              Future tests that were ordered for you today     Open Future Orders        Priority Expected Expires Ordered    *MA Screening Digital Bilateral Routine  10/29/2019 10/29/2018            Who to contact     If you have questions or need follow up information about today's clinic visit or your schedule please contact [facility] directly at [phone].  Normal or non-critical lab and imaging results will be communicated to you by Mobile [employer], letter or phone within 4 business days after the clinic has received the results. If you do not hear from us within 7 days, please contact the clinic through [employer] or phone. If you have a critical or abnormal lab result, we will notify you by phone as soon as possible.  Submit refill requests through [employer] or call your pharmacy and they will forward the refill request to us. Please allow 3 business days for your refill to be completed.          Additional Information About Your Visit        [employer] Information     [employer] gives you secure access to your electronic health record.  "If you see a primary care provider, you can also send messages to your care team and make appointments. If you have questions, please call your primary care clinic.  If you do not have a primary care provider, please call [phone] and they will assist you.        Care EveryWhere ID     This is your Care EveryWhere ID. This could be used by other organizations to access your [place] [facility] medical records  [id number]        Your Vitals Were     Pulse Temperature Respirations Height Last Period Pulse Oximetry    81 97.8  F (36.6  C) (Oral) 14 5' 2\" (1.575 m) 10/10/2018 (Exact Date) 98%    Breastfeeding? BMI (Body Mass Index)                No 24.18 kg/m2           Blood Pressure from Last 3 Encounters:   10/29/18 126/80   08/23/18 130/82   08/09/18 126/78    Weight from Last 3 Encounters:   10/29/18 132 lb 3.2 oz (60 kg)   08/23/18 131 lb 9.6 oz (59.7 kg)   08/09/18 129 lb 12.8 oz (58.9 kg)              We Performed the Following     FLU VACCINE, (RIV4) RECOMBINANT HA  , IM (FluBlok, egg free) [29961]- >18 YRS (G recommended  50-64 YRS)     Hemoglobin     Vaccine Administration, Initial [10589]        Primary Care Provider Office Phone # Fax #    Arsenio Smith [phone]       [address] [facility] [address]        Equal Access to Services     GUERA DE AH: Hadii aad ku hadasho Soomaali, waaxda luqadaha, qaybta kaalmada sapphire, aaron osuna. So [facility] [phone].    ATENCIÓN: Si habla wilañol, tiene a álvarez disposición servicios gratuitos de asistencia lingüística. Llame al [phone].    We comply with applicable federal civil rights laws and [place] laws. We do not discriminate on the basis of race, color, national origin, age, disability, sex, sexual orientation, or gender identity.            Thank you!     Thank you for choosing [facility]  for your care. Our goal is always to provide you with excellent care. Hearing back " from our patients is one way we can continue to improve our services. Please take a few minutes to complete the written survey that you may receive in the mail after your visit with us. Thank you!             Your Updated Medication List - Protect others around you: Learn how to safely use, store and throw away your medicines at www.disposemymeds.org.          This list is accurate as of 10/29/18  9:56 AM.  Always use your most recent med list.                   Brand Name Dispense Instructions for use Diagnosis    ACE/ARB/ARNI NOT PRESCRIBED (INTENTIONAL)      Please choose reason not prescribed, below    Type 2 diabetes mellitus without complication, without long-term current use of insulin (H)       atorvastatin 10 MG tablet    LIPITOR    90 tablet    Take 1 tablet (10 mg) by mouth daily    Hyperlipidemia with target LDL less than 130       blood glucose monitoring meter device kit     1 kit    Use to test blood sugars 3 times daily or as directed with appropriate meter test strips and lancets and ETOH swabs, # 1 month, refill times 11    Type 2 diabetes mellitus without complication, without long-term current use of insulin (H)       CINNAMON PO      Take  by mouth.        glimepiride 1 MG tablet    AMARYL    90 tablet    Take 1 tablet (1 mg) by mouth every morning (before breakfast)    Type 2 diabetes mellitus without complication, without long-term current use of insulin (H)       metFORMIN 500 MG tablet    GLUCOPHAGE    180 tablet    Take 1 tablet (500 mg) by mouth 2 times daily (with meals)    Type 2 diabetes mellitus without complication, without long-term current use of insulin (H)       multivitamin per tablet     100 Tab    ONE DAILY    Paresthesia

## 2018-10-29 NOTE — LETTER
[facility]  [address] [phone]      10/29/2018       Theresa Quarles  [address] [place] [id number]        Dear Theresa,    Your hemoglobin is normal.      Sincerely,      Arsenio Smith MD  Internal Medicine

## 2018-10-29 NOTE — PROGRESS NOTES
SUBJECTIVE:   CC: Theresa Quarles is an 48 year old woman who presents for preventive health visit.     Answers for HPI/ROS submitted by the patient on 10/29/2018   Annual Exam:  Getting at least 3 servings of Calcium per day:: Yes  Bi-annual eye exam:: Yes  Dental care twice a year:: Yes  Sleep apnea or symptoms of sleep apnea:: None  Diet:: Diabetic  Frequency of exercise:: 4-5 days/week  Taking medications regularly:: Yes  Medication side effects:: None  Additional concerns today:: No  PHQ-2 Score: 0  Duration of exercise:: N/A      Today's PHQ-2 Score:   PHQ-2 ( 1999 Pf[employer]) 8/23/2018 8/9/2018   Q1: Little interest or pleasure in doing things 0 0   Q2: Feeling down, depressed or hopeless 0 0   PHQ-2 Score 0 0   Q1: Little interest or pleasure in doing things - -   Q2: Feeling down, depressed or hopeless - -   PHQ-2 Score - -       Abuse: Current or Past(Physical, Sexual or Emotional)- No  Do you feel safe in your environment - Yes    Social History   Substance Use Topics     Smoking status: Never Smoker     Smokeless tobacco: Never Used     Alcohol use No     If you drink alcohol do you typically have >3 drinks per day or >7 drinks per week? No                     Reviewed orders with patient.  Reviewed health maintenance and updated orders accordingly - Yes      Patient under age 50, mutual decision reflected in health maintenance.      Pertinent mammograms are reviewed under the imaging tab.  History of abnormal Pap smear: NO - age 30-65 PAP every 5 years with negative HPV co-testing recommended  PAP / HPV Latest Ref Rng & Units 10/24/2016 9/26/2013 8/30/2012   PAP - NIL NIL NIL   HPV 16 DNA NEG Negative - -   HPV 18 DNA NEG Negative - -   OTHER HR HPV NEG Negative - -     Reviewed and updated as needed this visit by clinical staff       Reviewed and updated as needed this visit by Provider        ROS:  CONSTITUTIONAL: NEGATIVE for fever, chills, change in weight  INTEGUMENTARU/SKIN: NEGATIVE for worrisome  "rashes, moles or lesions  EYES: NEGATIVE for vision changes or irritation  ENT: NEGATIVE for ear, mouth and throat problems  RESP: NEGATIVE for significant cough or SOB  BREAST: NEGATIVE for masses, tenderness or discharge  CV: NEGATIVE for chest pain, palpitations or peripheral edema  GI: NEGATIVE for nausea, abdominal pain, heartburn, or change in bowel habits  : NEGATIVE for unusual urinary or vaginal symptoms. Periods are regular.  MUSCULOSKELETAL: NEGATIVE for significant arthralgias or myalgia  NEURO: NEGATIVE for weakness, dizziness or paresthesias  PSYCHIATRIC: NEGATIVE for changes in mood or affect    OBJECTIVE:   /80  Pulse 81  Temp 97.8  F (36.6  C) (Oral)  Resp 14  Ht 5' 2\" (1.575 m)  Wt 132 lb 3.2 oz (60 kg)  LMP 10/10/2018 (Exact Date)  SpO2 98%  Breastfeeding? No  BMI 24.18 kg/m2  EXAM:  GENERAL: healthy, alert and no distress  EYES: Eyes grossly normal to inspection, PERRL and conjunctivae and sclerae normal  HENT: ear canals and TM's normal, nose and mouth without ulcers or lesions  NECK: no adenopathy, no asymmetry, masses, or scars and thyroid normal to palpation  RESP: lungs clear to auscultation - no rales, rhonchi or wheezes  BREAST: declined per patient  CV: regular rate and rhythm, normal S1 S2, no S3 or S4, no murmur, click or rub, no peripheral edema and peripheral pulses strong  ABDOMEN: soft, nontender, no hepatosplenomegaly, no masses and bowel sounds normal  MS: no gross musculoskeletal defects noted, no edema  NEURO: Normal strength and tone, mentation intact and speech normal  PSYCH: mentation appears normal, affect normal/bright    ASSESSMENT/PLAN:   1. Encounter for routine adult health examination without abnormal findings  Advised a flu vaccination  - Hemoglobin    2. Type 2 diabetes mellitus without complication, without long-term current use of insulin (H)  Stable on therapy continue with medical management and recheck lab work March 2019    Lab Results " "  Component Value Date    A1C 6.6 09/24/2018    A1C 7.1 08/09/2018    A1C 6.9 10/26/2017    A1C 6.6 04/10/2017    A1C 8.3 10/24/2016       3. Hyperlipidemia with target LDL less than 130  At goal on therapy now tolerating meds continue with therapy.    LDL Cholesterol Calculated   Date Value Ref Range Status   09/24/2018 66 <100 mg/dL Final     Comment:     Desirable:       <100 mg/dl       4. Visit for screening mammogram  Advise routine screening per age  - *MA Screening Digital Bilateral; Future    5. Need for prophylactic vaccination and inoculation against influenza  Recommended update annual vaccination  - FLU VACCINE, (RIV4) RECOMBINANT HA  , IM (FluBlok, egg free) [56152]- >18 YRS (FM recommended  50-64 YRS)  - Vaccine Administration, Initial [41783]    COUNSELING:   Reviewed preventive health counseling, as reflected in patient instructions       Regular exercise       Healthy diet/nutrition    BP Readings from Last 1 Encounters:   08/23/18 130/82     Estimated body mass index is 24.07 kg/(m^2) as calculated from the following:    Height as of 8/9/18: 5' 2\" (1.575 m).    Weight as of 8/23/18: 131 lb 9.6 oz (59.7 kg).           reports that she has never smoked. She has never used smokeless tobacco.      Counseling Resources:  ATP IV Guidelines  Pooled Cohorts Equation Calculator  Breast Cancer Risk Calculator  FRAX Risk Assessment  ICSI Preventive Guidelines  Dietary Guidelines for Americans, 2010  [employer]'s MyPlate  ASA Prophylaxis  Lung CA Screening    Arsenio Smith MD  [facility]    Injectable Influenza Immunization Documentation    1.  Is the person to be vaccinated sick today?   No    2. Does the person to be vaccinated have an allergy to a component   of the vaccine?   No  Egg Allergy Algorithm Link    3. Has the person to be vaccinated ever had a serious reaction   to influenza vaccine in the past?   No    4. Has the person to be vaccinated ever had Guillain-Barré syndrome?  "  No    Form completed by Ewa Gresham CMA

## 2018-11-23 ENCOUNTER — OFFICE VISIT (OUTPATIENT)
Dept: OBGYN | Facility: CLINIC | Age: 48
End: 2018-11-23
Payer: COMMERCIAL

## 2018-11-23 ENCOUNTER — RESULT FOLLOW UP (OUTPATIENT)
Dept: OBGYN | Facility: CLINIC | Age: 48
End: 2018-11-23

## 2018-11-23 VITALS
DIASTOLIC BLOOD PRESSURE: 72 MMHG | WEIGHT: 132 LBS | HEIGHT: 62 IN | SYSTOLIC BLOOD PRESSURE: 122 MMHG | BODY MASS INDEX: 24.29 KG/M2

## 2018-11-23 DIAGNOSIS — Z12.11 SPECIAL SCREENING FOR MALIGNANT NEOPLASMS, COLON: Primary | ICD-10-CM

## 2018-11-23 DIAGNOSIS — Z80.0 FAMILY HISTORY OF COLON CANCER: ICD-10-CM

## 2018-11-23 DIAGNOSIS — Z01.411 ENCOUNTER FOR GYNECOLOGICAL EXAMINATION WITH ABNORMAL FINDING: ICD-10-CM

## 2018-11-23 DIAGNOSIS — N93.9 ABNORMAL UTERINE BLEEDING (AUB): ICD-10-CM

## 2018-11-23 PROCEDURE — 87624 HPV HI-RISK TYP POOLED RSLT: CPT | Performed by: OBSTETRICS & GYNECOLOGY

## 2018-11-23 PROCEDURE — G0145 SCR C/V CYTO,THINLAYER,RESCR: HCPCS | Performed by: OBSTETRICS & GYNECOLOGY

## 2018-11-23 PROCEDURE — 99396 PREV VISIT EST AGE 40-64: CPT | Performed by: OBSTETRICS & GYNECOLOGY

## 2018-11-23 NOTE — NURSING NOTE
"Chief Complaint   Patient presents with     Gyn Exam       Initial /72 (BP Location: Left arm, Patient Position: Sitting, Cuff Size: Adult Regular)  Ht 5' 2\" (1.575 m)  Wt 132 lb (59.9 kg)  LMP 2018 (Exact Date)  Breastfeeding? No  BMI 24.14 kg/m2 Estimated body mass index is 24.14 kg/(m^2) as calculated from the following:    Height as of this encounter: 5' 2\" (1.575 m).    Weight as of this encounter: 132 lb (59.9 kg).  BP completed using cuff size: regular    Questioned patient about current smoking habits.  Pt. has never smoked.          The following HM Due: NONE    Fede Mckeon CMA                "

## 2018-11-23 NOTE — PROGRESS NOTES
HPI:  Theresa Quarles is a 48 year old s.e.  female  ,natural family planning for contraception who presents for an annual exam and pap.  She is experiencing some irregular vaginal bleeding.  The patient stopped OCPs in 2018.  The patient states that her true age is 51 and not 48, and age that was assigned to her when she came to this country.  The patient states that she gets menses every 1-1/2-2 months and flow can be very light up to her heavy flow of 6 days.  I reviewed her recent hemoglobin A1c of 6.6 and her recent normal lipid panel as well.  Patient states that she used to have bad dysmenorrhea but since childbirth this is resolved.  To control her type 2 diabetes she uses oral hypoglycemics as well as herbal supplements.  We also discussed her recent consultation with oncology and dermatology regarding a lesion on her lower chin.  The lesion lesion is benign and is not represent sweets syndrome.  Patient has an eye exam next week and denies foot ulceration.  An order was placed for colon cancer screening  Self breast exam,  ACS screening mammogram recs, the use of 81 mg ASA to decrease the risk of heart disease, lipid screening, colon cancer screening recs and Dexa scan recs thoroughly reveiwed.      Past Medical History:   Diagnosis Date     Fatty liver 2012     GBS (group B streptococcus) infection 2008     gestational diabetes      Gestational diabetes mellitus, antepartum 3/9/2009     Hyperlipidemia LDL goal < 130 2010     Liver function test abnormality 2010     Pregnancies, high-risk 3/19/2009     Past Surgical History:   Procedure Laterality Date     BIOPSY BREAST  2012    needle bx benign     HC EXCISE HAND/FOOT NEUROMA       HC REMOVAL OF TONSILS,12+ Y/O      Tonsils 14+y.o.     Family History   Problem Relation Age of Onset     Cerebrovascular Disease Father           Thyroid Disease Mother      s/p thyrodectomy     Cancer - colorectal Mother       "rectal cancer     Social History     Social History     Marital status:      Spouse name: N/A     Number of children: N/A     Years of education: N/A     Occupational History     Not on file.     Social History Main Topics     Smoking status: Never Smoker     Smokeless tobacco: Never Used     Alcohol use No     Drug use: No     Sexual activity: Yes     Partners: Male     Other Topics Concern     Not on file     Social History Narrative       Allergies:  Lisinopril    Current Outpatient Prescriptions   Medication Sig Dispense Refill     ACE/ARB/ARNI NOT PRESCRIBED, INTENTIONAL, Please choose reason not prescribed, below       atorvastatin (LIPITOR) 10 MG tablet Take 1 tablet (10 mg) by mouth daily 90 tablet 3     blood glucose monitoring (ACCU-CHEK BECKY PLUS) meter device kit Use to test blood sugars 3 times daily or as directed with appropriate meter test strips and lancets and ETOH swabs, # 1 month, refill times 11 1 kit 5     CINNAMON PO Take  by mouth.       glimepiride (AMARYL) 1 MG tablet Take 1 tablet (1 mg) by mouth every morning (before breakfast) 90 tablet 3     metFORMIN (GLUCOPHAGE) 500 MG tablet Take 1 tablet (500 mg) by mouth 2 times daily (with meals) 180 tablet 3     MULTIVITAMINS PO TABS ONE DAILY 100 Tab 3       ROS: ROS: 10 point ROS neg other than the symptoms noted above in the HPI.    EXAM:  Vitals: /72 (BP Location: Left arm, Patient Position: Sitting, Cuff Size: Adult Regular)  Ht 5' 2\" (1.575 m)  Wt 132 lb (59.9 kg)  LMP 11/07/2018 (Exact Date)  Breastfeeding? No  BMI 24.14 kg/m2  BMI= Body mass index is 24.14 kg/(m^2).  Constitutional: healthy, alert and no distress  Head: Normocephalic. No masses, lesions, tenderness or abnormalities  Neck: Neck supple. No adenopathy. Thyroid symmetric, normal size,, Carotids without bruits.  ENT: NEGATIVE for ear, mouth and throat problems  Breast:  breasts symmetric, no dominant or suspicious mass, no skin or nipple changes, no axillary " adenopathy, unchanged from previous exam or self exam in taught and encouraged  Cardiovascular: negative, PMI normal. No lifts, heaves, or thrills. RRR. No murmurs, clicks gallops or rub  Respiratory: negative, Percussion normal. Good diaphragmatic excursion. Lungs clear  Gastrointestinal: Abdomen soft, non-tender. BS normal. No masses, organomegaly  Genitourinary: Pelvic Exam:  External Genitalia:     Normal appearance for age, no discharge present, no tenderness present, no inflammatory lesions present, color normal  Vagina:     Normal vaginal vault without central or paravaginal defects, no discharge present, no inflammatory lesions present, no masses present  Bladder:     Nontender to palpation  Urethra:   Urethral Body:  Urethra palpation normal, urethra structural support normal   Urethral Meatus:  No erythema or lesions present  Cervix:     Appearance healthy, no lesions present, nontender to palpation, no bleeding present  Uterus:     Uterus: firm, normal sized and nontender, anteverted in position.   Adnexa:     No adnexal tenderness present, no adnexal masses present  Perineum:     Perineum within normal limits, no evidence of trauma, no rashes or skin lesions present  Anus:     Anus within normal limits, no hemorrhoids present  Inguinal Lymph Nodes:     No lymphadenopathy present  Pubic Hair:     Normal pubic hair distribution for age  Genitalia and Groin:     No rashes present, no lesions present, no areas of discoloration, no masses present    Musculoskeletalextremities normal- no gross deformities noted, gait normal and normal muscle tone  Integument: no suspicious lesions or rashes  Neurologic: Gait normal. Reflexes normal and symmetric. Sensation grossly WNL.  Psychiatric: mentation appears normal and affect normal/bright  Hematologic/Lymphatic/Immunologic: Normal cervical lymph nodes     ASSESSMENT:/PLAN:  (Z12.11) Special screening for malignant neoplasms, colon  (primary encounter  diagnosis)  Comment: Recommendations discussed  Plan: GASTROENTEROLOGY ADULT REF PROCEDURE ONLY        Referral given    (Z80.0) Family history of colon cancer  Comment: Patient's mother had colon cancer importance of screening discussed  Plan: Referral for colonoscopy given    (Z01.411) Encounter for gynecological examination with abnormal finding  Comment: Other than abnormal uterine bleeding exam otherwise is unremarkable  Plan: Pap imaged thin layer screen with HPV -         recommended age 30 - 65 years (select HPV order        below), HPV High Risk Types DNA Cervical        Repeat Pap and co-test done    (N93.9) Abnormal uterine bleeding (AUB)  Comment: We discussed the pathophysiology of abnormal uterine bleeding, the perimenopause and diminished ovarian reserve as well as structural and tissue abnormalities could cause the symptoms she is having  Plan: After this discussion we made a decision to proceed with a sonohysterogram and endometrial biopsy to rule out atypia.  I stressed the importance of using something for contraception until she is truly menopausal.  The patient does not want to be pregnant at this time.  Written plan was given      Kraig Arriaza M.D.

## 2018-11-23 NOTE — PATIENT INSTRUCTIONS
You can reach your [place] [facility] Care Team any time of the day by calling [phone]. This number will put you in touch with the 24 hour nurse line if the clinic is closed.    To contact your OB/GYN Surgery Scheduler please call [phone]. This is a direct number for your care team between 8 a.m. and 4 p.m. Monday through Friday.    [facility] is open for your convenience: [phone]  Monday through Friday 8 a.m. to 8:30 p.m.  Saturday 9 a.m. to 6 p.m.  Sunday Noon to 6 p.m.    They are closed on all major holidays.

## 2018-11-23 NOTE — MR AVS SNAPSHOT
After Visit Summary   11/23/2018    Theresa Quarles    MRN: [id number]           Patient Information     Date Of Birth          1970        Visit Information        Provider Department      11/23/2018 10:00 AM Kraig Arriaza MD [facility]        Today's Diagnoses     Special screening for malignant neoplasms, colon    -  1    Family history of colon cancer        Encounter for gynecological examination with abnormal finding        Abnormal uterine bleeding (AUB)          Care Instructions    You can reach your [facility] Care Team any time of the day by calling [phone]. This number will put you in touch with the 24 hour nurse line if the clinic is closed.    To contact your OB/GYN Surgery Scheduler please call [phone]. This is a direct number for your care team between 8 a.m. and 4 p.m. Monday through Friday.    [facility] is open for your convenience: [phone]  Monday through Friday 8 a.m. to 8:30 p.m.  Saturday 9 a.m. to 6 p.m.  Sunday Noon to 6 p.m.    They are closed on all major holidays.              Follow-ups after your visit        Additional Services     GASTROENTEROLOGY ADULT REF PROCEDURE ONLY       Last Lab Result: Creatinine (mg/dL)       Date                     Value                 08/09/2018               0.50 (L)         ----------  Body mass index is 24.14 kg/(m^2).     Needed:  No  Language:  English    Patient will be contacted to schedule procedure.     Please be aware that coverage of these services is subject to the terms and limitations of your health insurance plan.  Call member services at your health plan with any benefit or coverage questions.  Any procedures must be performed at a [facility] facility OR coordinated by your clinic's referral office.    Please bring the following with you to your appointment:    (1) Any X-Rays, CTs or MRIs which have been performed.  Contact the facility where they were done to arrange  for  prior to your scheduled appointment.    (2) List of current medications   (3) This referral request   (4) Any documents/labs given to you for this referral                  Your next 10 appointments already scheduled     Mar 23, 2019  9:30 AM CDT   LAB with [facility]   [facility]    [address] [address]   [phone]           Please do not eat 10-12 hours before your appointment if you are coming in fasting for labs on lipids, cholesterol, or glucose (sugar). This does not apply to pregnant women. Water, hot tea and black coffee (with nothing added) are okay. Do not drink other fluids, diet soda or chew gum.              Who to contact     If you have questions or need follow up information about today's clinic visit or your schedule please contact [facility][1] directly at [phone].  Normal or non-critical lab and imaging results will be communicated to you by [facility], letter or phone within 4 business days after the clinic has received the results. If you do not hear from us within 7 days, please contact the clinic through [employer] or phone. If you have a critical or abnormal lab result, we will notify you by phone as soon as possible.  Submit refill requests through [employer] or call your pharmacy and they will forward the refill request to us. Please allow 3 business days for your refill to be completed.          Additional Information About Your Visit        [facility] Information     [employer] gives you secure access to your electronic health record. If you see a primary care provider, you can also send messages to your care team and make appointments. If you have questions, please call your primary care clinic.  If you do not have a primary care provider, please call [phone] and they will assist you.        Care EveryWhere ID     This is your Care EveryWhere ID. This could be used by  "other organizations to access your [facility] medical records  [id number]        Your Vitals Were     Height Last Period Breastfeeding? BMI (Body Mass Index)          5' 2\" (1.575 m) 11/07/2018 (Exact Date) No 24.14 kg/m2         Blood Pressure from Last 3 Encounters:   11/23/18 122/72   10/29/18 126/80   08/23/18 130/82    Weight from Last 3 Encounters:   11/23/18 132 lb (59.9 kg)   10/29/18 132 lb 3.2 oz (60 kg)   08/23/18 131 lb 9.6 oz (59.7 kg)              We Performed the Following     GASTROENTEROLOGY ADULT REF PROCEDURE ONLY        Primary Care Provider Office Phone # Fax #    Arsenio Smith [phone]       [address] [place] [address]        Equal Access to Services     [facility]: Hadii aad ku hadasho Soomaali, waaxda luqadaha, qaybta kaalmada adeegyada, aaron ericksonin hayaan carlos solano . [facility] [phone].    ATENCIÓN: Si habla español, tiene a álvarez disposición servicios gratuitos de asistencia lingüística. Llame al [phone].    We comply with applicable federal civil rights laws and [place] laws. We do not discriminate on the basis of race, color, national origin, age, disability, sex, sexual orientation, or gender identity.            Thank you!     Thank you for choosing [facility]  for your care. Our goal is always to provide you with excellent care. Hearing back from our patients is one way we can continue to improve our services. Please take a few minutes to complete the written survey that you may receive in the mail after your visit with us. Thank you!             Your Updated Medication List - Protect others around you: Learn how to safely use, store and throw away your medicines at www.disposemymeds.org.          This list is accurate as of 11/23/18 10:49 AM.  Always use your most recent med list.                   Brand Name Dispense Instructions for use Diagnosis    ACE/ARB/ARNI NOT PRESCRIBED (INTENTIONAL)      Please " choose reason not prescribed, below    Type 2 diabetes mellitus without complication, without long-term current use of insulin (H)       atorvastatin 10 MG tablet    LIPITOR    90 tablet    Take 1 tablet (10 mg) by mouth daily    Hyperlipidemia with target LDL less than 130       blood glucose monitoring meter device kit     1 kit    Use to test blood sugars 3 times daily or as directed with appropriate meter test strips and lancets and ETOH swabs, # 1 month, refill times 11    Type 2 diabetes mellitus without complication, without long-term current use of insulin (H)       CINNAMON PO      Take  by mouth.        glimepiride 1 MG tablet    AMARYL    90 tablet    Take 1 tablet (1 mg) by mouth every morning (before breakfast)    Type 2 diabetes mellitus without complication, without long-term current use of insulin (H)       metFORMIN 500 MG tablet    GLUCOPHAGE    180 tablet    Take 1 tablet (500 mg) by mouth 2 times daily (with meals)    Type 2 diabetes mellitus without complication, without long-term current use of insulin (H)       multivitamin per tablet     100 Tab    ONE DAILY    Paresthesia

## 2018-11-27 LAB
COPATH REPORT: NORMAL
PAP: NORMAL

## 2018-11-29 LAB
FINAL DIAGNOSIS: ABNORMAL
HPV HR 12 DNA CVX QL NAA+PROBE: POSITIVE
HPV16 DNA SPEC QL NAA+PROBE: NEGATIVE
HPV18 DNA SPEC QL NAA+PROBE: NEGATIVE
SPECIMEN DESCRIPTION: ABNORMAL
SPECIMEN SOURCE CVX/VAG CYTO: ABNORMAL

## 2018-11-30 NOTE — PROGRESS NOTES
11/23/18 NIL pap, + HR HPV (not 16/18). Plan 1 year cotest  11/30/18 Pt notified   4/16/20 NIL pap, neg HR HPV. Plan 3 year cotest

## 2018-12-18 DIAGNOSIS — Z30.41 ENCOUNTER FOR SURVEILLANCE OF CONTRACEPTIVE PILLS: ICD-10-CM

## 2018-12-18 NOTE — TELEPHONE ENCOUNTER
"Requested Prescriptions   Pending Prescriptions Disp Refills     GIANVI 3-0.02 MG tablet [Pharmacy Med Name: GIANVI (28)  TAB 3/20] 84 tablet 3     Sig: TAKE 1 TABLET DAILY    Contraceptives Protocol Passed - 12/18/2018  5:58 AM       Passed - Patient is not a current smoker if age is 35 or older       Passed - Recent (12 mo) or future (30 days) visit within the authorizing provider's specialty    Patient had office visit in the last 12 months or has a visit in the next 30 days with authorizing provider or within the authorizing provider's specialty.  See \"Patient Info\" tab in inbasket, or \"Choose Columns\" in Meds & Orders section of the refill encounter.             Passed - No active pregnancy on record       Passed - No positive pregnancy test in past 12 months      Discontinued  Last Written Prescription Date:  11/17/17  Last Fill Quantity: 84,  # refills: 3   Last office visit: 10/29/2018 with prescribing provider:  11/23/18   Future Office Visit:   Next 5 appointments (look out 90 days)    Jan 17, 2019  2:45 PM CST  Office Visit with Kraig Arriaza MD  [facility] [address] [address] [phone]           "

## 2018-12-19 RX ORDER — DROSPIRENONE AND ETHINYL ESTRADIOL 0.02-3(28)
KIT ORAL
Qty: 84 TABLET | Refills: 2 | Status: SHIPPED | OUTPATIENT
Start: 2018-12-19 | End: 2020-04-16 | Stop reason: SINTOL

## 2019-01-01 ENCOUNTER — TRANSFERRED RECORDS (OUTPATIENT)
Dept: HEALTH INFORMATION MANAGEMENT | Facility: CLINIC | Age: 49
End: 2019-01-01

## 2019-01-03 DIAGNOSIS — E11.9 TYPE 2 DIABETES MELLITUS WITHOUT COMPLICATION, WITHOUT LONG-TERM CURRENT USE OF INSULIN (H): ICD-10-CM

## 2019-01-03 NOTE — TELEPHONE ENCOUNTER
Prescription approved per [facility] Protocol.    Sadia LOPEZ RN, BSN, PHN

## 2019-01-03 NOTE — TELEPHONE ENCOUNTER
"Requested Prescriptions   Pending Prescriptions Disp Refills     metFORMIN (GLUCOPHAGE) 500 MG tablet 180 tablet 3     Sig: Take 1 tablet (500 mg) by mouth 2 times daily (with meals)    Biguanide Agents Passed - 1/3/2019 11:36 AM       Passed - Blood pressure less than 140/90 in past 6 months    BP Readings from Last 3 Encounters:   11/23/18 122/72   10/29/18 126/80   08/23/18 130/82                Passed - Patient has documented LDL within the past 12 mos.    Recent Labs   Lab Test 09/24/18  0929   LDL 64            Passed - Patient has had a Microalbumin in the past 15 mos.    Recent Labs   Lab Test 09/24/18  1026   MICROL 60   UMALCR 46.11*            Passed - Patient is age 10 or older       Passed - Patient has documented A1c within the specified period of time.    If HgbA1C is 8 or greater, it needs to be on file within the past 3 months.  If less than 8, must be on file within the past 6 months.     Recent Labs   Lab Test 09/24/18  0929   A1C 6.6*            Passed - Patient's CR is NOT>1.4 OR Patient's EGFR is NOT<45 within past 12 mos.    Recent Labs   Lab Test 08/09/18  0943   GFRESTIMATED >90   GFRESTBLACK >90       Recent Labs   Lab Test 08/09/18  0943   CR 0.50*            Passed - Patient does NOT have a diagnosis of CHF.       Passed - Patient is not pregnant       Passed - Patient has not had a positive pregnancy test within the past 12 mos.        Passed - Recent (6 mo) or future (30 days) visit within the authorizing provider's specialty    Patient had office visit in the last 6 months or has a visit in the next 30 days with authorizing provider or within the authorizing provider's specialty.  See \"Patient Info\" tab in inbasket, or \"Choose Columns\" in Meds & Orders section of the refill encounter.            "

## 2019-01-17 ENCOUNTER — OFFICE VISIT (OUTPATIENT)
Dept: OBGYN | Facility: CLINIC | Age: 49
End: 2019-01-17
Payer: COMMERCIAL

## 2019-01-17 DIAGNOSIS — N93.9 ABNORMAL UTERINE BLEEDING (AUB): Primary | ICD-10-CM

## 2019-01-17 DIAGNOSIS — N93.9 ABNORMAL UTERINE BLEEDING (AUB): ICD-10-CM

## 2019-01-17 LAB — BETA HCG QUAL IFA URINE: NEGATIVE

## 2019-01-17 PROCEDURE — 88305 TISSUE EXAM BY PATHOLOGIST: CPT | Performed by: OBSTETRICS & GYNECOLOGY

## 2019-01-17 PROCEDURE — 58340 CATHETER FOR HYSTEROGRAPHY: CPT | Performed by: OBSTETRICS & GYNECOLOGY

## 2019-01-17 PROCEDURE — 84703 CHORIONIC GONADOTROPIN ASSAY: CPT | Performed by: OBSTETRICS & GYNECOLOGY

## 2019-01-17 PROCEDURE — 58100 BIOPSY OF UTERUS LINING: CPT | Mod: 51 | Performed by: OBSTETRICS & GYNECOLOGY

## 2019-01-17 NOTE — PATIENT INSTRUCTIONS
You can reach your [facility] Care Team any time of the day by calling [phone]. This number will put you in touch with the 24 hour nurse line if the clinic is closed.    To contact your OB/GYN Surgery Scheduler please call [phone]. This is a direct number for your care team between 8 a.m. and 4 p.m. Monday through Friday.    [facility] is open for your convenience: [phone]  Monday through Friday 8 a.m. to 8:30 p.m.  Saturday 9 a.m. to 6 p.m.  Sunday Noon to 6 p.m.    They are closed on all major holidays.

## 2019-01-19 NOTE — PROGRESS NOTES
Theresa Quarles is a 51 year old s.e.  female  ,natural family planning for contraception who presents for a sonohysterogram and endometrial biopsy to evaluate abnormal uterine bleeding..  She is experiencing some irregular vaginal bleeding.  The patient stopped OCPs in 2018.  The patient states that her true age is 51 and not 48 as listed, and age that was assigned to her when she came to this country.  The patient states that she gets menses every 1-1/2-2 months and flow can be very light up to her heavy flow of 6 days.  I reviewed her recent hemoglobin A1c of 6.6 and her recent normal lipid panel as well.  Patient states that she used to have bad dysmenorrhea but since childbirth this is resolved.  To control her type 2 DM the patient uses oral hypoglycemics as well as herbal supplements.  I reviewed the findings with the patient today and discussed the risk benefits and alternative forms of therapy.  I have answered all of her questions and a decision was made to proceed with the above procedures    Past Medical History:   Diagnosis Date     Cervical high risk HPV (human papillomavirus) test positive 2018    See problem list     Fatty liver 2012     GBS (group B streptococcus) infection 2008     gestational diabetes      Gestational diabetes mellitus, antepartum 3/9/2009     Hyperlipidemia LDL goal < 130 2010     Liver function test abnormality 2010     Pregnancies, high-risk 3/19/2009      ROS: 10 point ROS neg other than the symptoms noted above in the HPI.  There were no vitals taken for this visit.  Constitutional: healthy, alert and no distress  Genitourinary: Normal external genitalia without lesions and After obtaining informed consent, the pt was prepped in the usual sterile fashion the cervix canalized w a 5 mm Cook canula, the balloon inflated, the speculum removed, the cavity instilled w approx 8 cc of sterile water after placing the vaginal probe transducer and  the cavity examined. The fundus contained no obvious filling defects. The remainder of the cavity was smooth, concave without septums or other filling defects. The lower uterine seg was wnl.  The right ovary contained a simple cyst measuring approximately 4 cm in dimension.    after obtaining informed consent pt prepped in the usual sterile fashion and endometrial biopsy preformed w a pipelle type sampler without difficulty or complication w a thorough sampling and acceptable specimen.  The uterus sounded to 8 cm.  the pt tolerated the procedure well and was D/C'd in good condition. Signs and Sx's of complications disc, pt to call.  pt will call in 1 week to rev path report and develope an approp plan.    (N93.9) Abnormal uterine bleeding (AUB)  (primary encounter diagnosis)  Comment: Urine pregnancy test today was negative.  Sonohysterogram is benign.  We will await the results of the pathology report with appropriate therapy to follow  Plan: Beta HCG qual IFA urine, Surgical pathology         exam        Detailed written outline and findings with plan reviewed with the patient today

## 2019-01-21 LAB — COPATH REPORT: NORMAL

## 2019-01-22 NOTE — RESULT ENCOUNTER NOTE
I will further discuss this with the pt when I see her back in the office on 1/31/19  Kraig Arriaza M.D.

## 2019-01-31 ENCOUNTER — OFFICE VISIT (OUTPATIENT)
Dept: OBGYN | Facility: CLINIC | Age: 49
End: 2019-01-31
Payer: COMMERCIAL

## 2019-01-31 VITALS — BODY MASS INDEX: 24.33 KG/M2 | WEIGHT: 133 LBS | SYSTOLIC BLOOD PRESSURE: 120 MMHG | DIASTOLIC BLOOD PRESSURE: 70 MMHG

## 2019-01-31 DIAGNOSIS — N93.9 ABNORMAL UTERINE BLEEDING (AUB): Primary | ICD-10-CM

## 2019-01-31 PROCEDURE — 99213 OFFICE O/P EST LOW 20 MIN: CPT | Performed by: OBSTETRICS & GYNECOLOGY

## 2019-01-31 RX ORDER — NORETHINDRONE ACETATE AND ETHINYL ESTRADIOL 1MG-20(21)
1 KIT ORAL DAILY
Qty: 84 TABLET | Refills: 4 | Status: SHIPPED | OUTPATIENT
Start: 2019-01-31 | End: 2020-06-01

## 2019-01-31 NOTE — NURSING NOTE
"Chief Complaint   Patient presents with     Results       Initial /70   Wt 60.3 kg (133 lb)   LMP 2019   BMI 24.33 kg/m   Estimated body mass index is 24.33 kg/m  as calculated from the following:    Height as of 18: 1.575 m (5' 2\").    Weight as of this encounter: 60.3 kg (133 lb).  BP completed using cuff size: regular    Questioned patient about current smoking habits.  Pt. has never smoked.          The following HM Due: NONE      The following patient reported/Care Every where data was sent to:  P ABSTRACT QUALITY INITIATIVES [[id number]]        Charlee Cabrera [facility]                 "

## 2019-01-31 NOTE — PROGRESS NOTES
Theresa Quarles is a 48 year old s.e.  female  ,natural family planning for contraception who presents for an annual exam and pap.  She is experiencing some irregular vaginal bleeding.  The patient stopped OCPs in 2018.  The patient states that her true age is 51 and not 48, and age that was assigned to her when [place] came [place].  The patient states that she gets menses every 1-1/2-2 months and flow can be very light up to her heavy flow of 6 days.  I reviewed her recent hemoglobin A1c of 6.6 and her recent normal lipid panel as well.  Patient states that she used to have bad dysmenorrhea but since childbirth this is resolved.  To control her type 2 diabetes she uses oral hypoglycemics as well as herbal supplements.  As a part of her evaluation the patient underwent an endometrial biopsy on 2019 the results showing benign secret Tory endometrium with no evidence of hyperplasia atypia or malignancy.  The patient presents today for follow-up.  I reviewed these findings with the patient today and discussed with her the risk benefits and alternative forms of therapy.  We discussed diminished ovarian reserve the climacteric and menopausal issues and conservative, medical and surgical options.  I gave the patient a detailed written outline    Past Medical History:   Diagnosis Date     Cervical high risk HPV (human papillomavirus) test positive 2018    See problem list     Fatty liver 2012     GBS (group B streptococcus) infection 2008     gestational diabetes      Gestational diabetes mellitus, antepartum 3/9/2009     Hyperlipidemia LDL goal < 130 2010     Liver function test abnormality 2010     Pregnancies, high-risk 3/19/2009      ROS: 10 point ROS neg other than the symptoms noted above in the HPI.  /70   Wt 60.3 kg (133 lb)   LMP 2019   BMI 24.33 kg/m    Constitutional: healthy, alert and no distress    (N93.9) Abnormal uterine bleeding (AUB)   (primary encounter diagnosis)  Comment: After reviewing this discussion she like to resume the OCPs.  Junel 1/20 worked well for her in the past.  Instructions and indications for backup were reviewed.  She understands and accepts  Plan: norethindrone-ethinyl estradiol (JUNEL FE 1/20)        1-20 MG-MCG tablet        Patient will call if she does not achieve adequate cycle control or if she has any signs or symptoms of concern.  If the pill works well will reassess on an annual basis until she is menopausal.  Patient will call with any questions or concerns

## 2019-01-31 NOTE — PATIENT INSTRUCTIONS
You can reach your [facility] Care Team any time of the day by calling [phone]. This number will put you in touch with the 24 hour nurse line if the clinic is closed.    To contact your OB/GYN Surgery Scheduler please call [phone]. This is a direct number for your care team between 8 a.m. and 4 p.m. Monday through Friday.    [facility] is open for your convenience: [phone]  Monday through Friday 8 a.m. to 8:30 p.m.  Saturday 9 a.m. to 6 p.m.  Sunday Noon to 6 p.m.    They are closed on all major holidays.

## 2019-02-04 ENCOUNTER — TRANSFERRED RECORDS (OUTPATIENT)
Dept: HEALTH INFORMATION MANAGEMENT | Facility: CLINIC | Age: 49
End: 2019-02-04

## 2019-02-20 NOTE — TELEPHONE ENCOUNTER
[facility] clinic of neurology and [facility] radiology contacted to arrange imaging for patient. Please advise on Echo results   
Echocardiogram results essentially normal  
Pt informed.  
Reason for Call:  Request for results:    Name of test or procedure: echo     Date of test of procedure: 11/4/17     Location of the test or procedure: [facility]    OK to leave the result message on voice mail or with a family member? YES    Phone number Patient can be reached at:  Cell number on file:    Telephone Information:   Mobile [phone]       Additional comments: pt's wants to get the results of echo and pt also said that she did not receive a call from clinic of neurology eeg and ultrasound. Pt said she does not have a number to call them and wants to know if dr billings has sent the referral for eeg. Please call pt back for the results and referral information    Call taken on 11/16/2017 at 12:58 PM by YUNIER DACOSTA    
No

## 2019-02-22 ENCOUNTER — TELEPHONE (OUTPATIENT)
Dept: INTERNAL MEDICINE | Facility: CLINIC | Age: 49
End: 2019-02-22

## 2019-02-22 NOTE — TELEPHONE ENCOUNTER
Reason for Call:  Other call back    Detailed comments: Pt states that the dose of metformin was changed in August to 2 pills twice a day.  She is still receiving the amount for 1 pill twice a day, so she runs out.   Please call pt to discuss.  Phone Number Patient can be reached at: Home number on file [phone] (home)    Best Time: before 2pm    Can we leave a detailed message on this number? YES    Call taken on 2/22/2019 at 7:48 AM by ANNAMARIE CAMEJO

## 2019-03-23 DIAGNOSIS — E11.9 TYPE 2 DIABETES MELLITUS WITHOUT COMPLICATION, WITHOUT LONG-TERM CURRENT USE OF INSULIN (H): ICD-10-CM

## 2019-03-23 LAB
ALBUMIN SERPL-MCNC: 3.9 G/DL (ref 3.4–5)
ALP SERPL-CCNC: 60 U/L (ref 40–150)
ALT SERPL W P-5'-P-CCNC: 27 U/L (ref 0–50)
AST SERPL W P-5'-P-CCNC: 23 U/L (ref 0–45)
BILIRUB DIRECT SERPL-MCNC: 0.2 MG/DL (ref 0–0.2)
BILIRUB SERPL-MCNC: 0.5 MG/DL (ref 0.2–1.3)
HBA1C MFR BLD: 7.7 % (ref 0–5.6)
PROT SERPL-MCNC: 7.8 G/DL (ref 6.8–8.8)

## 2019-03-23 PROCEDURE — 36415 COLL VENOUS BLD VENIPUNCTURE: CPT | Performed by: INTERNAL MEDICINE

## 2019-03-23 PROCEDURE — 83036 HEMOGLOBIN GLYCOSYLATED A1C: CPT | Performed by: INTERNAL MEDICINE

## 2019-03-23 PROCEDURE — 80076 HEPATIC FUNCTION PANEL: CPT | Performed by: INTERNAL MEDICINE

## 2019-06-06 ENCOUNTER — OFFICE VISIT (OUTPATIENT)
Dept: INTERNAL MEDICINE | Facility: CLINIC | Age: 49
End: 2019-06-06
Payer: COMMERCIAL

## 2019-06-06 VITALS
BODY MASS INDEX: 24.14 KG/M2 | TEMPERATURE: 97.6 F | RESPIRATION RATE: 14 BRPM | HEART RATE: 89 BPM | SYSTOLIC BLOOD PRESSURE: 130 MMHG | HEIGHT: 62 IN | WEIGHT: 131.2 LBS | DIASTOLIC BLOOD PRESSURE: 78 MMHG | OXYGEN SATURATION: 99 %

## 2019-06-06 DIAGNOSIS — E78.5 HYPERLIPIDEMIA WITH TARGET LDL LESS THAN 130: ICD-10-CM

## 2019-06-06 DIAGNOSIS — E11.9 TYPE 2 DIABETES MELLITUS WITHOUT COMPLICATION, WITHOUT LONG-TERM CURRENT USE OF INSULIN (H): Primary | ICD-10-CM

## 2019-06-06 PROCEDURE — 99214 OFFICE O/P EST MOD 30 MIN: CPT | Performed by: INTERNAL MEDICINE

## 2019-06-06 RX ORDER — ATORVASTATIN CALCIUM 10 MG/1
10 TABLET, FILM COATED ORAL DAILY
Qty: 90 TABLET | Refills: 3 | Status: SHIPPED | OUTPATIENT
Start: 2019-06-06 | End: 2020-06-09

## 2019-06-06 RX ORDER — GLIMEPIRIDE 2 MG/1
2 TABLET ORAL
Qty: 90 TABLET | Refills: 3 | Status: SHIPPED | OUTPATIENT
Start: 2019-06-06 | End: 2020-05-04

## 2019-06-06 RX ORDER — GLIMEPIRIDE 1 MG/1
1 TABLET ORAL
Qty: 90 TABLET | Refills: 1 | Status: CANCELLED | OUTPATIENT
Start: 2019-06-06

## 2019-06-06 ASSESSMENT — MIFFLIN-ST. JEOR: SCORE: 1183.37

## 2019-06-06 NOTE — PROGRESS NOTES
Subjective     Theresa Quarles is a 49 year old female who presents to clinic today for the following health issues:    HPI   Diabetes Follow-up      How often are you checking your blood sugar? Not at all    What time of day are you checking your blood sugars (select all that apply)?  Not at all     Have you had any blood sugars above 200?  No    Have you had any blood sugars below 70?  No    What symptoms do you notice when your blood sugar is low?  None    What concerns do you have today about your diabetes? Other: follow up elevated a1c     Do you have any of these symptoms? (Select all that apply)  No numbness or tingling in feet.  No redness, sores or blisters on feet.  No complaints of excessive thirst.  No reports of blurry vision.  No significant changes to weight.     Have you had a diabetic eye exam in the last 12 months? Yes- Date of last eye exam: 1/1/2019, Dr. Farfan eye clinic, normal exam.    BP Readings from Last 2 Encounters:   01/31/19 120/70   11/23/18 122/72     Hemoglobin A1C (%)   Date Value   03/23/2019 7.7 (H)   09/24/2018 6.6 (H)     LDL Cholesterol Calculated (mg/dL)   Date Value   09/24/2018 64   08/09/2018 112 (H)       Diabetes Management Resources  Hyperlipidemia Follow-Up      Are you having any of the following symptoms? (Select all that apply)  No complaints of shortness of breath, chest pain or pressure.  No increased sweating or nausea with activity.  No left-sided neck or arm pain.  No complaints of pain in calves when walking 1-2 blocks.    Are you regularly taking any medication or supplement to lower your cholesterol?   Yes- none    Are you having muscle aches or other side effects that you think could be caused by your cholesterol lowering medication?  No        Amount of exercise or physical activity: variable    Problems taking medications regularly: No    Medication side effects: none    Diet: low fat/cholesterol and diabetic      Patient Active Problem List   Diagnosis      Gestational diabetes mellitus, antepartum     Liver function test abnormality     Hyperlipidemia with target LDL less than 130     Butler's metatarsalgia, neuralgia, or neuroma     Foot joint pain     Abnormality of gait     Other postprocedural status(V45.89)     Other peripheral enthesopathies     Fatty liver     Pyelonephritis     General counseling for prescription of oral contraceptives     Type 2 diabetes mellitus without complication, without long-term current use of insulin (H)     Abnormal uterine bleeding (AUB)     Cervical high risk HPV (human papillomavirus) test positive     Past Surgical History:   Procedure Laterality Date     BIOPSY BREAST  2012    needle bx benign     HC EXCISE HAND/FOOT NEUROMA       HC REMOVAL OF TONSILS,14+ Y/O      Tonsils 14+y.o.       Social History     Tobacco Use     Smoking status: Never Smoker     Smokeless tobacco: Never Used   Substance Use Topics     Alcohol use: No     Family History   Problem Relation Age of Onset     Cerebrovascular Disease Father              Thyroid Disease Mother         s/p thyrodectomy     Cancer - colorectal Mother         rectal cancer         Current Outpatient Medications   Medication Sig Dispense Refill     ACE/ARB/ARNI NOT PRESCRIBED (INTENTIONAL) Please choose reason not prescribed, below       aspirin (ASA) 81 MG tablet Take 1 tablet (81 mg) by mouth daily 90 tablet 3     atorvastatin (LIPITOR) 10 MG tablet Take 1 tablet (10 mg) by mouth daily 90 tablet 3     glimepiride (AMARYL) 2 MG tablet Take 1 tablet (2 mg) by mouth every morning (before breakfast) 90 tablet 3     metFORMIN (GLUCOPHAGE) 500 MG tablet Take 1 tablet (500 mg) by mouth 2 times daily (with meals) 180 tablet 3     norethindrone-ethinyl estradiol (JUNEL FE 1/20) 1-20 MG-MCG tablet Take 1 tablet by mouth daily 84 tablet 4     blood glucose monitoring (ACCU-CHEK BECKY PLUS) meter device kit Use to test blood sugars 3 times daily or as directed with appropriate  meter test strips and lancets and ETOH swabs, # 1 month, refill times 11 1 kit 5     CINNAMON PO Take  by mouth.       GIANVI 3-0.02 MG tablet TAKE 1 TABLET DAILY 84 tablet 2     MULTIVITAMINS PO TABS ONE DAILY 100 Tab 3     Allergies   Allergen Reactions     Lisinopril Hives     Noted angioedema     Recent Labs   Lab Test 03/23/19  0931 09/24/18  0929 08/09/18  0943 10/26/17  1105 04/10/17  1000 10/24/16  1025   A1C 7.7* 6.6* 7.1* 6.9* 6.6* 8.3*   LDL  --  64 112*  --  84 122*   HDL  --  43* 45*  --  47* 47*   TRIG  --  130 324*  --  387* 303*   ALT 27 59* 67*  --  41 79*   CR  --   --  0.50* 0.50* 0.52 0.50*   GFRESTIMATED  --   --  >90 >90 >90  Non  GFR Calc   >90  Non  GFR Calc     GFRESTBLACK  --   --  >90 >90 >90   GFR Calc   >90   GFR Calc     POTASSIUM  --   --  3.7 3.7 3.8 3.6   TSH  --   --   --  3.87  --  3.99      BP Readings from Last 3 Encounters:   01/31/19 120/70   11/23/18 122/72   10/29/18 126/80    Wt Readings from Last 3 Encounters:   01/31/19 60.3 kg (133 lb)   11/23/18 59.9 kg (132 lb)   10/29/18 60 kg (132 lb 3.2 oz)         Reviewed and updated as needed this visit by Provider         Review of Systems   ROS COMP: CONSTITUTIONAL: NEGATIVE for fever, chills, change in weight  ENT/MOUTH: NEGATIVE for ear, mouth and throat problems  RESP: NEGATIVE for significant cough or SOB  CV: NEGATIVE for chest pain, palpitations or peripheral edema  GI: NEGATIVE for nausea, abdominal pain, heartburn, or change in bowel habits  : NEGATIVE for frequency, dysuria, or hematuria  MUSCULOSKELETAL: NEGATIVE for significant arthralgias or myalgia  NEURO: NEGATIVE for weakness, dizziness or paresthesias  ENDOCRINE: NEGATIVE for temperature intolerance, skin/hair changes  HEME: NEGATIVE for bleeding problems  PSYCHIATRIC: NEGATIVE for changes in mood or affect      Objective    /78   Pulse 89   Temp 97.6  F (36.4  C) (Oral)   Resp 14   Ht  "1.575 m (5' 2\")   Wt 59.5 kg (131 lb 3.2 oz)   LMP 06/04/2019 (Exact Date)   SpO2 99%   Breastfeeding? No   BMI 24.00 kg/m    There is no height or weight on file to calculate BMI.  Physical Exam   GENERAL:  alert and no distress  EYES: Eyes grossly normal to inspection, PERRL and conjunctivae and sclerae normal  HENT: ear canals and TM's normal, nose and mouth without ulcers or lesions  NECK: no adenopathy, no asymmetry, masses, or scars and thyroid normal to palpation  RESP: lungs clear to auscultation - no rales, rhonchi or wheezes  CV: regular rate and rhythm, normal S1 S2, no S3 or S4, no click or rub, no peripheral edema and peripheral pulses strong  MS: no gross musculoskeletal defects noted  NEURO: No focal changes  PSYCH: mentation appears normal, affect normal/bright        Lab Results   Component Value Date    A1C 7.7 03/23/2019    A1C 6.6 09/24/2018    A1C 7.1 08/09/2018    A1C 6.9 10/26/2017    A1C 6.6 04/10/2017       LDL Cholesterol Calculated   Date Value Ref Range Status   09/24/2018 64 <100 mg/dL Final     Comment:     Desirable:       <100 mg/dl     Creatinine   Date Value Ref Range Status   08/09/2018 0.50 (L) 0.52 - 1.04 mg/dL Final     Assessment & Plan     1. Type 2 diabetes mellitus without complication, without long-term current use of insulin (H)  Increasing glimepiride dose to 2 mg daily with recheck A1c level in 3 months.  Discussed side effects with medication  - metFORMIN (GLUCOPHAGE) 500 MG tablet; Take 1 tablet (500 mg) by mouth 2 times daily (with meals)  Dispense: 180 tablet; Refill: 3  - glimepiride (AMARYL) 2 MG tablet; Take 1 tablet (2 mg) by mouth every morning (before breakfast)  Dispense: 90 tablet; Refill: 3  - **A1C FUTURE 3mo; Future  - ACE/ARB/ARNI NOT PRESCRIBED (INTENTIONAL); Please choose reason not prescribed, below  - aspirin (ASA) 81 MG tablet; Take 1 tablet (81 mg) by mouth daily  Dispense: 90 tablet; Refill: 3    Discussed monitoring blood pressure with noted " prior consideration for ACE inhibitor therapy but patient is not tolerated due to angioedema of lips.  Consider adding calcium channel blocker if blood pressure elevates above baseline, patient advised to monitor blood pressure at home    2. Hyperlipidemia with target LDL less than 130  Labs at goal consider blood lipid panel annually  - atorvastatin (LIPITOR) 10 MG tablet; Take 1 tablet (10 mg) by mouth daily  Dispense: 90 tablet; Refill: 3       Work on weight loss  Regular exercise  Reviewed prior mammograms with patient.    Return in about 3 months (around 9/6/2019) for diabetes check 3 months.    Arsenio Smith MD  [facility]

## 2019-06-06 NOTE — Clinical Note
Please abstract the following data from this visit with this patient into the appropriate field in Epic:Eye exam with ophthalmology on this date:Yes- Date of last eye exam: 1/1/2019, Dr. Farfan [facility], normal exam.

## 2019-07-09 DIAGNOSIS — E11.9 TYPE 2 DIABETES MELLITUS WITHOUT COMPLICATION, WITHOUT LONG-TERM CURRENT USE OF INSULIN (H): ICD-10-CM

## 2019-07-09 RX ORDER — GLIMEPIRIDE 1 MG/1
TABLET ORAL
Qty: 90 TABLET | Refills: 3 | OUTPATIENT
Start: 2019-07-09

## 2019-08-01 ENCOUNTER — TRANSFERRED RECORDS (OUTPATIENT)
Dept: MULTI SPECIALTY CLINIC | Facility: CLINIC | Age: 49
End: 2019-08-01

## 2019-09-06 ENCOUNTER — DOCUMENTATION ONLY (OUTPATIENT)
Dept: INTERNAL MEDICINE | Facility: CLINIC | Age: 49
End: 2019-09-06

## 2019-09-06 DIAGNOSIS — E11.9 TYPE 2 DIABETES MELLITUS WITHOUT COMPLICATION, WITHOUT LONG-TERM CURRENT USE OF INSULIN (H): Primary | ICD-10-CM

## 2019-09-07 DIAGNOSIS — E11.9 TYPE 2 DIABETES MELLITUS WITHOUT COMPLICATION, WITHOUT LONG-TERM CURRENT USE OF INSULIN (H): ICD-10-CM

## 2019-09-07 LAB
ANION GAP SERPL CALCULATED.3IONS-SCNC: 9 MMOL/L (ref 3–14)
BUN SERPL-MCNC: 9 MG/DL (ref 7–30)
CALCIUM SERPL-MCNC: 9.1 MG/DL (ref 8.5–10.1)
CHLORIDE SERPL-SCNC: 101 MMOL/L (ref 94–109)
CHOLEST SERPL-MCNC: 158 MG/DL
CO2 SERPL-SCNC: 26 MMOL/L (ref 20–32)
CREAT SERPL-MCNC: 0.53 MG/DL (ref 0.52–1.04)
GFR SERPL CREATININE-BSD FRML MDRD: >90 ML/MIN/{1.73_M2}
GLUCOSE SERPL-MCNC: 129 MG/DL (ref 70–99)
HBA1C MFR BLD: 7.8 % (ref 0–5.6)
HDLC SERPL-MCNC: 49 MG/DL
LDLC SERPL CALC-MCNC: 65 MG/DL
NONHDLC SERPL-MCNC: 109 MG/DL
POTASSIUM SERPL-SCNC: 3.8 MMOL/L (ref 3.4–5.3)
SODIUM SERPL-SCNC: 136 MMOL/L (ref 133–144)
T4 FREE SERPL-MCNC: 1.04 NG/DL (ref 0.76–1.46)
TRIGL SERPL-MCNC: 220 MG/DL
TSH SERPL DL<=0.005 MIU/L-ACNC: 5.41 MU/L (ref 0.4–4)

## 2019-09-07 PROCEDURE — 36415 COLL VENOUS BLD VENIPUNCTURE: CPT | Performed by: INTERNAL MEDICINE

## 2019-09-07 PROCEDURE — 83036 HEMOGLOBIN GLYCOSYLATED A1C: CPT | Performed by: INTERNAL MEDICINE

## 2019-09-07 PROCEDURE — 80061 LIPID PANEL: CPT | Performed by: INTERNAL MEDICINE

## 2019-09-07 PROCEDURE — 84443 ASSAY THYROID STIM HORMONE: CPT | Performed by: INTERNAL MEDICINE

## 2019-09-07 PROCEDURE — 80048 BASIC METABOLIC PNL TOTAL CA: CPT | Performed by: INTERNAL MEDICINE

## 2019-09-07 PROCEDURE — 84439 ASSAY OF FREE THYROXINE: CPT | Performed by: INTERNAL MEDICINE

## 2019-11-04 ENCOUNTER — ANCILLARY PROCEDURE (OUTPATIENT)
Dept: MAMMOGRAPHY | Facility: CLINIC | Age: 49
End: 2019-11-04
Attending: INTERNAL MEDICINE
Payer: COMMERCIAL

## 2019-11-04 ENCOUNTER — OFFICE VISIT (OUTPATIENT)
Dept: INTERNAL MEDICINE | Facility: CLINIC | Age: 49
End: 2019-11-04
Payer: COMMERCIAL

## 2019-11-04 ENCOUNTER — HEALTH MAINTENANCE LETTER (OUTPATIENT)
Age: 49
End: 2019-11-04

## 2019-11-04 VITALS
BODY MASS INDEX: 24.13 KG/M2 | RESPIRATION RATE: 14 BRPM | TEMPERATURE: 97.7 F | WEIGHT: 131.1 LBS | DIASTOLIC BLOOD PRESSURE: 80 MMHG | SYSTOLIC BLOOD PRESSURE: 134 MMHG | HEART RATE: 81 BPM | HEIGHT: 62 IN | OXYGEN SATURATION: 99 %

## 2019-11-04 DIAGNOSIS — E78.5 HYPERLIPIDEMIA WITH TARGET LDL LESS THAN 130: ICD-10-CM

## 2019-11-04 DIAGNOSIS — Z12.31 VISIT FOR SCREENING MAMMOGRAM: ICD-10-CM

## 2019-11-04 DIAGNOSIS — E11.9 TYPE 2 DIABETES MELLITUS WITHOUT COMPLICATION, WITHOUT LONG-TERM CURRENT USE OF INSULIN (H): ICD-10-CM

## 2019-11-04 DIAGNOSIS — Z00.00 ROUTINE GENERAL MEDICAL EXAMINATION AT A HEALTH CARE FACILITY: Primary | ICD-10-CM

## 2019-11-04 LAB
CREAT UR-MCNC: 159 MG/DL
HBA1C MFR BLD: 9 % (ref 0–5.6)
HGB BLD-MCNC: 13 G/DL (ref 11.7–15.7)
MICROALBUMIN UR-MCNC: 431 MG/L
MICROALBUMIN/CREAT UR: 271.07 MG/G CR (ref 0–25)
T4 FREE SERPL-MCNC: 1.01 NG/DL (ref 0.76–1.46)
TSH SERPL DL<=0.005 MIU/L-ACNC: 4.56 MU/L (ref 0.4–4)

## 2019-11-04 PROCEDURE — 90471 IMMUNIZATION ADMIN: CPT | Performed by: INTERNAL MEDICINE

## 2019-11-04 PROCEDURE — 77067 SCR MAMMO BI INCL CAD: CPT | Mod: TC

## 2019-11-04 PROCEDURE — 36415 COLL VENOUS BLD VENIPUNCTURE: CPT | Performed by: INTERNAL MEDICINE

## 2019-11-04 PROCEDURE — 99396 PREV VISIT EST AGE 40-64: CPT | Mod: 25 | Performed by: INTERNAL MEDICINE

## 2019-11-04 PROCEDURE — 99213 OFFICE O/P EST LOW 20 MIN: CPT | Mod: 25 | Performed by: INTERNAL MEDICINE

## 2019-11-04 PROCEDURE — 82043 UR ALBUMIN QUANTITATIVE: CPT | Performed by: INTERNAL MEDICINE

## 2019-11-04 PROCEDURE — 84439 ASSAY OF FREE THYROXINE: CPT | Performed by: INTERNAL MEDICINE

## 2019-11-04 PROCEDURE — 83036 HEMOGLOBIN GLYCOSYLATED A1C: CPT | Performed by: INTERNAL MEDICINE

## 2019-11-04 PROCEDURE — 85018 HEMOGLOBIN: CPT | Performed by: INTERNAL MEDICINE

## 2019-11-04 PROCEDURE — 90686 IIV4 VACC NO PRSV 0.5 ML IM: CPT | Performed by: INTERNAL MEDICINE

## 2019-11-04 PROCEDURE — 84443 ASSAY THYROID STIM HORMONE: CPT | Performed by: INTERNAL MEDICINE

## 2019-11-04 ASSESSMENT — MIFFLIN-ST. JEOR: SCORE: 1182.92

## 2019-11-04 NOTE — Clinical Note
Please abstract the following data from this visit with this patient into the appropriate field in Epic:Tests that can be patient reported without a hard copy:{Quality Abstract List (Optional):[id number]}Other Tests found in the patient's chart through Chart Review/Care Everywhere:Eye exam with ophthalmology on this date: Dr Farfan did eye exam 8/2019, normal less mild cataractsNote to Abstraction: If this section is blank, no results were found via Chart Review/Care Everywhere.

## 2019-11-04 NOTE — LETTER
[facility]  [address] [address]  [phone]      11/4/2019       Theresa Quarles  [address]  [facility] [address]        Dear Theresa,    Your hemoglobin is normal.    Your hemoglobin A1c is quite a bit higher than when last checked and this is likely due to the fact that you have been off your medication.  Please restart the medication as we discussed and plan on rechecking your A1c again in 3 months for comparison.    Your thyroid function test is still just a little bit above the normal range but has improved from when last checked.  I would recheck this value again in 3 to 6 months and if it continues to remain slightly elevated we may want to consider the addition of some oral thyroid medication.    Sincerely,      Arsenio Smith MD  Internal Medicine

## 2019-11-04 NOTE — PROGRESS NOTES
SUBJECTIVE:   CC: Theresa Quarles is an 49 year old woman who presents for preventive health visit.     Answers for HPI/ROS submitted by the patient on 11/4/2019   Annual Exam:  Frequency of exercise:: 4-5 days/week  Getting at least 3 servings of Calcium per day:: Yes  Diet:: Low fat/cholesterol, Vegetarian/vegan  Medication side effects:: None  Bi-annual eye exam:: Yes  Dental care twice a year:: Yes  Sleep apnea or symptoms of sleep apnea:: None  Additional concerns today:: No  Duration of exercise:: 15-30 minutes    Other concerns:  1. Patient states she has been off of metformin for a few months because pharmacy would not fill, stated no orders from clinic.  Review of her chart demonstrates that she was given a 3-month supply with 3 refills in June of this year but despite this apparently her pharmacy which is a mail away would not refill this that she has been off the medicine for a month and a half.    Today's PHQ-2 Score:   PHQ-2 ( 1999 [employer]) 11/4/2019 10/29/2018   Q1: Little interest or pleasure in doing things 0 0   Q2: Feeling down, depressed or hopeless 0 0   PHQ-2 Score 0 0   Q1: Little interest or pleasure in doing things Not at all Not at all   Q2: Feeling down, depressed or hopeless Not at all Not at all   PHQ-2 Score 0 0       Abuse: Current or Past(Physical, Sexual or Emotional)- No  Do you feel safe in your environment? Yes      Social History     Tobacco Use     Smoking status: Never Smoker     Smokeless tobacco: Never Used   Substance Use Topics     Alcohol use: No     If you drink alcohol do you typically have >3 drinks per day or >7 drinks per week? No                     Reviewed orders with patient.  Reviewed health maintenance and updated orders accordingly - Yes      Mammogram Screening: Patient under age 50, mutual decision reflected in health maintenance.      Pertinent mammograms are reviewed under the imaging tab.  History of abnormal Pap smear: NO - age 30-65 PAP every 5 years with  "negative HPV co-testing recommended  PAP / HPV Latest Ref Rng & Units 11/23/2018 10/24/2016 9/26/2013   PAP - NIL NIL NIL   HPV 16 DNA NEG:Negative Negative Negative -   HPV 18 DNA NEG:Negative Negative Negative -   OTHER HR HPV NEG:Negative Positive(A) Negative -     Reviewed and updated as needed this visit by clinical staff         Reviewed and updated as needed this visit by Provider         ROS:  CONSTITUTIONAL: NEGATIVE for fever, chills, change in weight  INTEGUMENTARU/SKIN: NEGATIVE for worrisome rashes, moles or lesions  EYES: NEGATIVE for vision changes or irritation, Dr Farfan did eye exam 8/2019, normal less mild cataracts  ENT: NEGATIVE for ear, mouth and throat problems  RESP: NEGATIVE for significant cough or SOB  CV: NEGATIVE for chest pain, palpitations or peripheral edema  GI: NEGATIVE for nausea, abdominal pain, heartburn, or change in bowel habits  : NEGATIVE for unusual urinary or vaginal symptoms. Periods are regular.  MUSCULOSKELETAL: NEGATIVE for significant arthralgias or myalgia  NEURO: NEGATIVE for weakness, dizziness or paresthesias  PSYCHIATRIC: NEGATIVE for changes in mood or affect    OBJECTIVE:   /80   Pulse 81   Temp 97.7  F (36.5  C) (Oral)   Resp 14   Ht 1.575 m (5' 2\")   Wt 59.5 kg (131 lb 1.6 oz)   LMP 10/10/2019 (Approximate)   SpO2 99%   Breastfeeding? No   BMI 23.98 kg/m    EXAM:  GENERAL: alert and no distress  EYES: Eyes grossly normal to inspection, PERRL and conjunctivae and sclerae normal, ?mild cataract R>L.  HENT: ear canals and TM's normal, nose and mouth without ulcers or lesions  NECK: no adenopathy, no asymmetry, masses, or scars and thyroid normal to palpation  RESP: lungs clear to auscultation - no rales, rhonchi or wheezes  BREAST: deferred per patient request  CV: regular rate and rhythm, normal S1 S2, no S3 or S4, no murmur, click or rub, no peripheral edema and peripheral pulses strong  ABDOMEN: soft, nontender, no hepatosplenomegaly, no masses " and bowel sounds normal   (female):  Deferred per upcoming pap smear per OB/GYN 12/2019  MS: no gross musculoskeletal defects noted, no edema  NEURO: Normal strength and tone, mentation intact and speech normal  PSYCH: mentation appears normal, affect normal/bright    Component      Latest Ref Rng & Units 9/7/2019   Sodium      133 - 144 mmol/L 136   Potassium      3.4 - 5.3 mmol/L 3.8   Chloride      94 - 109 mmol/L 101   Carbon Dioxide      20 - 32 mmol/L 26   Anion Gap      3 - 14 mmol/L 9   Glucose      70 - 99 mg/dL 129 (H)   Urea Nitrogen      7 - 30 mg/dL 9   Creatinine      0.52 - 1.04 mg/dL 0.53   GFR Estimate      >60 mL/min/1.73:m2 >90   GFR Estimate If Black      >60 mL/min/1.73:m2 >90   Calcium      8.5 - 10.1 mg/dL 9.1   Bilirubin Direct      0.0 - 0.2 mg/dL    Bilirubin Total      0.2 - 1.3 mg/dL    Albumin      3.4 - 5.0 g/dL    Protein Total      6.8 - 8.8 g/dL    Alkaline Phosphatase      40 - 150 U/L    ALT      0 - 50 U/L    AST      0 - 45 U/L    Cholesterol      <200 mg/dL 158   Triglycerides      <150 mg/dL 220 (H)   HDL Cholesterol      >49 mg/dL 49 (L)   LDL Cholesterol Calculated      <100 mg/dL 65   Non HDL Cholesterol      <130 mg/dL 109   Hemoglobin A1C      0 - 5.6 % 7.8 (H)   TSH      0.40 - 4.00 mU/L 5.41 (H)     Component      Latest Ref Rng & Units 3/23/2019   Sodium      133 - 144 mmol/L    Potassium      3.4 - 5.3 mmol/L    Chloride      94 - 109 mmol/L    Carbon Dioxide      20 - 32 mmol/L    Anion Gap      3 - 14 mmol/L    Glucose      70 - 99 mg/dL    Urea Nitrogen      7 - 30 mg/dL    Creatinine      0.52 - 1.04 mg/dL    GFR Estimate      >60 mL/min/1.73:m2    GFR Estimate If Black      >60 mL/min/1.73:m2    Calcium      8.5 - 10.1 mg/dL    Bilirubin Direct      0.0 - 0.2 mg/dL 0.2   Bilirubin Total      0.2 - 1.3 mg/dL 0.5   Albumin      3.4 - 5.0 g/dL 3.9   Protein Total      6.8 - 8.8 g/dL 7.8   Alkaline Phosphatase      40 - 150 U/L 60   ALT      0 - 50 U/L 27   AST       "0 - 45 U/L 23     ASSESSMENT/PLAN:   1. Routine general medical examination at a health care facility  She will obtain the flu vaccination today.  - INFLUENZA VACCINE IM > 6 MONTHS VALENT IIV4 [70446]  - Hemoglobin    Patient will be obtaining her Pap smear as well as breast exams through her gynecology appointment with Dr. ROBERTS in December    2. Type 2 diabetes mellitus without complication, without long-term current use of insulin (H)  Suggest restarting metformin and rechecking A1c today although suspect will be slightly high  - Hemoglobin A1c  - TSH with free T4 reflex  - metFORMIN (GLUCOPHAGE) 500 MG tablet; Take 1 tablet (500 mg) by mouth 2 times daily (with meals)  Dispense: 180 tablet; Refill: 3  - Albumin Random Urine Quantitative with Creat Ratio    3. Hyperlipidemia with target LDL less than 130  Labs ordered as fasting per routine.  Continuing with statin therapy.      COUNSELING:   Reviewed preventive health counseling, as reflected in patient instructions       Regular exercise       Healthy diet/nutrition    Estimated body mass index is 24 kg/m  as calculated from the following:    Height as of 6/6/19: 1.575 m (5' 2\").    Weight as of 6/6/19: 59.5 kg (131 lb 3.2 oz).         reports that she has never smoked. She has never used smokeless tobacco.      Counseling Resources:  ATP IV Guidelines  Pooled Cohorts Equation Calculator  Breast Cancer Risk Calculator  FRAX Risk Assessment  ICSI Preventive Guidelines  Dietary Guidelines for Americans, 2010  USDA's MyPlate  ASA Prophylaxis  Lung CA Screening    Arsenio Smiht MD  [facility]  "

## 2020-02-16 ENCOUNTER — HEALTH MAINTENANCE LETTER (OUTPATIENT)
Age: 50
End: 2020-02-16

## 2020-02-25 ENCOUNTER — ANCILLARY PROCEDURE (OUTPATIENT)
Dept: GENERAL RADIOLOGY | Facility: CLINIC | Age: 50
End: 2020-02-25
Attending: PHYSICIAN ASSISTANT
Payer: COMMERCIAL

## 2020-02-25 ENCOUNTER — OFFICE VISIT (OUTPATIENT)
Dept: URGENT CARE | Facility: URGENT CARE | Age: 50
End: 2020-02-25
Payer: COMMERCIAL

## 2020-02-25 VITALS
HEIGHT: 62 IN | BODY MASS INDEX: 24.25 KG/M2 | OXYGEN SATURATION: 98 % | DIASTOLIC BLOOD PRESSURE: 76 MMHG | TEMPERATURE: 98.7 F | WEIGHT: 131.8 LBS | SYSTOLIC BLOOD PRESSURE: 120 MMHG | HEART RATE: 86 BPM | RESPIRATION RATE: 14 BRPM

## 2020-02-25 DIAGNOSIS — R10.13 ABDOMINAL PAIN, EPIGASTRIC: ICD-10-CM

## 2020-02-25 DIAGNOSIS — K59.09 OTHER CONSTIPATION: ICD-10-CM

## 2020-02-25 DIAGNOSIS — K21.9 GASTROESOPHAGEAL REFLUX DISEASE, ESOPHAGITIS PRESENCE NOT SPECIFIED: Primary | ICD-10-CM

## 2020-02-25 DIAGNOSIS — R94.5 ABNORMAL RESULTS OF LIVER FUNCTION STUDIES: ICD-10-CM

## 2020-02-25 LAB
ALBUMIN SERPL-MCNC: 3.5 G/DL (ref 3.4–5)
ALBUMIN UR-MCNC: 30 MG/DL
ALP SERPL-CCNC: 95 U/L (ref 40–150)
ALT SERPL W P-5'-P-CCNC: 51 U/L (ref 0–50)
AMYLASE SERPL-CCNC: 106 U/L (ref 30–110)
ANION GAP SERPL CALCULATED.3IONS-SCNC: 7 MMOL/L (ref 3–14)
APPEARANCE UR: CLEAR
AST SERPL W P-5'-P-CCNC: 40 U/L (ref 0–45)
BACTERIA #/AREA URNS HPF: ABNORMAL /HPF
BASOPHILS # BLD AUTO: 0 10E9/L (ref 0–0.2)
BASOPHILS NFR BLD AUTO: 0.4 %
BILIRUB SERPL-MCNC: 0.4 MG/DL (ref 0.2–1.3)
BILIRUB UR QL STRIP: NEGATIVE
BUN SERPL-MCNC: 5 MG/DL (ref 7–30)
CALCIUM SERPL-MCNC: 9.5 MG/DL (ref 8.5–10.1)
CHLORIDE SERPL-SCNC: 99 MMOL/L (ref 94–109)
CO2 SERPL-SCNC: 27 MMOL/L (ref 20–32)
COLOR UR AUTO: YELLOW
CREAT SERPL-MCNC: 0.44 MG/DL (ref 0.52–1.04)
DIFFERENTIAL METHOD BLD: NORMAL
EOSINOPHIL # BLD AUTO: 0.2 10E9/L (ref 0–0.7)
EOSINOPHIL NFR BLD AUTO: 1.5 %
ERYTHROCYTE [DISTWIDTH] IN BLOOD BY AUTOMATED COUNT: 12.3 % (ref 10–15)
GFR SERPL CREATININE-BSD FRML MDRD: >90 ML/MIN/{1.73_M2}
GLUCOSE SERPL-MCNC: 129 MG/DL (ref 70–99)
GLUCOSE UR STRIP-MCNC: NEGATIVE MG/DL
HCT VFR BLD AUTO: 41.5 % (ref 35–47)
HGB BLD-MCNC: 14.1 G/DL (ref 11.7–15.7)
HGB UR QL STRIP: ABNORMAL
KETONES UR STRIP-MCNC: 15 MG/DL
LEUKOCYTE ESTERASE UR QL STRIP: NEGATIVE
LIPASE SERPL-CCNC: 116 U/L (ref 73–393)
LYMPHOCYTES # BLD AUTO: 3.6 10E9/L (ref 0.8–5.3)
LYMPHOCYTES NFR BLD AUTO: 33.1 %
MCH RBC QN AUTO: 29.1 PG (ref 26.5–33)
MCHC RBC AUTO-ENTMCNC: 34 G/DL (ref 31.5–36.5)
MCV RBC AUTO: 86 FL (ref 78–100)
MONOCYTES # BLD AUTO: 0.8 10E9/L (ref 0–1.3)
MONOCYTES NFR BLD AUTO: 7.3 %
NEUTROPHILS # BLD AUTO: 6.4 10E9/L (ref 1.6–8.3)
NEUTROPHILS NFR BLD AUTO: 57.7 %
NITRATE UR QL: NEGATIVE
NON-SQ EPI CELLS #/AREA URNS LPF: ABNORMAL /LPF
PH UR STRIP: 7 PH (ref 5–7)
PLATELET # BLD AUTO: 359 10E9/L (ref 150–450)
POTASSIUM SERPL-SCNC: 4 MMOL/L (ref 3.4–5.3)
PROT SERPL-MCNC: 8.1 G/DL (ref 6.8–8.8)
RBC # BLD AUTO: 4.85 10E12/L (ref 3.8–5.2)
RBC #/AREA URNS AUTO: ABNORMAL /HPF
SODIUM SERPL-SCNC: 133 MMOL/L (ref 133–144)
SOURCE: ABNORMAL
SP GR UR STRIP: 1.01 (ref 1–1.03)
UROBILINOGEN UR STRIP-ACNC: 0.2 EU/DL (ref 0.2–1)
WBC # BLD AUTO: 11 10E9/L (ref 4–11)
WBC #/AREA URNS AUTO: ABNORMAL /HPF

## 2020-02-25 PROCEDURE — 82150 ASSAY OF AMYLASE: CPT | Performed by: PHYSICIAN ASSISTANT

## 2020-02-25 PROCEDURE — 36415 COLL VENOUS BLD VENIPUNCTURE: CPT | Performed by: PHYSICIAN ASSISTANT

## 2020-02-25 PROCEDURE — 80053 COMPREHEN METABOLIC PANEL: CPT | Performed by: PHYSICIAN ASSISTANT

## 2020-02-25 PROCEDURE — 83690 ASSAY OF LIPASE: CPT | Performed by: PHYSICIAN ASSISTANT

## 2020-02-25 PROCEDURE — 74019 RADEX ABDOMEN 2 VIEWS: CPT

## 2020-02-25 PROCEDURE — 99214 OFFICE O/P EST MOD 30 MIN: CPT | Performed by: PHYSICIAN ASSISTANT

## 2020-02-25 PROCEDURE — 87086 URINE CULTURE/COLONY COUNT: CPT | Performed by: PHYSICIAN ASSISTANT

## 2020-02-25 PROCEDURE — 81001 URINALYSIS AUTO W/SCOPE: CPT | Performed by: PHYSICIAN ASSISTANT

## 2020-02-25 PROCEDURE — 87088 URINE BACTERIA CULTURE: CPT | Performed by: PHYSICIAN ASSISTANT

## 2020-02-25 PROCEDURE — 85025 COMPLETE CBC W/AUTO DIFF WBC: CPT | Performed by: PHYSICIAN ASSISTANT

## 2020-02-25 PROCEDURE — 87186 SC STD MICRODIL/AGAR DIL: CPT | Performed by: PHYSICIAN ASSISTANT

## 2020-02-25 ASSESSMENT — MIFFLIN-ST. JEOR: SCORE: 1181.09

## 2020-02-25 NOTE — PROGRESS NOTES
Patient presents with:  Abdominal Pain: since last Cfn-zesiyibum1-djcxadxzzvftq-worse after eating-constipation-took prilosec and tums-minimal relief    SUBJECTIVE  HPI:  Theresa Quarles is a 50 year old female who presents with epigastric pain since last week.  Onset was about 6 days ago with epigastric, burning, sometimes sharp.      Took prevacid for 3 days with some relief.  But stopped a few days ago and now pain is worse with some radiating to her mid back.      Last BM was 2 days ago.  She had constipation last week.  No blood in stools.      Some nausea, but no vomiting.       No fevers at home.      No recent travel or eating out.    No ill contacts.          Past Medical History:   Diagnosis Date     Cervical high risk HPV (human papillomavirus) test positive 11/23/2018    See problem list     Fatty liver 5/21/2012     GBS (group B streptococcus) infection 9/4/2008     gestational diabetes      Gestational diabetes mellitus, antepartum 3/9/2009     Hyperlipidemia LDL goal < 130 7/13/2010     Liver function test abnormality 7/13/2010     Pregnancies, high-risk 3/19/2009     Current Outpatient Medications   Medication Sig Dispense Refill     ACE/ARB/ARNI NOT PRESCRIBED (INTENTIONAL) Please choose reason not prescribed, below       aspirin (ASA) 81 MG tablet Take 1 tablet (81 mg) by mouth daily 90 tablet 3     atorvastatin (LIPITOR) 10 MG tablet Take 1 tablet (10 mg) by mouth daily 90 tablet 3     blood glucose monitoring (ACCU-CHEK BECKY PLUS) meter device kit Use to test blood sugars 3 times daily or as directed with appropriate meter test strips and lancets and ETOH swabs, # 1 month, refill times 11 1 kit 5     CINNAMON PO Take  by mouth.       GIANVI 3-0.02 MG tablet TAKE 1 TABLET DAILY 84 tablet 2     glimepiride (AMARYL) 2 MG tablet Take 1 tablet (2 mg) by mouth every morning (before breakfast) 90 tablet 3     metFORMIN (GLUCOPHAGE) 500 MG tablet Take 1 tablet (500 mg) by mouth 2 times daily (with meals)  "180 tablet 3     MULTIVITAMINS PO TABS ONE DAILY 100 Tab 3     norethindrone-ethinyl estradiol (JUNEL FE 1/20) 1-20 MG-MCG tablet Take 1 tablet by mouth daily 84 tablet 4     Social History     Tobacco Use     Smoking status: Never Smoker     Smokeless tobacco: Never Used   Substance Use Topics     Alcohol use: No       ROS:  CONSTITUTIONAL:NEGATIVE for fever, chills, change in weight  INTEGUMENTARY/SKIN: NEGATIVE for worrisome rashes, moles or lesions  EYES: NEGATIVE for vision changes or irritation  ENT/MOUTH: NEGATIVE for ear, mouth and throat problems  RESP:NEGATIVE for significant cough or SOB  CV: NEGATIVE for chest pain, palpitations or peripheral edema  GI: as per HPI  : negative  MUSCULOSKELETAL: NEGATIVE for significant arthralgias or myalgia  NEURO: NEGATIVE for weakness, dizziness or paresthesias  Review of systems negative except as stated above.    OBJECTIVE:  /76   Pulse 86   Temp 98.7  F (37.1  C) (Tympanic)   Resp 14   Ht 1.575 m (5' 2\")   Wt 59.8 kg (131 lb 12.8 oz)   SpO2 98%   Breastfeeding No   BMI 24.11 kg/m    GENERAL APPEARANCE: healthy, alert and no distress  EYES: EOMI,  PERRL, conjunctiva clear  HENT: ear canals and TM's normal.  Nose and mouth without ulcers, erythema or lesions  NECK: supple, nontender, no lymphadenopathy  RESP: lungs clear to auscultation - no rales, rhonchi or wheezes  CV: regular rates and rhythm, normal S1 S2, no murmur noted  ABDOMEN: epigastric tenderness without masses or rebound, no HSM or masses and bowel sounds normal  NEURO: Normal strength and tone, sensory exam grossly normal,  normal speech and mentation  SKIN: no suspicious lesions or rashes    Results for orders placed or performed in visit on 02/25/20   XR Abdomen 2 Views     Status: None    Narrative    XR ABDOMEN 2 VW 2/25/2020 11:08 AM    HISTORY: abdominal pain for one week, some constipation, worsening  pain.; Abdominal pain, epigastric    COMPARISON: None.      Impression    " IMPRESSION: Moderate amount of stool throughout the colon may indicate  a degree of constipation. No bowel obstruction or free intraperitoneal  air. No abnormal calcification.    SALVATORE CHIU MD   Results for orders placed or performed in visit on 02/25/20   Comprehensive metabolic panel (BMP + Alb, Alk Phos, ALT, AST, Total. Bili, TP)     Status: Abnormal   Result Value Ref Range    Sodium 133 133 - 144 mmol/L    Potassium 4.0 3.4 - 5.3 mmol/L    Chloride 99 94 - 109 mmol/L    Carbon Dioxide 27 20 - 32 mmol/L    Anion Gap 7 3 - 14 mmol/L    Glucose 129 (H) 70 - 99 mg/dL    Urea Nitrogen 5 (L) 7 - 30 mg/dL    Creatinine 0.44 (L) 0.52 - 1.04 mg/dL    GFR Estimate >90 >60 mL/min/[1.73_m2]    GFR Estimate If Black >90 >60 mL/min/[1.73_m2]    Calcium 9.5 8.5 - 10.1 mg/dL    Bilirubin Total 0.4 0.2 - 1.3 mg/dL    Albumin 3.5 3.4 - 5.0 g/dL    Protein Total 8.1 6.8 - 8.8 g/dL    Alkaline Phosphatase 95 40 - 150 U/L    ALT 51 (H) 0 - 50 U/L    AST 40 0 - 45 U/L   Amylase     Status: None   Result Value Ref Range    Amylase 106 30 - 110 U/L   CBC with platelets and differential     Status: None   Result Value Ref Range    WBC 11.0 4.0 - 11.0 10e9/L    RBC Count 4.85 3.8 - 5.2 10e12/L    Hemoglobin 14.1 11.7 - 15.7 g/dL    Hematocrit 41.5 35.0 - 47.0 %    MCV 86 78 - 100 fl    MCH 29.1 26.5 - 33.0 pg    MCHC 34.0 31.5 - 36.5 g/dL    RDW 12.3 10.0 - 15.0 %    Platelet Count 359 150 - 450 10e9/L    % Neutrophils 57.7 %    % Lymphocytes 33.1 %    % Monocytes 7.3 %    % Eosinophils 1.5 %    % Basophils 0.4 %    Absolute Neutrophil 6.4 1.6 - 8.3 10e9/L    Absolute Lymphocytes 3.6 0.8 - 5.3 10e9/L    Absolute Monocytes 0.8 0.0 - 1.3 10e9/L    Absolute Eosinophils 0.2 0.0 - 0.7 10e9/L    Absolute Basophils 0.0 0.0 - 0.2 10e9/L    Diff Method Automated Method    UA with Microscopic reflex to Culture     Status: Abnormal   Result Value Ref Range    Color Urine Yellow     Appearance Urine Clear     Glucose Urine Negative  NEG^Negative mg/dL    Bilirubin Urine Negative NEG^Negative    Ketones Urine 15 (A) NEG^Negative mg/dL    Specific Gravity Urine 1.015 1.003 - 1.035    pH Urine 7.0 5.0 - 7.0 pH    Protein Albumin Urine 30 (A) NEG^Negative mg/dL    Urobilinogen Urine 0.2 0.2 - 1.0 EU/dL    Nitrite Urine Negative NEG^Negative    Blood Urine Trace (A) NEG^Negative    Leukocyte Esterase Urine Negative NEG^Negative    Source Midstream Urine     WBC Urine 0 - 5 OTO5^0 - 5 /HPF    RBC Urine O - 2 OTO2^O - 2 /HPF    Squamous Epithelial /LPF Urine Moderate (A) FEW^Few /LPF    Bacteria Urine Few (A) NEG^Negative /HPF       creatinine and ALT have been abnormal in past last test 2018 similar     (K21.9) Gastroesophageal reflux disease, esophagitis presence not specified  (primary encounter diagnosis)  Comment:   Plan: omeprazole (PRILOSEC) 20 MG DR capsule        See handout.  Avoid spicy foods.  [place] diet until discomfort is resolved  Stay on Omeprazole for 4 weeks    (R10.13) Abdominal pain, epigastric  Comment:   Plan: Comprehensive metabolic panel (BMP + Alb, Alk         Phos, ALT, AST, Total. Bili, TP), Lipase,         Amylase, CBC with platelets and differential,         UA with Microscopic reflex to Culture, XR         Abdomen 2 Views, Urine Culture Aerobic         Bacterial            (R94.5) Abnormal results of liver function studies  Comment:   Plan:     (K59.09) Other constipation  Comment:   Plan: see handout.  You may try a glycerine suppository if the bowel movement is too uncomfortable.      See handouts.  Follow up with primary clinic within 4 weeks, sooner should symptoms persist or worsen.

## 2020-02-25 NOTE — PATIENT INSTRUCTIONS
(K21.9) Gastroesophageal reflux disease, esophagitis presence not specified  (primary encounter diagnosis)  Comment:   Plan: omeprazole (PRILOSEC) 20 MG DR capsule        See handout.  Avoid spicy foods.  [place] diet until discomfort is resolved  Stay on Omeprazole for 4 weeks    (R10.13) Abdominal pain, epigastric  Comment:   Plan: Comprehensive metabolic panel (BMP + Alb, Alk         Phos, ALT, AST, Total. Bili, TP), Lipase,         Amylase, CBC with platelets and differential,         UA with Microscopic reflex to Culture, XR         Abdomen 2 Views, Urine Culture Aerobic         Bacterial            (R94.5) Abnormal results of liver function studies  Comment:   Plan:     (K59.09) Other constipation  Comment:   Plan: see handout.  You may try a glycerine suppository if the bowel movement is too uncomfortable.      See handouts.  Follow up with primary clinic within 4 weeks, sooner should symptoms persist or worsen.        Patient Education     Constipation (Adult)  Constipation means that you have bowel movements that are less frequent than usual. Stools often become very hard and difficult to pass.  Constipation is very common. At some point in life, it affects almost everyone. Since everyone's bowel habits are different, what is constipation to one person may not be to another. Your healthcare provider may do tests to diagnose constipation. It depends on what he or she finds when evaluating you.    Symptoms of constipation include:    Abdominal pain    Bloating    Vomiting    Painful bowel movements    Itching, swelling, bleeding, or pain around the anus  Causes  Constipation can have many causes. These include:    Diet low in fiber    Too much dairy    Not drinking enough liquids    Lack of exercise or physical activity (especially true for older adults)    Changes in lifestyle or daily routine, including pregnancy, aging, work, and travel    Frequent use or misuse of laxatives    Ignoring the urge to have a  bowel movement or delaying it until later    Medicines, such as certain prescription pain medicines, iron supplements, antacids, certain antidepressants, and calcium supplements    Diseases like irritable bowel syndrome, bowel obstructions, stroke, diabetes, thyroid disease, Parkinson disease, hemorrhoids, and colon cancer  Complications  Potential complications of constipation can include:    Hemorrhoids    Rectal bleeding from hemorrhoids or anal fissures (skin tears)    Hernias    Dependency on laxatives    Chronic constipation    Fecal impaction, a severe form of constipation in which a large amount of hard stool is in your rectum that you can't pass    Bowel obstruction or perforation  Home care  All treatment should be done after talking with your healthcare provider. This is especially true if you have another medical problems, are taking prescription medicines, or are an older adult. Treatment most often involves lifestyle changes. You may also need medicines. Your healthcare provider will tell you which will work best for you. Follow the advice below to help avoid this problem in the future.  Lifestyle changes  These lifestyle changes can help prevent constipation:    Diet. Eat a high-fiber diet, with fresh fruit and vegetables, and reduce dairy intake, meats, and processed foods    Fluids. It's important to get enough fluids each day. Drink plenty of water when you eat more fiber. If you are on diet that limits the amount of fluid you can have, talk about this with your healthcare provider.    Regular exercise. Check with your healthcare provider first.  Medicines  Take any medicines as directed. Some laxatives are safe to use only every now and then. Others can be taken on a regular basis. While laxatives don't cause bowel dependence, they are treating the symptoms. So your constipation may return if you don't make other changes. Talk with your healthcare provider or pharmacist if you have  questions.  Prescription pain medicines can cause constipation. If you are taking this kind of medicine, ask your healthcare provider if you should also take a stool softener.  Medicines you may take to treat constipation include:    Fiber supplements    Stool softeners    Laxatives    Enemas    Rectal suppositories  Follow-up care  Follow up with your healthcare provider if symptoms don't get better in the next few days. You may need to have more tests or see a specialist.  Call 911  Call 911 if any of these occur:    Trouble breathing    Stiff, rigid abdomen that is severely painful to touch    Confusion    Fainting or loss of consciousness    Rapid heart rate    Chest pain  When to seek medical advice  Call your healthcare provider right away if any of these occur:    Fever of 100.4 F (38 C) or higher, or as directed by your healthcare provider    Failure to resume normal bowel movements    Pain in your abdomen or back gets worse    Nausea or vomiting    Swelling in your abdomen    Blood in the stool    Black, tarry stool    Involuntary weight loss    Weakness  Date Last Reviewed: 6/1/2018 2000-2019 [employer]. [address] [place] [address]. All rights reserved. This information is not intended as a substitute for professional medical care. Always follow your healthcare professional's instructions.           Patient Education     GERD (Adult)    The esophagus is a tube that carries food from the mouth to the stomach. A valve (the LES, lower esophageal sphincter) at the lower end of the esophagus prevents stomach acid from flowing upward. When this valve doesn't work properly, stomach contents may repeatedly flow back up (reflux) into the esophagus. This is called gastroesophageal reflux disease (GERD). GERD can irritate the esophagus. It can cause problems with pain, swallowing or breathing. In severe cases, GERD can cause recurrent pneumonia (from aspiration or breathing in  "particles) or other serious problems.  Symptoms of reflux include burning, pressure or sharp pain in the upper abdomen or mid to lower chest. The pain can spread to the neck, back, or shoulder. There may be belching, an acid taste in the back of the throat, chronic cough, or sore throat, or hoarseness. GERD symptoms often occur during the day after a big meal. They can also occur at night when lying down.   Home care  Lifestyle changes can help reduce symptoms. If needed, your healthcare provider may prescribe medicines. Symptoms often improve with treatment, but if treatment is stopped, the symptoms often return after a few months. So most persons with GERD will need to continue treatment or get treatment on and off.  Lifestyle changes    Limit or avoid fatty, fried, and spicy foods, as well as coffee, chocolate, mint, and foods with high acid content such as tomatoes and citrus fruit and juices (orange, grapefruit, lemon).    Don t eat large meals, especially at night. Frequent, smaller meals are best. Don't lie down right after eating. And don t eat anything 3 hours before going to bed.    Don't drink alcohol or smoke. As much as possible, stay away from second hand smoke.    If you are overweight, losing weight will reduce symptoms.     Don't wear tight clothing around your stomach area.    If your symptoms occur during sleep, use a foam wedge to elevate your upper body (not just your head.) Or, place 4\" blocks under the head of your bed. Or use 2 bed risers under your bedframe.  Medicines  If needed, medicines can help relieve the symptoms of GERD and prevent damage to the esophagus. Discuss a medicine plan with your healthcare provider. This may include one or more of the following medicines:    Antacids to help neutralize the normal acids in your stomach.    Acid blockers (Histamine or H2 blockers) to decrease acid production.    Acid inhibitors (proton pump inhibitors PPIs) to decrease acid production in a " different way than the blockers. They may work better, but can take a little longer to take effect.  Take an antacid 30 to 60 minutes after eating and at bedtime, but not at the same time as an acid blocker.  Try not to take medicines such as ibuprofen and aspirin. If you are taking aspirin for your heart or other medical reasons, talk to your healthcare provider about stopping it.  Follow-up care  Follow up with your healthcare provider or as advised by our staff.  When to seek medical advice  Call your healthcare provider if any of the following occur:    Stomach pain gets worse or moves to the lower right abdomen (appendix area)    Chest pain appears or gets worse, or spreads to the back, neck, shoulder, or arm    An over-the-counter trial of medicine doesn't relieve your symptoms    Weight loss that can't be explained    Trouble or pain swallowing    Frequent vomiting (can t keep down liquids)    Blood in the stool or vomit (red or black in color)    Feeling weak or dizzy    Fever of 100.4 F (38 C) or higher, or as directed by your healthcare provider  Date Last Reviewed: 3/1/2018    7020-6510 [employer]. [address] [place] [address]. All rights reserved. This information is not intended as a substitute for professional medical care. Always follow your healthcare professional's instructions.

## 2020-02-26 DIAGNOSIS — N30.00 ACUTE CYSTITIS WITHOUT HEMATURIA: Primary | ICD-10-CM

## 2020-02-26 RX ORDER — NITROFURANTOIN 25; 75 MG/1; MG/1
100 CAPSULE ORAL 2 TIMES DAILY
Qty: 14 CAPSULE | Refills: 0 | Status: SHIPPED | OUTPATIENT
Start: 2020-02-26 | End: 2020-06-09

## 2020-02-26 NOTE — TELEPHONE ENCOUNTER
+ urine culture, sensitivities pending.  Start ABX as prescribed. Spoke with patient, understands treatment plan.     Connie Simmons PA-C

## 2020-02-27 LAB
BACTERIA SPEC CULT: ABNORMAL
SPECIMEN SOURCE: ABNORMAL

## 2020-03-04 ENCOUNTER — OFFICE VISIT (OUTPATIENT)
Dept: URGENT CARE | Facility: URGENT CARE | Age: 50
End: 2020-03-04
Payer: COMMERCIAL

## 2020-03-04 VITALS
RESPIRATION RATE: 16 BRPM | HEART RATE: 125 BPM | TEMPERATURE: 99.1 F | DIASTOLIC BLOOD PRESSURE: 86 MMHG | BODY MASS INDEX: 24.51 KG/M2 | WEIGHT: 134 LBS | OXYGEN SATURATION: 97 % | SYSTOLIC BLOOD PRESSURE: 146 MMHG

## 2020-03-04 DIAGNOSIS — J10.1 INFLUENZA A: Primary | ICD-10-CM

## 2020-03-04 DIAGNOSIS — E11.9 TYPE 2 DIABETES MELLITUS WITHOUT COMPLICATION, WITHOUT LONG-TERM CURRENT USE OF INSULIN (H): ICD-10-CM

## 2020-03-04 DIAGNOSIS — Z20.828 EXPOSURE TO INFLUENZA: ICD-10-CM

## 2020-03-04 LAB
FLUAV+FLUBV AG SPEC QL: NEGATIVE
FLUAV+FLUBV AG SPEC QL: POSITIVE
SPECIMEN SOURCE: ABNORMAL

## 2020-03-04 PROCEDURE — 87804 INFLUENZA ASSAY W/OPTIC: CPT | Performed by: INTERNAL MEDICINE

## 2020-03-04 PROCEDURE — 99214 OFFICE O/P EST MOD 30 MIN: CPT | Performed by: FAMILY MEDICINE

## 2020-03-04 RX ORDER — BENZONATATE 200 MG/1
200 CAPSULE ORAL 3 TIMES DAILY PRN
Qty: 20 CAPSULE | Refills: 0 | Status: SHIPPED | OUTPATIENT
Start: 2020-03-04 | End: 2020-06-09

## 2020-03-04 RX ORDER — OSELTAMIVIR PHOSPHATE 75 MG/1
75 CAPSULE ORAL 2 TIMES DAILY
Qty: 10 CAPSULE | Refills: 0 | Status: SHIPPED | OUTPATIENT
Start: 2020-03-04 | End: 2020-06-09

## 2020-03-04 NOTE — LETTER
[facility] [place]  [address] [id number] [phone]      March 4, 2020    RE:  Theresa Quarles                                                                                                                                                       [address]  [facility] [address]            To whom it may concern:    Theresa Quarles is under my professional care for    Fever  Type 2 diabetes mellitus without complication, without long-term current use of insulin (H)  Exposure to influenza.   May return to work   with no restrictions when shortness of breath, severe cough, fevers and chills are resolved.  Influenza is usually infectious for 7-10 days..          Sincerely,        Ruby Vazquez MD    [facility]

## 2020-03-05 NOTE — PROGRESS NOTES
SUBJECTIVE:   Chief Complaint   Patient presents with     Flu Symptoms     chills, sweats, headaches, sinus pressure      Fatigue     body aches x 24 hours      Theresa Quarles is a 50 year old female who present complaining of flu-like symptoms: fevers, chills, myalgias, headache,  congestion, sore throat and cough for 1 days. Denies   dyspnea /  wheezing.  Has   known exposure to people with influenza-  Her daughter was recently diagnosed with influenza A    Patient has  increased risk of influenza complications due to  Her type 2 diabetes (taking metformin and glimepiride)  Hgb a1c 9.0    3 months ago    Past Medical History:   Diagnosis Date     Cervical high risk HPV (human papillomavirus) test positive 11/23/2018    See problem list     Fatty liver 5/21/2012     GBS (group B streptococcus) infection 9/4/2008     gestational diabetes      Gestational diabetes mellitus, antepartum 3/9/2009     Hyperlipidemia LDL goal < 130 7/13/2010     Liver function test abnormality 7/13/2010     Pregnancies, high-risk 3/19/2009     Patient Active Problem List   Diagnosis     Gestational diabetes mellitus, antepartum     Liver function test abnormality     Hyperlipidemia with target LDL less than 130     Butler's metatarsalgia, neuralgia, or neuroma     Foot joint pain     Abnormality of gait     Other postprocedural status(V45.89)     Other peripheral enthesopathies     Fatty liver     Pyelonephritis     General counseling for prescription of oral contraceptives     Type 2 diabetes mellitus without complication, without long-term current use of insulin (H)     Abnormal uterine bleeding (AUB)     Cervical high risk HPV (human papillomavirus) test positive       ALLERGIES:  Lisinopril    MEDs  ACE/ARB/ARNI NOT PRESCRIBED (INTENTIONAL), Please choose reason not prescribed, below  aspirin (ASA) 81 MG tablet, Take 1 tablet (81 mg) by mouth daily  atorvastatin (LIPITOR) 10 MG tablet, Take 1 tablet (10 mg) by mouth daily  blood  glucose monitoring (ACCU-CHEK BECKY PLUS) meter device kit, Use to test blood sugars 3 times daily or as directed with appropriate meter test strips and lancets and ETOH swabs, # 1 month, refill times 11  CINNAMON PO, Take  by mouth.  GIANVI 3-0.02 MG tablet, TAKE 1 TABLET DAILY  glimepiride (AMARYL) 2 MG tablet, Take 1 tablet (2 mg) by mouth every morning (before breakfast)  metFORMIN (GLUCOPHAGE) 500 MG tablet, Take 1 tablet (500 mg) by mouth 2 times daily (with meals)  MULTIVITAMINS PO TABS, ONE DAILY  nitroFURantoin macrocrystal-monohydrate (MACROBID) 100 MG capsule, Take 1 capsule (100 mg) by mouth 2 times daily for 7 days  norethindrone-ethinyl estradiol (JUNEL ) 1-20 MG-MCG tablet, Take 1 tablet by mouth daily  omeprazole (PRILOSEC) 20 MG DR capsule, Take 1 capsule (20 mg) by mouth daily    No current facility-administered medications on file prior to visit.       Social History     Tobacco Use     Smoking status: Never Smoker     Smokeless tobacco: Never Used   Substance Use Topics     Alcohol use: No       Family History   Problem Relation Age of Onset     Cerebrovascular Disease Father              Thyroid Disease Mother         s/p thyrodectomy     Cancer - colorectal Mother         rectal cancer         ROS:  CONSTITUTIONAL:  fever, chills,    INTEGUMENTARY/SKIN: NEGATIVE for worrisome rashes,   or lesions  EYES: NEGATIVE for vision changes or irritation  GI: NEGATIVE for nausea, abdominal pain,  or change in bowel habits     OBJECTIVE  :BP (!) 146/86 (BP Location: Right arm, Patient Position: Chair, Cuff Size: Adult Regular)   Pulse 125   Temp 99.1  F (37.3  C) (Oral)   Resp 16   Wt 60.8 kg (134 lb)   SpO2 97%   BMI 24.51 kg/m    GENERAL APPEARANCE: alert, moderate distress, flushed and fatigued,  Occasional congested cough  EYES: EOMI,  PERRL, conjunctiva clear  HENT: ear canals and TM's normal.  Nose and mouth without ulcers, erythema or lesions  NECK: supple, nontender, no  lymphadenopathy  RESP: lungs clear to auscultation - no rales, rhonchi or wheezes  CV: regular rates and rhythm, normal S1 S2, no murmur noted  NEURO: Normal strength and tone, sensory exam grossly normal,  normal speech and mentation  SKIN: no suspicious lesions or rashes        ASSESSMENT:  Type 2 diabetes mellitus without complication, without long-term current use of insulin (H)    Discussed her increased risk of complications from bacterial superinfection due to her diabetes-  She should monitor for symptoms and return if worsening    Exposure to influenza     - Influenza A/B antigen    Influenza A     - oseltamivir (TAMIFLU) 75 MG capsule; Take 1 capsule (75 mg) by mouth 2 times daily for 5 days May give suspension as alternative  - benzonatate (TESSALON) 200 MG capsule; Take 1 capsule (200 mg) by mouth 3 times daily as needed for cough     We discussed the limitations of influenza testing and limitations of  antiviral therapy against influenza.   Usually influenza treatment should  be initiated within 2 days of the start of symptoms however influenza  is more critical for persons with weak immunity and chronic respiratory illnesses,       Symptomatic therapy suggested:  Rest, drink lots of fluids,  Acetaminophen / ibuprofen for body aches and fever,  Salt water gargles for sore throat,  OTC anesthetic lozenges for sore throat,  OTC cough medications.   Call or return to clinic or go to the ER if these symptoms worsen or fail to improve as anticipated.       Treatment and prophylaxis for close contacts  was discussed  Advised that influenza illness usually lasts a week, though usually longer in people with weak immmunity.  The patient should avoid contact with others, and cover the mouth when coughing to limit spread of the infection.    Discussed that influenza is a potent virus that can cause damage to airways making increased risk for developing bronchitis or pneumonia.  In patients with weak immunity   an   Antibiotic is added to treat / try to prevent bacterial superinfection in the chest, but I explained that the antibiotic is not effective against the influenza virus.    Note for work

## 2020-03-05 NOTE — PATIENT INSTRUCTIONS
Patient Education     Influenza (Adult)    Influenza is also called the flu. It is a viral illness that affects the air passages of your lungs. It is different from the common cold. The flu can easily be passed from one to person to another. It may be spread through the air by coughing and sneezing. Or it can be spread by touching the sick person and then touching your own eyes, nose, or mouth.  The flu starts 1 to 3 days after you are exposed to the flu virus. It may last for 1 to 2 weeks but many people feel tired or fatigued for many weeks afterward. You usually don t need to take antibiotics unless you have a complication. This might be an ear or sinus infection or pneumonia.  Symptoms of the flu may be mild or severe. They can include extreme tiredness (wanting to stay in bed all day), chills, fevers, muscle aches, soreness with eye movement, headache, and a dry, hacking cough.  Home care  Follow these guidelines when caring for yourself at home:    Avoid being around cigarette smoke, whether yours or other people s.    Acetaminophen or ibuprofen will help ease your fever, muscle aches, and headache. Don t give aspirin to anyone younger than 18 who has the flu. Aspirin can harm the liver.    Nausea and loss of appetite are common with the flu. Eat light meals. Drink 6 to 8 glasses of liquids every day. Good choices are water, sport drinks, soft drinks without caffeine, juices, tea, and soup. Extra fluids will also help loosen secretions in your nose and lungs.    Over-the-counter cold medicines will not make the flu go away faster. But the medicines may help with coughing, sore throat, and congestion in your nose and sinuses. Don t use a decongestant if you have high blood pressure.    Stay home until your fever has been gone for at least 24 hours without using medicine to reduce fever.  Follow-up care  Follow up with your healthcare provider, or as advised, if you are not getting better over the next  week.  If you are age 65 or older, talk with your provider about getting a pneumococcal vaccine every 5 years. You should also get this vaccine if you have chronic asthma or COPD. All adults should get a flu vaccine every fall. Ask your provider about this.  When to seek medical advice  Call your healthcare provider right away if any of these occur:    Cough with lots of colored mucus (sputum) or blood in your mucus    Chest pain, shortness of breath, wheezing, or trouble breathing    Severe headache, or face, neck, or ear pain    New rash with fever    Fever of 100.4 F (38 C) or higher, or as directed by your healthcare provider    Confusion, behavior change, or seizure    Severe weakness or dizziness    You get a new fever or cough after getting better for a few days  Date Last Reviewed: 1/1/2017 2000-2019 [employer]. [address] [place] [address], [place] [address]. All rights reserved. This information is not intended as a substitute for professional medical care. Always follow your healthcare professional's instructions.

## 2020-03-11 ENCOUNTER — OFFICE VISIT (OUTPATIENT)
Dept: URGENT CARE | Facility: URGENT CARE | Age: 50
End: 2020-03-11
Payer: COMMERCIAL

## 2020-03-11 VITALS
SYSTOLIC BLOOD PRESSURE: 118 MMHG | OXYGEN SATURATION: 96 % | HEART RATE: 82 BPM | TEMPERATURE: 99.1 F | DIASTOLIC BLOOD PRESSURE: 74 MMHG | RESPIRATION RATE: 16 BRPM | WEIGHT: 132.4 LBS | BODY MASS INDEX: 24.22 KG/M2

## 2020-03-11 DIAGNOSIS — R07.0 THROAT PAIN: Primary | ICD-10-CM

## 2020-03-11 PROCEDURE — 40001204 ZZHCL STATISTIC STREP A RAPID: Performed by: FAMILY MEDICINE

## 2020-03-11 PROCEDURE — 99213 OFFICE O/P EST LOW 20 MIN: CPT | Performed by: FAMILY MEDICINE

## 2020-03-11 PROCEDURE — 87651 STREP A DNA AMP PROBE: CPT | Performed by: FAMILY MEDICINE

## 2020-03-11 RX ORDER — CODEINE PHOSPHATE/GUAIFENESIN 10-100MG/5
5 LIQUID (ML) ORAL EVERY 4 HOURS PRN
Qty: 240 ML | Refills: 0 | Status: SHIPPED | OUTPATIENT
Start: 2020-03-11 | End: 2020-06-09

## 2020-03-11 NOTE — PROGRESS NOTES
CHIEF COMPLAINT:   Chief Complaint   Patient presents with     Cough     seen last wednesday for cough with throat pain dx w/ flu condition worsening.        SUBJECTIVE:   Theresa Quarles is a 50 year old female presenting with a chief complaint of sore throat.  Onset of symptoms was 1 week(s) ago.  Course of illness is waxing and waning.    Severity moderate  Current and Associated symptoms: chills  Treatment measures tried include Tylenol/Ibuprofen.  Predisposing factors include recent illness .    Past Medical History:   Diagnosis Date     Cervical high risk HPV (human papillomavirus) test positive 11/23/2018    See problem list     Fatty liver 5/21/2012     GBS (group B streptococcus) infection 9/4/2008     gestational diabetes      Gestational diabetes mellitus, antepartum 3/9/2009     Hyperlipidemia LDL goal < 130 7/13/2010     Liver function test abnormality 7/13/2010     Pregnancies, high-risk 3/19/2009     Current Outpatient Medications   Medication Sig Dispense Refill     ACE/ARB/ARNI NOT PRESCRIBED (INTENTIONAL) Please choose reason not prescribed, below       aspirin (ASA) 81 MG tablet Take 1 tablet (81 mg) by mouth daily 90 tablet 3     atorvastatin (LIPITOR) 10 MG tablet Take 1 tablet (10 mg) by mouth daily 90 tablet 3     benzonatate (TESSALON) 200 MG capsule Take 1 capsule (200 mg) by mouth 3 times daily as needed for cough 20 capsule 0     blood glucose monitoring (ACCU-CHEK BECKY PLUS) meter device kit Use to test blood sugars 3 times daily or as directed with appropriate meter test strips and lancets and ETOH swabs, # 1 month, refill times 11 1 kit 5     CINNAMON PO Take  by mouth.       GIANVI 3-0.02 MG tablet TAKE 1 TABLET DAILY 84 tablet 2     glimepiride (AMARYL) 2 MG tablet Take 1 tablet (2 mg) by mouth every morning (before breakfast) 90 tablet 3     metFORMIN (GLUCOPHAGE) 500 MG tablet Take 1 tablet (500 mg) by mouth 2 times daily (with meals) 180 tablet 3     MULTIVITAMINS PO TABS ONE DAILY  100 Tab 3     omeprazole (PRILOSEC) 20 MG DR capsule Take 1 capsule (20 mg) by mouth daily 30 capsule 0     norethindrone-ethinyl estradiol (JUNEL FE 1/20) 1-20 MG-MCG tablet Take 1 tablet by mouth daily 84 tablet 4     Social History     Tobacco Use     Smoking status: Never Smoker     Smokeless tobacco: Never Used   Substance Use Topics     Alcohol use: No     ROS:  INTEGUMENTARY/SKIN: NEGATIVE for worrisome rashes, moles or lesions  EYES: NEGATIVE for vision changes or irritation    OBJECTIVE:  /74   Pulse 82   Temp 99.1  F (37.3  C) (Oral)   Resp 16   Wt 60.1 kg (132 lb 6.4 oz)   SpO2 96%   BMI 24.22 kg/m    GENERAL APPEARANCE: healthy, alert and no distress  EYES: EOMI,  PERRL, conjunctiva clear  HENT: ear canals and TM's normal.  Nose and mouth without ulcers, erythema or lesions  NECK: supple, nontender, no lymphadenopathy  RESP: lungs clear to auscultation - no rales, rhonchi or wheezes  CV: regular rates and rhythm, normal S1 S2, no murmur noted  NEURO: Normal strength and tone, sensory exam grossly normal,  normal speech and mentation  SKIN: no suspicious lesions or rashes    ASSESSMENT:  1. Throat pain  Symptomatic cares were discussed in detail.   Pt instructed to come back to the clinic for worsening sx    - Streptococcus A Rapid Scr w Reflx to PCR  - Group A Streptococcus PCR Throat Swab  - guaiFENesin-codeine (GUAIFENESIN AC) 100-10 MG/5ML syrup; Take 5 mLs by mouth every 4 hours as needed for congestion  Dispense: 240 mL; Refill: 0  - Group A Streptococcus PCR Throat Swab

## 2020-03-12 LAB
DEPRECATED S PYO AG THROAT QL EIA: NEGATIVE
SPECIMEN SOURCE: NORMAL
SPECIMEN SOURCE: NORMAL
STREP GROUP A PCR: NOT DETECTED

## 2020-04-16 ENCOUNTER — OFFICE VISIT (OUTPATIENT)
Dept: OBGYN | Facility: CLINIC | Age: 50
End: 2020-04-16
Payer: COMMERCIAL

## 2020-04-16 VITALS
SYSTOLIC BLOOD PRESSURE: 138 MMHG | HEIGHT: 62 IN | WEIGHT: 133.4 LBS | DIASTOLIC BLOOD PRESSURE: 78 MMHG | BODY MASS INDEX: 24.55 KG/M2

## 2020-04-16 DIAGNOSIS — R87.810 CERVICAL HIGH RISK HPV (HUMAN PAPILLOMAVIRUS) TEST POSITIVE: Primary | ICD-10-CM

## 2020-04-16 DIAGNOSIS — Z12.11 SPECIAL SCREENING FOR MALIGNANT NEOPLASMS, COLON: ICD-10-CM

## 2020-04-16 DIAGNOSIS — N89.8 VAGINAL ITCHING: ICD-10-CM

## 2020-04-16 DIAGNOSIS — E11.00 TYPE 2 DIABETES MELLITUS WITH HYPEROSMOLARITY WITHOUT COMA, WITHOUT LONG-TERM CURRENT USE OF INSULIN (H): ICD-10-CM

## 2020-04-16 DIAGNOSIS — Z01.411 ENCOUNTER FOR GYNECOLOGICAL EXAMINATION WITH ABNORMAL FINDING: ICD-10-CM

## 2020-04-16 LAB
SPECIMEN SOURCE: ABNORMAL
WET PREP SPEC: ABNORMAL

## 2020-04-16 PROCEDURE — 87624 HPV HI-RISK TYP POOLED RSLT: CPT | Performed by: OBSTETRICS & GYNECOLOGY

## 2020-04-16 PROCEDURE — 99213 OFFICE O/P EST LOW 20 MIN: CPT | Mod: 25 | Performed by: OBSTETRICS & GYNECOLOGY

## 2020-04-16 PROCEDURE — 87210 SMEAR WET MOUNT SALINE/INK: CPT | Performed by: OBSTETRICS & GYNECOLOGY

## 2020-04-16 PROCEDURE — 99396 PREV VISIT EST AGE 40-64: CPT | Performed by: OBSTETRICS & GYNECOLOGY

## 2020-04-16 PROCEDURE — 88175 CYTOPATH C/V AUTO FLUID REDO: CPT | Performed by: OBSTETRICS & GYNECOLOGY

## 2020-04-16 RX ORDER — FLUCONAZOLE 150 MG/1
150 TABLET ORAL DAILY
Qty: 3 TABLET | Refills: 0 | Status: SHIPPED | OUTPATIENT
Start: 2020-04-16 | End: 2020-06-09

## 2020-04-16 ASSESSMENT — MIFFLIN-ST. JEOR: SCORE: 1188.35

## 2020-04-16 NOTE — NURSING NOTE
"Chief Complaint   Patient presents with     Gyn Exam     pap due--vagina is itchy--going on a few months--some discharge--needs refill on birth control       Initial BP (!) 150/78   Ht 1.575 m (5' 2\")   Wt 60.5 kg (133 lb 6.4 oz)   LMP 2020 (Exact Date)   BMI 24.40 kg/m   Estimated body mass index is 24.4 kg/m  as calculated from the following:    Height as of this encounter: 1.575 m (5' 2\").    Weight as of this encounter: 60.5 kg (133 lb 6.4 oz).  BP completed using cuff size: regular    Questioned patient about current smoking habits.  Pt. has never smoked.          The following HM Due: pap smear         "

## 2020-04-16 NOTE — PROGRESS NOTES
HPI:  Theresa Quarles is a 50 year old s.e.  female (The patient states her actual age is 52.  She was assigned an arbitrary birthday when she came to this country is an immigrant)   ,periodic abstinence for contraception who presents for an annual exam and pap.  She is seeing Dr. Smith for her ongoing diabetes.  I reviewed her recent hemoglobin A1c values.  The patient is using metformin and dietary supplements in an effort to control her diabetes.  I stressed the importance of tight blood sugar control.  The patient is also experiencing vaginal itching irritation and a white vaginal discharge that is been going on for the past several days.  No recent antibiotic use no infectious disease exposure.  No STD exposure.  She has not used any over-the-counter medications for this.  The patient states that she had an eye exam done 2 months ago denies foot ulceration and I reviewed her recent lipid panel results.  I reviewed the recommendations for testing.  I reviewed her initial elevated systolic blood pressure reading with a normal repeat value she will monitor this and call for elevations.  The patient is still having menses monthly without any midcycle bleeding or heavy flow or unscheduled bleeding.  I reviewed her previous work-up for this.  Self breast exam,  ACS screening mammogram recs, the use of 81 mg ASA to decrease the risk of heart disease, lipid screening, colon cancer screening recs and Dexa scan recs thoroughly reveiwed.      Past Medical History:   Diagnosis Date     Cervical high risk HPV (human papillomavirus) test positive 2018    See problem list     Fatty liver 2012     GBS (group B streptococcus) infection 2008     gestational diabetes      Gestational diabetes mellitus, antepartum 3/9/2009     Hyperlipidemia LDL goal < 130 2010     Liver function test abnormality 2010     Pregnancies, high-risk 3/19/2009     Past Surgical History:   Procedure Laterality Date      BIOPSY BREAST  2012    needle bx benign     HC EXCISE HAND/FOOT NEUROMA       HC REMOVAL OF TONSILS,14+ Y/O      Tonsils 14+y.o.     Family History   Problem Relation Age of Onset     Cerebrovascular Disease Father              Thyroid Disease Mother         s/p thyrodectomy     Cancer - colorectal Mother         rectal cancer     Social History     Socioeconomic History     Marital status:      Spouse name: Not on file     Number of children: Not on file     Years of education: Not on file     Highest education level: Not on file   Occupational History     Not on file   Social Needs     Financial resource strain: Not on file     Food insecurity     Worry: Not on file     Inability: Not on file     Transportation needs     Medical: Not on file     Non-medical: Not on file   Tobacco Use     Smoking status: Never Smoker     Smokeless tobacco: Never Used   Substance and Sexual Activity     Alcohol use: No     Drug use: No     Sexual activity: Yes     Partners: Male   Lifestyle     Physical activity     Days per week: Not on file     Minutes per session: Not on file     Stress: Not on file   Relationships     Social connections     Talks on phone: Not on file     Gets together: Not on file     Attends Shinto service: Not on file     Active member of club or organization: Not on file     Attends meetings of clubs or organizations: Not on file     Relationship status: Not on file     Intimate partner violence     Fear of current or ex partner: Not on file     Emotionally abused: Not on file     Physically abused: Not on file     Forced sexual activity: Not on file   Other Topics Concern     Parent/sibling w/ CABG, MI or angioplasty before 65F 55M? Not Asked   Social History Narrative     Not on file       Allergies:  Lisinopril    Current Outpatient Medications   Medication Sig Dispense Refill     ACE/ARB/ARNI NOT PRESCRIBED (INTENTIONAL) Please choose reason not prescribed, below       aspirin  "(ASA) 81 MG tablet Take 1 tablet (81 mg) by mouth daily 90 tablet 3     atorvastatin (LIPITOR) 10 MG tablet Take 1 tablet (10 mg) by mouth daily 90 tablet 3     benzonatate (TESSALON) 200 MG capsule Take 1 capsule (200 mg) by mouth 3 times daily as needed for cough 20 capsule 0     blood glucose monitoring (ACCU-CHEK BECKY PLUS) meter device kit Use to test blood sugars 3 times daily or as directed with appropriate meter test strips and lancets and ETOH swabs, # 1 month, refill times 11 1 kit 5     CINNAMON PO Take  by mouth.       glimepiride (AMARYL) 2 MG tablet Take 1 tablet (2 mg) by mouth every morning (before breakfast) 90 tablet 3     metFORMIN (GLUCOPHAGE) 500 MG tablet Take 1 tablet (500 mg) by mouth 2 times daily (with meals) 180 tablet 3     MULTIVITAMINS PO TABS ONE DAILY 100 Tab 3     omeprazole (PRILOSEC) 20 MG DR capsule Take 1 capsule (20 mg) by mouth daily 30 capsule 0     guaiFENesin-codeine (GUAIFENESIN AC) 100-10 MG/5ML syrup Take 5 mLs by mouth every 4 hours as needed for congestion (Patient not taking: Reported on 4/16/2020) 240 mL 0     norethindrone-ethinyl estradiol (JUNEL FE 1/20) 1-20 MG-MCG tablet Take 1 tablet by mouth daily 84 tablet 4       ROS: ROS: 10 point ROS neg other than the symptoms noted above in the HPI.    EXAM:  Vitals: /78   Ht 1.575 m (5' 2\")   Wt 60.5 kg (133 lb 6.4 oz)   LMP 03/24/2020 (Exact Date)   BMI 24.40 kg/m    BMI= Body mass index is 24.4 kg/m .  Constitutional: healthy, alert and no distress  Head: Normocephalic. No masses, lesions, tenderness or abnormalities  Neck: Neck supple. No adenopathy. Thyroid symmetric, normal size,, Carotids without bruits.  ENT: NEGATIVE for ear, mouth and throat problems  Breast:  breasts symmetric, no dominant or suspicious mass, no skin or nipple changes, no axillary adenopathy, unchanged from previous exam or self exam in taught and encouraged  Cardiovascular: negative, PMI normal. No lifts, heaves, or thrills. RRR. No " murmurs, clicks gallops or rub  Respiratory: negative, Percussion normal. Good diaphragmatic excursion. Lungs clear  Gastrointestinal: Abdomen soft, non-tender. BS normal. No masses, organomegaly  Genitourinary: Pelvic Exam:  External Genitalia:     Normal appearance for age, no discharge present, no tenderness present, no inflammatory lesions present, color normal  Vagina:     Normal vaginal vault without central or paravaginal defects, no discharge present, no inflammatory lesions present, no masses present  Bladder:     Nontender to palpation  Urethra:   Urethral Body:  Urethra palpation normal, urethra structural support normal   Urethral Meatus:  No erythema or lesions present  Cervix:     Appearance healthy, no lesions present, nontender to palpation, no bleeding present  Uterus:     Uterus: firm, normal sized and nontender, midplane in position.   Adnexa:     No adnexal tenderness present, no adnexal masses present  Perineum:     Perineum within normal limits, no evidence of trauma, no rashes or skin lesions present  Anus:     Anus within normal limits, no hemorrhoids present  Inguinal Lymph Nodes:     No lymphadenopathy present  Pubic Hair:     Normal pubic hair distribution for age  Genitalia and Groin:     No rashes present, no lesions present, no areas of discoloration, no masses present    Musculoskeletalextremities normal- no gross deformities noted, gait normal and normal muscle tone  Integument: no suspicious lesions or rashes  Neurologic: Gait normal. Reflexes normal and symmetric. Sensation grossly WNL.  Psychiatric: mentation appears normal and affect normal/bright  Hematologic/Lymphatic/Immunologic: Normal cervical lymph nodes     ASSESSMENT:/PLAN:  (Z01.419) Encounter for routine gynecological examination  (primary encounter diagnosis)  Comment: Otherwise unremarkable GYN exam today repeat Pap with cotesting done, past history reviewed  Plan: Pap imaged thin layer diagnostic with HPV          (select HPV order below), HPV High Risk Types         DNA Cervical, Wet prep        Done    (R87.810) Cervical high risk HPV (human papillomavirus) test positive  Comment: As above  Plan: Pap imaged thin layer diagnostic with HPV         (select HPV order below), HPV High Risk Types         DNA Cervical, Wet prep        Wet prep was done also to evaluate for vaginal irritation    (Z12.11) Special screening for malignant neoplasms, colon  Comment: Recommendations reviewed  Plan: Fecal colorectal cancer screen (FIT), Wet prep        Order placed    (N89.8) Vaginal itching  Comment: As above awaiting the results of the wet prep with appropriate therapy to follow  Plan: Wet prep        As above    (E11.00) Type 2 diabetes mellitus with hyperosmolarity without coma, without long-term current use of insulin (H)  Comment: As above  Plan: Recommend following up with Dr. Smith for this.  The patient's repeat blood pressure today was within an acceptable range.  She will monitor this and call for elevations      Kraig Arriaza M.D.        The wet prep has re for yeast.  I will send a prescription for Diflucan turned positive 1 tablet orally every other day for 3 doses to the listed pharmacy.  The patient will call if there is no improvement

## 2020-04-16 NOTE — RESULT ENCOUNTER NOTE
Please notify the patient of the positive wet prep for yeast.  I sent a prescription for Diflucan 150   milligrams orally daily for 3 doses to her   listed   Pharmacy   thank you   Kraig Arriaza MD FACOG

## 2020-04-16 NOTE — PATIENT INSTRUCTIONS
You can reach your [place] Care Team any time of the day by calling [phone]. This number will put you in touch with the 24 hour nurse line if the clinic is closed.    To contact your OB/GYN Station Coordinator/Surgery Scheduler please call [phone]. This is a direct number for your care team between 8 a.m. and 4 p.m. Monday through Friday.    [facility] is open for your convenience:  Monday through Friday 8 a.m. to 6 p.m.  Closed weekends and all major holidays.

## 2020-04-16 NOTE — LETTER
April 29, 2020    Theresa Quarles  [address] [place] [id number]    Dear ,  This letter is regarding your recent Pap smear (cervical cancer screening) and Human Papillomavirus (HPV) test.  We are happy to inform you that your Pap smear result is normal. Cervical cancer is closely linked with certain types of HPV. Your results showed no evidence of high-risk HPV.  We recommend you have your next PAP smear and HPV test in 3 years.  You will still need to return to the clinic every year for an annual exam and other preventive tests.  If you have additional questions regarding this result, please call our registered nurse, Viktoriya at [phone].  Sincerely,    Kraig Arriaza MD/michelle

## 2020-04-20 LAB
COPATH REPORT: NORMAL
PAP: NORMAL

## 2020-04-22 PROBLEM — R87.810 CERVICAL HIGH RISK HPV (HUMAN PAPILLOMAVIRUS) TEST POSITIVE: Status: ACTIVE | Noted: 2018-11-23

## 2020-04-22 LAB
FINAL DIAGNOSIS: NORMAL
HPV HR 12 DNA CVX QL NAA+PROBE: NEGATIVE
HPV16 DNA SPEC QL NAA+PROBE: NEGATIVE
HPV18 DNA SPEC QL NAA+PROBE: NEGATIVE
SPECIMEN DESCRIPTION: NORMAL
SPECIMEN SOURCE CVX/VAG CYTO: NORMAL

## 2020-04-27 DIAGNOSIS — Z12.11 SPECIAL SCREENING FOR MALIGNANT NEOPLASMS, COLON: ICD-10-CM

## 2020-04-27 PROCEDURE — 82274 ASSAY TEST FOR BLOOD FECAL: CPT | Performed by: OBSTETRICS & GYNECOLOGY

## 2020-04-27 NOTE — RESULT ENCOUNTER NOTE
In reviewing the results of her previous Paps and co-test I believe that it is safe for the patient to have a repeat Pap and cotesting 3 years with annual exams in the interval.  Please notify the patient

## 2020-04-30 LAB — HEMOCCULT STL QL IA: NEGATIVE

## 2020-05-01 DIAGNOSIS — N89.8 VAGINAL ITCHING: ICD-10-CM

## 2020-05-01 DIAGNOSIS — E11.9 TYPE 2 DIABETES MELLITUS WITHOUT COMPLICATION, WITHOUT LONG-TERM CURRENT USE OF INSULIN (H): ICD-10-CM

## 2020-05-01 NOTE — LETTER
[facility]  [address] [id number] [phone]            Theresa Quarles  [address]  [facility] [address]        May 4, 2020    Dear Theresa,    While refilling your prescription today, we noticed that you are due to have labs drawn.  We will refill your prescription for 30 days, but a follow-up appointment must be made before any additional refills can be approved.     Taking care of your health is important to us and we look forward to seeing you in the near future.  Please call us at [phone] or [phone] (or use [facility]) to schedule an appointment.     Please disregard this notice if you have already made an appointment.    Sincerely,        [facility]

## 2020-05-04 RX ORDER — FLUCONAZOLE 150 MG/1
TABLET ORAL
Qty: 3 TABLET | Refills: 0 | OUTPATIENT
Start: 2020-05-04

## 2020-05-04 RX ORDER — GLIMEPIRIDE 2 MG/1
TABLET ORAL
Qty: 90 TABLET | Refills: 3 | Status: SHIPPED | OUTPATIENT
Start: 2020-05-04 | End: 2021-06-14

## 2020-05-04 NOTE — TELEPHONE ENCOUNTER
I have filled medication the patient is due for A1c and lab work, please inform patient that orders have been placed and that she needs to get the labs done prior to the end of the month.

## 2020-05-04 NOTE — TELEPHONE ENCOUNTER
Routing refill request to provider for review/approval because:  Labs not current:  A1c, creat

## 2020-05-04 NOTE — TELEPHONE ENCOUNTER
Spoke with the pt, and she is not having vag discharge. She is having a little itching externally. I advised that she use OTC vagisil to see if that would help.     She will keep us updated.     Maria Esther RODRIGUEZ RN

## 2020-05-09 DIAGNOSIS — E11.9 TYPE 2 DIABETES MELLITUS WITHOUT COMPLICATION, WITHOUT LONG-TERM CURRENT USE OF INSULIN (H): ICD-10-CM

## 2020-05-09 LAB — HBA1C MFR BLD: 9.2 % (ref 0–5.6)

## 2020-05-09 PROCEDURE — 83036 HEMOGLOBIN GLYCOSYLATED A1C: CPT | Performed by: INTERNAL MEDICINE

## 2020-05-09 PROCEDURE — 36415 COLL VENOUS BLD VENIPUNCTURE: CPT | Performed by: INTERNAL MEDICINE

## 2020-06-01 ENCOUNTER — TELEPHONE (OUTPATIENT)
Dept: OBGYN | Facility: CLINIC | Age: 50
End: 2020-06-01

## 2020-06-01 DIAGNOSIS — N93.9 ABNORMAL UTERINE BLEEDING (AUB): ICD-10-CM

## 2020-06-01 RX ORDER — NORETHINDRONE ACETATE AND ETHINYL ESTRADIOL 1MG-20(21)
1 KIT ORAL DAILY
Qty: 84 TABLET | Refills: 4 | Status: SHIPPED | OUTPATIENT
Start: 2020-06-01 | End: 2020-08-18

## 2020-06-01 NOTE — TELEPHONE ENCOUNTER
Pt is calling. She ran out of her birth control pills last month and would like a refill. LMP 5/27/20.     Pt is aware that she would start the pill this upcoming Sunday, and will use condoms for the first cycle of the pills. Or advise if you would like her to start them today. Her period ended yesterday.    Please advise when rx is faxed.     Maria Esther RODRIGUEZ RN

## 2020-06-09 ENCOUNTER — VIRTUAL VISIT (OUTPATIENT)
Dept: INTERNAL MEDICINE | Facility: CLINIC | Age: 50
End: 2020-06-09
Payer: COMMERCIAL

## 2020-06-09 DIAGNOSIS — E78.5 HYPERLIPIDEMIA WITH TARGET LDL LESS THAN 130: ICD-10-CM

## 2020-06-09 DIAGNOSIS — E11.9 TYPE 2 DIABETES MELLITUS WITHOUT COMPLICATION, WITHOUT LONG-TERM CURRENT USE OF INSULIN (H): ICD-10-CM

## 2020-06-09 PROCEDURE — 99214 OFFICE O/P EST MOD 30 MIN: CPT | Mod: 95 | Performed by: INTERNAL MEDICINE

## 2020-06-09 RX ORDER — ACETAMINOPHEN 325 MG/1
325-650 TABLET ORAL EVERY 6 HOURS PRN
COMMUNITY
End: 2023-05-18

## 2020-06-09 RX ORDER — ATORVASTATIN CALCIUM 10 MG/1
10 TABLET, FILM COATED ORAL DAILY
Qty: 90 TABLET | Refills: 3 | Status: SHIPPED | OUTPATIENT
Start: 2020-06-09 | End: 2021-06-14

## 2020-06-09 NOTE — Clinical Note
A letter was written to the patient in regards to work status.  Can we please copy a letter and send it to patient and mail.

## 2020-06-09 NOTE — PROGRESS NOTES
"Theresa Quarles is a 50 year old female who is being evaluated via a billable telephone visit.      The patient has been notified of following:     \"This telephone visit will be conducted via a call between you and your physician/provider. We have found that certain health care needs can be provided without the need for a physical exam.  This service lets us provide the care you need with a short phone conversation.  If a prescription is necessary we can send it directly to your pharmacy.  If lab work is needed we can place an order for that and you can then stop by our lab to have the test done at a later time.    Telephone visits are billed at different rates depending on your insurance coverage. During this emergency period, for some insurers they may be billed the same as an in-person visit.  Please reach out to your insurance provider with any questions.    If during the course of the call the physician/provider feels a telephone visit is not appropriate, you will not be charged for this service.\"    Patient has given verbal consent for Telephone visit?  Yes    What phone number would you like to be contacted at? [phone]    How would you like to obtain your AVS? Renato Coates     Theresa Quarles is a 50 year old female who presents via phone visit today for the following health issues:    HPI     She states that at times she is not compliant with taking her medication on a regular basis and misses her evening doses.    Diabetes Follow-up      How often are you checking your blood sugar? Not at all    What concerns do you have today about your diabetes? Other: follow up a1c     Do you have any of these symptoms? (Select all that apply)  No numbness or tingling in feet.  No redness, sores or blisters on feet.  No complaints of excessive thirst.  No reports of blurry vision.  No significant changes to weight.      BP Readings from Last 2 Encounters:   04/16/20 138/78   03/11/20 118/74     Hemoglobin A1C " (%)   Date Value   2020 9.2 (H)   2019 9.0 (H)     LDL Cholesterol Calculated (mg/dL)   Date Value   2019 65   2018 64                 How many servings of fruits and vegetables do you eat daily?  2-3    On average, how many sweetened beverages do you drink each day (Examples: soda, juice, sweet tea, etc.  Do NOT count diet or artificially sweetened beverages)?   0    How many days per week do you exercise enough to make your heart beat faster? 5    How many minutes a day do you exercise enough to make your heart beat faster? 30 - 60    How many days per week do you miss taking your medication? 0     Other concerns:  1. Patient requesting letter to work from home stating she is able to work from home due to being higher risk with covid-19.       Patient Active Problem List   Diagnosis     Gestational diabetes mellitus, antepartum     Liver function test abnormality     Hyperlipidemia with target LDL less than 130     Butler's metatarsalgia, neuralgia, or neuroma     Foot joint pain     Abnormality of gait     Other postprocedural status(V45.89)     Other peripheral enthesopathies     Fatty liver     Pyelonephritis     General counseling for prescription of oral contraceptives     Type 2 diabetes mellitus without complication, without long-term current use of insulin (H)     Abnormal uterine bleeding (AUB)     Cervical high risk HPV (human papillomavirus) test positive     Past Surgical History:   Procedure Laterality Date     BIOPSY BREAST  2012    needle bx benign     HC EXCISE HAND/FOOT NEUROMA       HC REMOVAL OF TONSILS,14+ Y/O      Tonsils 14+y.o.       Social History     Tobacco Use     Smoking status: Never Smoker     Smokeless tobacco: Never Used   Substance Use Topics     Alcohol use: No     Family History   Problem Relation Age of Onset     Cerebrovascular Disease Father              Thyroid Disease Mother         s/p thyrodectomy     Cancer - colorectal Mother          rectal cancer         Current Outpatient Medications   Medication Sig Dispense Refill     ACE/ARB/ARNI NOT PRESCRIBED (INTENTIONAL) Please choose reason not prescribed, below       aspirin (ASA) 81 MG tablet Take 1 tablet (81 mg) by mouth daily 90 tablet 3     atorvastatin (LIPITOR) 10 MG tablet Take 1 tablet (10 mg) by mouth daily 90 tablet 3     blood glucose monitoring (ACCU-CHEK BECKY PLUS) meter device kit Use to test blood sugars 3 times daily or as directed with appropriate meter test strips and lancets and ETOH swabs, # 1 month, refill times 11 1 kit 5     CINNAMON PO Take  by mouth.       glimepiride (AMARYL) 2 MG tablet TAKE 1 TABLET EVERY MORNINGBEFORE BREAKFAST 90 tablet 3     metFORMIN (GLUCOPHAGE) 500 MG tablet Take 1 tablet (500 mg) by mouth 2 times daily (with meals) 180 tablet 3     MULTIVITAMINS PO TABS ONE DAILY 100 Tab 3     norethindrone-ethinyl estradiol (JUNEL FE 1/20) 1-20 MG-MCG tablet Take 1 tablet by mouth daily 84 tablet 4     omeprazole (PRILOSEC) 20 MG DR capsule Take 1 capsule (20 mg) by mouth daily 30 capsule 0     Allergies   Allergen Reactions     Lisinopril Hives     Noted angioedema     Recent Labs   Lab Test 05/09/20  1008 02/25/20  1034 11/04/19  0943 09/07/19  0947 03/23/19  0931 09/24/18  0929 08/09/18  0943   A1C 9.2*  --  9.0* 7.8* 7.7* 6.6* 7.1*   LDL  --   --   --  65  --  64 112*   HDL  --   --   --  49*  --  43* 45*   TRIG  --   --   --  220*  --  130 324*   ALT  --  51*  --   --  27 59* 67*   CR  --  0.44*  --  0.53  --   --  0.50*   GFRESTIMATED  --  >90  --  >90  --   --  >90   GFRESTBLACK  --  >90  --  >90  --   --  >90   POTASSIUM  --  4.0  --  3.8  --   --  3.7   TSH  --   --  4.56* 5.41*  --   --   --       BP Readings from Last 3 Encounters:   04/16/20 138/78   03/11/20 118/74   03/04/20 (!) 146/86    Wt Readings from Last 3 Encounters:   04/16/20 60.5 kg (133 lb 6.4 oz)   03/11/20 60.1 kg (132 lb 6.4 oz)   03/04/20 60.8 kg (134 lb)            Reviewed and  updated as needed this visit by Provider         Review of Systems   CONSTITUTIONAL: NEGATIVE for fever, chills, change in weight  ENT/MOUTH: NEGATIVE for ear, mouth and throat problems  RESP: NEGATIVE for significant cough or SOB  CV: NEGATIVE for chest pain, palpitations or peripheral edema  GI: NEGATIVE for nausea, abdominal pain, heartburn, or change in bowel habits  : NEGATIVE for frequency, dysuria, or hematuria  MUSCULOSKELETAL: NEGATIVE for significant arthralgias or myalgia  NEURO: NEGATIVE for weakness, dizziness or paresthesias  ENDOCRINE: NEGATIVE for temperature intolerance, skin/hair changes  HEME: NEGATIVE for bleeding problems  PSYCHIATRIC: NEGATIVE for changes in mood or affect       Objective   Reported vitals:  There were no vitals taken for this visit.   healthy, alert and no distress  PSYCH: Alert and oriented times 3; coherent speech, normal   rate and volume, able to articulate logical thoughts, able   to abstract reason, no tangential thoughts, no hallucinations   or delusions  Her affect is normal  RESP: No cough, no audible wheezing, able to talk in full sentences  Remainder of exam unable to be completed due to telephone visits    LDL Cholesterol Calculated   Date Value Ref Range Status   09/07/2019 65 <100 mg/dL Final     Comment:     Desirable:       <100 mg/dl     Lab Results   Component Value Date    A1C 9.2 05/09/2020    A1C 9.0 11/04/2019    A1C 7.8 09/07/2019    A1C 7.7 03/23/2019    A1C 6.6 09/24/2018           Assessment/Plan:    1. Hyperlipidemia with target LDL less than 130  Continuing with current LDL reduction at goal.  - atorvastatin (LIPITOR) 10 MG tablet; Take 1 tablet (10 mg) by mouth daily  Dispense: 90 tablet; Refill: 3    2. Type 2 diabetes mellitus without complication, without long-term current use of insulin (H)  Advise increasing metformin to 2 tablets or 1000 mg twice daily and encourage compliance.  Offered diabetes education but patient declined.  Suggest  recheck A1c 3 months.  - metFORMIN (GLUCOPHAGE) 500 MG tablet; Take 2 tablets (1,000 mg) by mouth 2 times daily (with meals)  Dispense: 360 tablet; Refill: 3  - Hemoglobin A1c; Future    Please note a letter was sent to the patient in regards to the need for working at home remotely.    Return in about 3 months (around 9/9/2020) for Lab Work appointment.    Phone call duration:  14 minutes    Arsenio Smith MD

## 2020-06-09 NOTE — LETTER
To whom it concerns:    Due to Theresa Quarles's underlying health issues she is at a slightly higher risk for the Covid virus.  Please allow her the opportunity to work remotely from home until formal restrictions are lifted.    Sincerely;    Arsenio Smith MD

## 2020-06-16 ENCOUNTER — TELEPHONE (OUTPATIENT)
Dept: INTERNAL MEDICINE | Facility: CLINIC | Age: 50
End: 2020-06-16

## 2020-06-16 NOTE — TELEPHONE ENCOUNTER
Reason for Call:  Other     Detailed comments: Patient is requesting a work note from doctor stating she is high risk group because of her diabetes for Covid19. Any question, please call her.    Phone Number Patient can be reached at: Home number on file [phone] (home)    Best Time: anytime    Can we leave a detailed message on this number? YES    Call taken on 6/16/2020 at 3:56 PM by NALINI LEWIS

## 2020-06-17 NOTE — TELEPHONE ENCOUNTER
We can write a note for the patient that states that she has diabetes and this places her at a slightly higher risk for COVID but the diabetes itself does not mean that she cannot work.  If patient still wants a note with statement as such I would suggest setting up a virtual phone visit to discuss.

## 2020-06-18 NOTE — TELEPHONE ENCOUNTER
Patient is working from home and wants to continue to do so and needs a letter for work in order to do so. Scheduled her for telephone visit on Monday, 6/22 at 9AM to discuss.    TAMMY Neely

## 2020-06-29 ENCOUNTER — VIRTUAL VISIT (OUTPATIENT)
Dept: INTERNAL MEDICINE | Facility: CLINIC | Age: 50
End: 2020-06-29
Payer: COMMERCIAL

## 2020-06-29 DIAGNOSIS — E11.9 TYPE 2 DIABETES MELLITUS WITHOUT COMPLICATION, WITHOUT LONG-TERM CURRENT USE OF INSULIN (H): Primary | ICD-10-CM

## 2020-06-29 PROCEDURE — 99214 OFFICE O/P EST MOD 30 MIN: CPT | Mod: 95 | Performed by: INTERNAL MEDICINE

## 2020-06-29 NOTE — LETTER
[facility]  [address] [address]  [phone]      6/29/2020       Theresa Quarles  [address] [place] [facility] [facility] [address]        To whom it concerns,    Please note that Theresa Quarles   is a patient of mine who has a history of underlying diabetes.  This places her at slightly higher risk for the coronavirus.  Please allow her the convenience of working virtually to lower her risk.    Sincerely,      Arsenio Smith MD  Internal Medicine

## 2020-06-29 NOTE — PROGRESS NOTES
"Theresa Quarles is a 50 year old female who is being evaluated via a billable telephone visit.      The patient has been notified of following:     \"This telephone visit will be conducted via a call between you and your physician/provider. We have found that certain health care needs can be provided without the need for a physical exam.  This service lets us provide the care you need with a short phone conversation.  If a prescription is necessary we can send it directly to your pharmacy.  If lab work is needed we can place an order for that and you can then stop by our lab to have the test done at a later time.    Telephone visits are billed at different rates depending on your insurance coverage. During this emergency period, for some insurers they may be billed the same as an in-person visit.  Please reach out to your insurance provider with any questions.    If during the course of the call the physician/provider feels a telephone visit is not appropriate, you will not be charged for this service.\"    Patient has given verbal consent for Telephone visit?  Yes    What phone number would you like to be contacted at? [phone]    How would you like to obtain your AVS? Bkt    Subjective     Theresa Quarles is a 50 year old female who presents via phone visit today for the following health issues:    HPI  Pt would like a note/letter for work stating higher risk for COVID19.  Would like to continue to work from home         Patient Active Problem List   Diagnosis     Gestational diabetes mellitus, antepartum     Liver function test abnormality     Hyperlipidemia with target LDL less than 130     Butler's metatarsalgia, neuralgia, or neuroma     Foot joint pain     Abnormality of gait     Other postprocedural status(V45.89)     Other peripheral enthesopathies     Fatty liver     Pyelonephritis     General counseling for prescription of oral contraceptives     Type 2 diabetes mellitus without complication, without " long-term current use of insulin (H)     Abnormal uterine bleeding (AUB)     Cervical high risk HPV (human papillomavirus) test positive     Past Surgical History:   Procedure Laterality Date     BIOPSY BREAST  2012    needle bx benign     HC EXCISE HAND/FOOT NEUROMA       HC REMOVAL OF TONSILS,12+ Y/O  2001    Tonsils 14+y.o.       Social History     Tobacco Use     Smoking status: Never Smoker     Smokeless tobacco: Never Used   Substance Use Topics     Alcohol use: No     Family History   Problem Relation Age of Onset     Cerebrovascular Disease Father              Thyroid Disease Mother         s/p thyrodectomy     Cancer - colorectal Mother         rectal cancer         Current Outpatient Medications   Medication Sig Dispense Refill     ACE/ARB/ARNI NOT PRESCRIBED (INTENTIONAL) Please choose reason not prescribed, below       atorvastatin (LIPITOR) 10 MG tablet Take 1 tablet (10 mg) by mouth daily 90 tablet 3     blood glucose monitoring (ACCU-CHEK BECKY PLUS) meter device kit Use to test blood sugars 3 times daily or as directed with appropriate meter test strips and lancets and ETOH swabs, # 1 month, refill times 11 1 kit 5     CINNAMON PO Take  by mouth.       glimepiride (AMARYL) 2 MG tablet TAKE 1 TABLET EVERY MORNINGBEFORE BREAKFAST 90 tablet 3     metFORMIN (GLUCOPHAGE) 500 MG tablet Take 2 tablets (1,000 mg) by mouth 2 times daily (with meals) 360 tablet 3     MULTIVITAMINS PO TABS ONE DAILY 100 Tab 3     norethindrone-ethinyl estradiol (JUNEL FE 1/20) 1-20 MG-MCG tablet Take 1 tablet by mouth daily 84 tablet 4     omeprazole (PRILOSEC) 20 MG DR capsule Take 1 capsule (20 mg) by mouth daily 30 capsule 0     acetaminophen (TYLENOL) 325 MG tablet Take 325-650 mg by mouth every 6 hours as needed for mild pain       aspirin (ASA) 81 MG tablet Take 1 tablet (81 mg) by mouth daily (Patient not taking: Reported on 2020) 90 tablet 3     Allergies   Allergen Reactions     Lisinopril Hives     Noted  angioedema     Recent Labs   Lab Test 05/09/20  1008 02/25/20  1034 11/04/19  0943 09/07/19  0947 03/23/19  0931 09/24/18  0929 08/09/18  0943   A1C 9.2*  --  9.0* 7.8* 7.7* 6.6* 7.1*   LDL  --   --   --  65  --  64 112*   HDL  --   --   --  49*  --  43* 45*   TRIG  --   --   --  220*  --  130 324*   ALT  --  51*  --   --  27 59* 67*   CR  --  0.44*  --  0.53  --   --  0.50*   GFRESTIMATED  --  >90  --  >90  --   --  >90   GFRESTBLACK  --  >90  --  >90  --   --  >90   POTASSIUM  --  4.0  --  3.8  --   --  3.7   TSH  --   --  4.56* 5.41*  --   --   --       BP Readings from Last 3 Encounters:   04/16/20 138/78   03/11/20 118/74   03/04/20 (!) 146/86    Wt Readings from Last 3 Encounters:   04/16/20 60.5 kg (133 lb 6.4 oz)   03/11/20 60.1 kg (132 lb 6.4 oz)   03/04/20 60.8 kg (134 lb)                    Reviewed and updated as needed this visit by Provider         Review of Systems   CONSTITUTIONAL: NEGATIVE for fever, chills, change in weight  ENT/MOUTH: NEGATIVE for ear, mouth and throat problems  RESP: NEGATIVE for significant cough or SOB  CV: NEGATIVE for chest pain, palpitations or peripheral edema  GI: NEGATIVE for nausea, abdominal pain, heartburn, or change in bowel habits  : NEGATIVE for frequency, dysuria, or hematuria  MUSCULOSKELETAL: NEGATIVE for significant arthralgias or myalgia  NEURO: NEGATIVE for weakness, dizziness or paresthesias  HEME: NEGATIVE for bleeding problems  PSYCHIATRIC: NEGATIVE for changes in mood or affect       Objective   Reported vitals:  There were no vitals taken for this visit.   healthy, alert and no distress  PSYCH: Alert and oriented times 3; coherent speech, normal   rate and volume, able to articulate logical thoughts, able   to abstract reason, no tangential thoughts, no hallucinations   or delusions  Her affect is normal  RESP: No cough, no audible wheezing, able to talk in full sentences  Remainder of exam unable to be completed due to telephone visits    Lab Results    Component Value Date    A1C 9.2 05/09/2020    A1C 9.0 11/04/2019    A1C 7.8 09/07/2019    A1C 7.7 03/23/2019    A1C 6.6 09/24/2018             Assessment/Plan:    1. Type 2 diabetes mellitus without complication, without long-term current use of insulin (H)  Discussed more aggressive dietary and interventional care for her diabetes.  Suggested diabetes referral.  Letter sent to patient for ability to work virtually from home and advised to follow-up for labs as scheduled  - AMBULATORY ADULT DIABETES EDUCATOR REFERRAL; Future    No follow-ups on file.      Phone call duration:  14 minutes    Arsenio Smith MD

## 2020-07-28 ENCOUNTER — TELEPHONE (OUTPATIENT)
Dept: INTERNAL MEDICINE | Facility: CLINIC | Age: 50
End: 2020-07-28

## 2020-07-28 NOTE — TELEPHONE ENCOUNTER
Diabetes Education Scheduling Outreach #1:    Call to patient to schedule. Left message with phone number to call to schedule.    Plan for 2nd outreach attempt within 1 week.    Valerie Maynard  [facility]  Diabetes and Nutrition Scheduling

## 2020-08-18 ENCOUNTER — TELEPHONE (OUTPATIENT)
Dept: INTERNAL MEDICINE | Facility: CLINIC | Age: 50
End: 2020-08-18

## 2020-08-18 DIAGNOSIS — N93.9 ABNORMAL UTERINE BLEEDING (AUB): ICD-10-CM

## 2020-08-18 RX ORDER — NORETHINDRONE ACETATE AND ETHINYL ESTRADIOL 1MG-20(21)
1 KIT ORAL DAILY
Qty: 84 TABLET | Refills: 2 | Status: SHIPPED | OUTPATIENT
Start: 2020-08-18 | End: 2021-04-14

## 2020-08-18 NOTE — TELEPHONE ENCOUNTER
Reason for Call:  Other call back    Detailed comments: Theresa is wanting to get a refill on her birth control medication. However, the medication she is asking for Aurovila (?), is not on her medication list.     Please return a call to help her determine her medication.     Phone Number Patient can be reached at: Cell number on file:    Telephone Information:   Mobile [phone]       Best Time: any    Can we leave a detailed message on this number? YES    Call taken on 8/18/2020 at 2:44 PM by Gay España

## 2020-09-05 DIAGNOSIS — E11.9 TYPE 2 DIABETES MELLITUS WITHOUT COMPLICATION, WITHOUT LONG-TERM CURRENT USE OF INSULIN (H): ICD-10-CM

## 2020-09-05 LAB — HBA1C MFR BLD: 7.7 % (ref 0–5.6)

## 2020-09-05 PROCEDURE — 83036 HEMOGLOBIN GLYCOSYLATED A1C: CPT | Performed by: INTERNAL MEDICINE

## 2020-09-05 PROCEDURE — 36415 COLL VENOUS BLD VENIPUNCTURE: CPT | Performed by: INTERNAL MEDICINE

## 2020-11-02 NOTE — TELEPHONE ENCOUNTER
Diabetes Education Scheduling Outreach #2:    Call to patient to schedule. Patient declined to schedule.    Valerie Maynard  [facility]  Diabetes and Nutrition Scheduling

## 2020-12-24 ENCOUNTER — ANCILLARY PROCEDURE (OUTPATIENT)
Dept: MAMMOGRAPHY | Facility: CLINIC | Age: 50
End: 2020-12-24
Payer: COMMERCIAL

## 2020-12-24 DIAGNOSIS — Z12.31 VISIT FOR SCREENING MAMMOGRAM: ICD-10-CM

## 2020-12-24 PROCEDURE — 77067 SCR MAMMO BI INCL CAD: CPT | Mod: TC | Performed by: RADIOLOGY

## 2021-04-04 ENCOUNTER — HEALTH MAINTENANCE LETTER (OUTPATIENT)
Age: 51
End: 2021-04-04

## 2021-04-14 DIAGNOSIS — N93.9 ABNORMAL UTERINE BLEEDING (AUB): ICD-10-CM

## 2021-04-14 RX ORDER — NORETHINDRONE ACETATE AND ETHINYL ESTRADIOL AND FERROUS FUMARATE 1MG-20(21)
KIT ORAL
Qty: 84 TABLET | Refills: 0 | Status: SHIPPED | OUTPATIENT
Start: 2021-04-14 | End: 2021-06-14

## 2021-05-13 ENCOUNTER — OFFICE VISIT (OUTPATIENT)
Dept: OBGYN | Facility: CLINIC | Age: 51
End: 2021-05-13
Payer: COMMERCIAL

## 2021-05-13 VITALS — BODY MASS INDEX: 23.78 KG/M2 | DIASTOLIC BLOOD PRESSURE: 76 MMHG | WEIGHT: 130 LBS | SYSTOLIC BLOOD PRESSURE: 130 MMHG

## 2021-05-13 DIAGNOSIS — Z12.4 SCREENING FOR MALIGNANT NEOPLASM OF CERVIX: ICD-10-CM

## 2021-05-13 DIAGNOSIS — Z01.419 ENCOUNTER FOR GYNECOLOGICAL EXAMINATION WITHOUT ABNORMAL FINDING: ICD-10-CM

## 2021-05-13 DIAGNOSIS — Z12.11 COLON CANCER SCREENING: Primary | ICD-10-CM

## 2021-05-13 PROCEDURE — G0145 SCR C/V CYTO,THINLAYER,RESCR: HCPCS | Performed by: OBSTETRICS & GYNECOLOGY

## 2021-05-13 PROCEDURE — 87624 HPV HI-RISK TYP POOLED RSLT: CPT | Performed by: OBSTETRICS & GYNECOLOGY

## 2021-05-13 PROCEDURE — 99396 PREV VISIT EST AGE 40-64: CPT | Performed by: OBSTETRICS & GYNECOLOGY

## 2021-05-13 NOTE — PROGRESS NOTES
HPI:  Theresa Quarles is a 51 year old s.e.  female  ,oral contraceptives for contraception who presents for an annual exam and pap.  She states that her true actual age is 54.  [place] she [place] the [place] she was given a birthdate of 1970 but states that in actuality she is older.  The birth control pill works well for her.  Last month her cycle was very light.  The month prior was heavier.  She denies any postcoital or abnormal uterine bleeding. Instrucitons and indications for backup were thoroughly rev.  All her ?'s were answered.  She had a normal mammogram on 2020.  I reviewed her Pap smear history.  She had a fit test done last year.  Colon cancer screening recommendations reviewed.  She like to repeat a fit test this year-  Self breast exam,  ACS screening mammogram recs, the use of 81 mg ASA to decrease the risk of heart disease, lipid screening, colon cancer screening recs and Dexa scan recs thoroughly reveiwed.      Past Medical History:   Diagnosis Date     Cervical high risk HPV (human papillomavirus) test positive 2018    See problem list     Fatty liver 2012     GBS (group B streptococcus) infection 2008     gestational diabetes      Gestational diabetes mellitus, antepartum 3/9/2009     Hyperlipidemia LDL goal < 130 2010     Liver function test abnormality 2010     Pregnancies, high-risk 3/19/2009     Past Surgical History:   Procedure Laterality Date     BIOPSY BREAST  2012    needle bx benign     HC EXCISE HAND/FOOT NEUROMA       HC REMOVAL OF TONSILS,12+ Y/O      Tonsils 12+y.o.     Family History   Problem Relation Age of Onset     Cerebrovascular Disease Father              Thyroid Disease Mother         s/p thyrodectomy     Cancer - colorectal Mother         rectal cancer     Social History     Socioeconomic History     Marital status:      Spouse name: Not on file     Number of children: Not on file     Years of education: Not  on file     Highest education level: Not on file   Occupational History     Not on file   Social Needs     Financial resource strain: Not on file     Food insecurity     Worry: Not on file     Inability: Not on file     Transportation needs     Medical: Not on file     Non-medical: Not on file   Tobacco Use     Smoking status: Never Smoker     Smokeless tobacco: Never Used   Substance and Sexual Activity     Alcohol use: No     Drug use: No     Sexual activity: Yes     Partners: Male   Lifestyle     Physical activity     Days per week: Not on file     Minutes per session: Not on file     Stress: Not on file   Relationships     Social connections     Talks on phone: Not on file     Gets together: Not on file     Attends [employer] service: Not on file     Active member of club or organization: Not on file     Attends meetings of clubs or organizations: Not on file     Relationship status: Not on file     Intimate partner violence     Fear of current or ex partner: Not on file     Emotionally abused: Not on file     Physically abused: Not on file     Forced sexual activity: Not on file   Other Topics Concern     Parent/sibling w/ CABG, MI or angioplasty before 65F 55M? Not Asked   Social History Narrative     Not on file       Allergies:  Lisinopril    Current Outpatient Medications   Medication Sig Dispense Refill     ACE/ARB/ARNI NOT PRESCRIBED (INTENTIONAL) Please choose reason not prescribed, below       acetaminophen (TYLENOL) 325 MG tablet Take 325-650 mg by mouth every 6 hours as needed for mild pain       aspirin (ASA) 81 MG tablet Take 1 tablet (81 mg) by mouth daily 90 tablet 3     atorvastatin (LIPITOR) 10 MG tablet Take 1 tablet (10 mg) by mouth daily 90 tablet 3     blood glucose monitoring (ACCU-CHEK BECKY PLUS) meter device kit Use to test blood sugars 3 times daily or as directed with appropriate meter test strips and lancets and ETOH swabs, # 1 month, refill times 11 1 kit 5     CINNAMON PO Take  by  mouth.       glimepiride (AMARYL) 2 MG tablet TAKE 1 TABLET EVERY MORNINGBEFORE BREAKFAST 90 tablet 3     JUNEL FE 1/20 1-20 MG-MCG tablet TAKE 1 TABLET DAILY 84 tablet 0     metFORMIN (GLUCOPHAGE) 500 MG tablet Take 2 tablets (1,000 mg) by mouth 2 times daily (with meals) 360 tablet 3     MULTIVITAMINS PO TABS ONE DAILY 100 Tab 3     omeprazole (PRILOSEC) 20 MG DR capsule Take 1 capsule (20 mg) by mouth daily 30 capsule 0       ROS: ROS: 10 point ROS neg other than the symptoms noted above in the HPI.    EXAM:  Vitals: /76   Wt 59 kg (130 lb)   BMI 23.78 kg/m    BMI= Body mass index is 23.78 kg/m .  Constitutional: healthy, alert and no distress  Head: Normocephalic. No masses, lesions, tenderness or abnormalities  Neck: Neck supple. No adenopathy. Thyroid symmetric, normal size,, Carotids without bruits.  ENT: NEGATIVE for ear, mouth and throat problems  Breast:  breasts symmetric, no dominant or suspicious mass, no skin or nipple changes, no axillary adenopathy, unchanged from previous exam or self exam in taught and encouraged  Cardiovascular: negative, PMI normal. No lifts, heaves, or thrills. RRR. No murmurs, clicks gallops or rub  Respiratory: negative, Percussion normal. Good diaphragmatic excursion. Lungs clear  Gastrointestinal: Abdomen soft, non-tender. BS normal. No masses, organomegaly  Genitourinary: Pelvic Exam:  External Genitalia:     Normal appearance for age, no discharge present, no tenderness present, no inflammatory lesions present, color normal  Vagina:     Normal vaginal vault without central or paravaginal defects, no discharge present, no inflammatory lesions present, no masses present  Bladder:     Nontender to palpation  Urethra:   Urethral Body:  Urethra palpation normal, urethra structural support normal   Urethral Meatus:  No erythema or lesions present  Cervix:     Appearance healthy, no lesions present, nontender to palpation, no bleeding present  Uterus:     Uterus: firm,  normal sized and nontender, midplane in position.   Adnexa:     No adnexal tenderness present, no adnexal masses present  Perineum:     Perineum within normal limits, no evidence of trauma, no rashes or skin lesions present  Anus:     Anus within normal limits, no hemorrhoids present  Inguinal Lymph Nodes:     No lymphadenopathy present  Pubic Hair:     Normal pubic hair distribution for age  Genitalia and Groin:     No rashes present, no lesions present, no areas of discoloration, no masses present    Musculoskeletalextremities normal- no gross deformities noted, gait normal and normal muscle tone  Integument: no suspicious lesions or rashes  Neurologic: Gait normal. Reflexes normal and symmetric. Sensation grossly WNL.  Psychiatric: mentation appears normal and affect normal/bright  Hematologic/Lymphatic/Immunologic: Normal cervical lymph nodes     ASSESSMENT:/PLAN:  (Z12.11) Colon cancer screening  (primary encounter diagnosis)  Comment: Recommendations and past screening results reviewed  Plan: Fecal colorectal cancer screen (FIT)        Ordered    (Z01.419) Encounter for gynecological examination without abnormal finding  Comment: Otherwise normal GYN exam.  The patient has no history of hypertension or absolute contraindications to OCPs.  She like to continue with them  Plan: Return 1 year as needed      Kraig Arriaza M.D.

## 2021-05-13 NOTE — NURSING NOTE
"Chief Complaint   Patient presents with     Physical       Initial /76   Wt 59 kg (130 lb)   BMI 23.78 kg/m   Estimated body mass index is 23.78 kg/m  as calculated from the following:    Height as of 20: 1.575 m (5' 2\").    Weight as of this encounter: 59 kg (130 lb).  BP completed using cuff size: regular    Questioned patient about current smoking habits.  Pt. has never smoked.          The following HM Due: pap smear      The following patient reported/Care Every where data was sent to:  P ABSTRACT QUALITY INITIATIVES [[id number]]        Charlee Cabrera CMA                 "

## 2021-05-18 LAB
COPATH REPORT: NORMAL
PAP: NORMAL

## 2021-05-19 DIAGNOSIS — Z12.11 COLON CANCER SCREENING: ICD-10-CM

## 2021-05-19 LAB
FINAL DIAGNOSIS: ABNORMAL
HPV HR 12 DNA CVX QL NAA+PROBE: NEGATIVE
HPV16 DNA SPEC QL NAA+PROBE: POSITIVE
HPV18 DNA SPEC QL NAA+PROBE: NEGATIVE
SPECIMEN DESCRIPTION: ABNORMAL
SPECIMEN SOURCE CVX/VAG CYTO: ABNORMAL

## 2021-05-19 PROCEDURE — 82274 ASSAY TEST FOR BLOOD FECAL: CPT | Performed by: OBSTETRICS & GYNECOLOGY

## 2021-05-20 ENCOUNTER — PATIENT OUTREACH (OUTPATIENT)
Dept: OBGYN | Facility: CLINIC | Age: 51
End: 2021-05-20

## 2021-05-20 DIAGNOSIS — R87.810 CERVICAL HIGH RISK HPV (HUMAN PAPILLOMAVIRUS) TEST POSITIVE: ICD-10-CM

## 2021-05-22 DIAGNOSIS — E11.9 TYPE 2 DIABETES MELLITUS WITHOUT COMPLICATION, WITHOUT LONG-TERM CURRENT USE OF INSULIN (H): ICD-10-CM

## 2021-05-22 LAB — HEMOCCULT STL QL IA: NEGATIVE

## 2021-05-22 NOTE — LETTER
[facility]  [address] [place] [address]  [phone]      5/26/2021       Theresa Quarles  [address] [place] [id number]        Dear Theresa,  You are due for a follow up appointment with Dr. Smith regarding your prescription for METFORMIN TAB 500MG Please call to schedule, [phone].   Your prescriptions will not be refilled until after your follow up with Dr.. Smith.       Sincerely,      Arsenio Smith MD/Bettie Serna, [facility]  Internal Medicine

## 2021-05-24 NOTE — RESULT ENCOUNTER NOTE
Theresa, all your results are within normal limits.  This satisfies the screening recommendation for colon cancer screening for 1 year.  Please let me know if have any questions  Thank you  Kraig Arriaza M.D.

## 2021-05-25 NOTE — TELEPHONE ENCOUNTER
Creatinine   Date Value Ref Range Status   02/25/2020 0.44 (L) 0.52 - 1.04 mg/dL Final     Hemoglobin A1C   Date Value Ref Range Status   09/05/2020 7.7 (H) 0 - 5.6 % Final     Comment:     Normal <5.7% Prediabetes 5.7-6.4%  Diabetes 6.5% or higher - adopted from ADA   consensus guidelines.       Labs out of date.    Not RN protocol.    Please refill as appropriate.    Chelsea Rose RN  [facility]

## 2021-06-01 ENCOUNTER — TRANSFERRED RECORDS (OUTPATIENT)
Dept: HEALTH INFORMATION MANAGEMENT | Facility: CLINIC | Age: 51
End: 2021-06-01

## 2021-06-01 LAB — RETINOPATHY: NEGATIVE

## 2021-06-14 ENCOUNTER — OFFICE VISIT (OUTPATIENT)
Dept: INTERNAL MEDICINE | Facility: CLINIC | Age: 51
End: 2021-06-14
Payer: COMMERCIAL

## 2021-06-14 VITALS
HEART RATE: 84 BPM | OXYGEN SATURATION: 100 % | BODY MASS INDEX: 23.79 KG/M2 | WEIGHT: 129.3 LBS | DIASTOLIC BLOOD PRESSURE: 82 MMHG | TEMPERATURE: 98.1 F | SYSTOLIC BLOOD PRESSURE: 132 MMHG | RESPIRATION RATE: 14 BRPM | HEIGHT: 62 IN

## 2021-06-14 DIAGNOSIS — N93.9 ABNORMAL UTERINE BLEEDING (AUB): ICD-10-CM

## 2021-06-14 DIAGNOSIS — E11.9 TYPE 2 DIABETES MELLITUS WITHOUT COMPLICATION, WITHOUT LONG-TERM CURRENT USE OF INSULIN (H): ICD-10-CM

## 2021-06-14 DIAGNOSIS — Z12.11 COLON CANCER SCREENING: ICD-10-CM

## 2021-06-14 DIAGNOSIS — E78.5 HYPERLIPIDEMIA LDL GOAL <100: ICD-10-CM

## 2021-06-14 DIAGNOSIS — Z00.00 ROUTINE GENERAL MEDICAL EXAMINATION AT A HEALTH CARE FACILITY: Primary | ICD-10-CM

## 2021-06-14 LAB
ALBUMIN SERPL-MCNC: 3.6 G/DL (ref 3.4–5)
ALP SERPL-CCNC: 65 U/L (ref 40–150)
ALT SERPL W P-5'-P-CCNC: 24 U/L (ref 0–50)
ANION GAP SERPL CALCULATED.3IONS-SCNC: 6 MMOL/L (ref 3–14)
AST SERPL W P-5'-P-CCNC: 20 U/L (ref 0–45)
BILIRUB SERPL-MCNC: 0.5 MG/DL (ref 0.2–1.3)
BUN SERPL-MCNC: 9 MG/DL (ref 7–30)
CALCIUM SERPL-MCNC: 8.9 MG/DL (ref 8.5–10.1)
CHLORIDE SERPL-SCNC: 102 MMOL/L (ref 94–109)
CHOLEST SERPL-MCNC: 146 MG/DL
CO2 SERPL-SCNC: 27 MMOL/L (ref 20–32)
CREAT SERPL-MCNC: 0.55 MG/DL (ref 0.52–1.04)
CREAT UR-MCNC: 128 MG/DL
GFR SERPL CREATININE-BSD FRML MDRD: >90 ML/MIN/{1.73_M2}
GLUCOSE SERPL-MCNC: 104 MG/DL (ref 70–99)
HBA1C MFR BLD: 7.7 % (ref 0–5.6)
HDLC SERPL-MCNC: 40 MG/DL
HGB BLD-MCNC: 11.2 G/DL (ref 11.7–15.7)
LDLC SERPL CALC-MCNC: 69 MG/DL
MICROALBUMIN UR-MCNC: 68 MG/L
MICROALBUMIN/CREAT UR: 53.44 MG/G CR (ref 0–25)
NONHDLC SERPL-MCNC: 106 MG/DL
POTASSIUM SERPL-SCNC: 3.8 MMOL/L (ref 3.4–5.3)
PROT SERPL-MCNC: 7.7 G/DL (ref 6.8–8.8)
SODIUM SERPL-SCNC: 135 MMOL/L (ref 133–144)
TRIGL SERPL-MCNC: 185 MG/DL
TSH SERPL DL<=0.005 MIU/L-ACNC: 3.88 MU/L (ref 0.4–4)

## 2021-06-14 PROCEDURE — 85018 HEMOGLOBIN: CPT | Performed by: INTERNAL MEDICINE

## 2021-06-14 PROCEDURE — 80053 COMPREHEN METABOLIC PANEL: CPT | Performed by: INTERNAL MEDICINE

## 2021-06-14 PROCEDURE — 36415 COLL VENOUS BLD VENIPUNCTURE: CPT | Performed by: INTERNAL MEDICINE

## 2021-06-14 PROCEDURE — 80061 LIPID PANEL: CPT | Performed by: INTERNAL MEDICINE

## 2021-06-14 PROCEDURE — 84443 ASSAY THYROID STIM HORMONE: CPT | Performed by: INTERNAL MEDICINE

## 2021-06-14 PROCEDURE — 82043 UR ALBUMIN QUANTITATIVE: CPT | Performed by: INTERNAL MEDICINE

## 2021-06-14 PROCEDURE — 83036 HEMOGLOBIN GLYCOSYLATED A1C: CPT | Performed by: INTERNAL MEDICINE

## 2021-06-14 PROCEDURE — 99396 PREV VISIT EST AGE 40-64: CPT | Performed by: INTERNAL MEDICINE

## 2021-06-14 RX ORDER — GLIMEPIRIDE 2 MG/1
TABLET ORAL
Qty: 90 TABLET | Refills: 3 | Status: SHIPPED | OUTPATIENT
Start: 2021-06-14 | End: 2022-05-24

## 2021-06-14 RX ORDER — ATORVASTATIN CALCIUM 10 MG/1
10 TABLET, FILM COATED ORAL DAILY
Qty: 90 TABLET | Refills: 3 | Status: SHIPPED | OUTPATIENT
Start: 2021-06-14 | End: 2022-05-24

## 2021-06-14 RX ORDER — NORETHINDRONE ACETATE AND ETHINYL ESTRADIOL 1MG-20(21)
1 KIT ORAL DAILY
Qty: 84 TABLET | Refills: 3 | Status: SHIPPED | OUTPATIENT
Start: 2021-06-14 | End: 2022-05-24

## 2021-06-14 ASSESSMENT — MIFFLIN-ST. JEOR: SCORE: 1164.75

## 2021-06-14 NOTE — PROGRESS NOTES
SUBJECTIVE:   CC: Theresa Quarles is an 51 year old woman who presents for preventive health visit.     Patient has been advised of split billing requirements and indicates understanding: Yes     Healthy Habits:    Do you get at least three servings of calcium containing foods daily (dairy, green leafy vegetables, etc.)? yes    Amount of exercise or daily activities, outside of work: 5 day(s) per week    Problems taking medications regularly No    Medication side effects: No    Have you had an eye exam in the past two years? yes    Do you see a dentist twice per year? yes    Do you have sleep apnea, excessive snoring or daytime drowsiness?no    Today's PHQ-2 Score:   PHQ-2 ( 1999 [employer]) 5/13/2021 6/9/2020   Q1: Little interest or pleasure in doing things 0 0   Q2: Feeling down, depressed or hopeless 0 0   PHQ-2 Score 0 0   Q1: Little interest or pleasure in doing things - -   Q2: Feeling down, depressed or hopeless - -   PHQ-2 Score - -       Abuse: Current or Past(Physical, Sexual or Emotional)- No  Do you feel safe in your environment? Yes    Have you ever done Advance Care Planning? (For example, a Health Directive, POLST, or a discussion with a medical provider or your loved ones about your wishes): No, advance care planning information given to patient to review.  Advanced care planning was discussed at today's visit.    Social History     Tobacco Use     Smoking status: Never Smoker     Smokeless tobacco: Never Used   Substance Use Topics     Alcohol use: No     If you drink alcohol do you typically have >3 drinks per day or >7 drinks per week? No                     Reviewed orders with patient.  Reviewed health maintenance and updated orders accordingly - Yes    FSH-7: No flowsheet data found.    Mammogram Screening: Recommended annual mammography  Pertinent mammograms are reviewed under the imaging tab.    Pertinent mammograms are reviewed under the imaging tab.  History of abnormal Pap smear: NO - age  "30-65 PAP every 5 years with negative HPV co-testing recommended  PAP / HPV Latest Ref Rng & Units 5/13/2021 4/16/2020 11/23/2018   PAP - NIL NIL NIL   HPV 16 DNA NEG:Negative Positive(A) Negative Negative   HPV 18 DNA NEG:Negative Negative Negative Negative   OTHER HR HPV NEG:Negative Negative Negative Positive(A)     Reviewed and updated as needed this visit by clinical staff               Reviewed and updated as needed this visit by Provider                  ROS:  CONSTITUTIONAL: NEGATIVE for fever, chills, change in weight  EYES: NEGATIVE for vision changes or irritation  ENT: NEGATIVE for ear, mouth and throat problems  RESP: NEGATIVE for significant cough or SOB  BREAST: NEGATIVE for masses, tenderness or discharge  CV: NEGATIVE for chest pain, palpitations or peripheral edema  GI: NEGATIVE for nausea, abdominal pain, heartburn, or change in bowel habits  : NEGATIVE for unusual urinary or vaginal symptoms.  MUSCULOSKELETAL: NEGATIVE for significant arthralgias or myalgia  NEURO: NEGATIVE for weakness, dizziness or paresthesias  PSYCHIATRIC: NEGATIVE for changes in mood or affect    OBJECTIVE:   /82   Pulse 84   Temp 98.1  F (36.7  C) (Temporal)   Resp 14   Ht 1.575 m (5' 2\")   Wt 58.7 kg (129 lb 4.8 oz)   SpO2 100%   BMI 23.65 kg/m    EXAM:  GENERAL: healthy, alert and no distress  EYES: Eyes grossly normal to inspection, PERRL and conjunctivae and sclerae normal  HENT: ear canals and TM's normal, nose and mouth without ulcers or lesions  NECK: no adenopathy, no asymmetry, masses, or scars and thyroid normal to palpation  RESP: lungs clear to auscultation - no rales, rhonchi or wheezes  BREAST:  Declined per patient  CV: regular rate and rhythm, normal S1 S2, no S3 or S4, no click or rub, no peripheral edema and peripheral pulses strong  ABDOMEN: soft, nontender, no hepatosplenomegaly, no masses and bowel sounds normal  MS: no gross musculoskeletal defects noted  NEURO: No focal changes  PSYCH: " "mentation appears normal, affect normal/bright      ASSESSMENT/PLAN:     (Z00.00) Routine general medical examination at a health care facility  (primary encounter diagnosis)  Comment: Advised updated tetanus booster and pneumococcal vaccination via the patient's pharmacy.  Plan: Hemoglobin, Comprehensive metabolic panel,         Lipid Profile            (E11.9) Type 2 diabetes mellitus without complication, without long-term current use of insulin (H)  Comment: Continuing with current medications as ordered with routine labs as directed  Plan: Comprehensive metabolic panel, Hemoglobin A1c,         Albumin Random Urine Quantitative with Creat         Ratio, TSH with free T4 reflex, glimepiride         (AMARYL) 2 MG tablet, metFORMIN (GLUCOPHAGE)         500 MG tablet        Patient intolerant to ACE and ARB due to angioedema  Also advised patient to update me with eye exam as she states this was recently done at Dr. Farfan's office.    (E78.5) Hyperlipidemia LDL goal <100  Comment: Labs as fasting, continue with statin therapy  Plan: Lipid Profile            (Z12.11) Colon cancer screening  Comment: Advise consideration of colonscopy age 52  Plan:     (N93.9) Abnormal uterine bleeding (AUB)  Comment: Patient requested refill of birth control although she has been followed in obstetrics and gynecology department by Dr. Arriaza.  Plan: norethindrone-ethinyl estradiol (JUNEL FE 1/20)        1-20 MG-MCG tablet          '      Patient has been advised of split billing requirements and indicates understanding: Yes  COUNSELING:   Reviewed preventive health counseling, as reflected in patient instructions       Regular exercise       Healthy diet/nutrition    Estimated body mass index is 23.78 kg/m  as calculated from the following:    Height as of 4/16/20: 1.575 m (5' 2\").    Weight as of 5/13/21: 59 kg (130 lb).        She reports that she has never smoked. She has never used smokeless tobacco.      Counseling Resources:ATP " IV Guidelines  Pooled Cohorts Equation Calculator  Breast Cancer Risk Calculator  BRCA-Related Cancer Risk Assessment: FHS-7 Tool  FRAX Risk Assessment  ICSI Preventive Guidelines  Dietary Guidelines for Americans, 2010  USDA's MyPlate  ASA Prophylaxis  Lung CA Screening    Arsenio Smith MD  [facility]

## 2021-07-08 ENCOUNTER — OFFICE VISIT (OUTPATIENT)
Dept: OBGYN | Facility: CLINIC | Age: 51
End: 2021-07-08
Payer: COMMERCIAL

## 2021-07-08 VITALS
SYSTOLIC BLOOD PRESSURE: 130 MMHG | DIASTOLIC BLOOD PRESSURE: 80 MMHG | BODY MASS INDEX: 24.11 KG/M2 | HEIGHT: 62 IN | WEIGHT: 131 LBS

## 2021-07-08 DIAGNOSIS — R87.810 CERVICAL HIGH RISK HPV (HUMAN PAPILLOMAVIRUS) TEST POSITIVE: Primary | ICD-10-CM

## 2021-07-08 PROCEDURE — 88342 IMHCHEM/IMCYTCHM 1ST ANTB: CPT | Performed by: PATHOLOGY

## 2021-07-08 PROCEDURE — 88305 TISSUE EXAM BY PATHOLOGIST: CPT | Performed by: PATHOLOGY

## 2021-07-08 PROCEDURE — 57454 BX/CURETT OF CERVIX W/SCOPE: CPT | Performed by: OBSTETRICS & GYNECOLOGY

## 2021-07-08 ASSESSMENT — MIFFLIN-ST. JEOR: SCORE: 1172.46

## 2021-07-08 NOTE — NURSING NOTE
"Chief Complaint   Patient presents with     Colposcopy       Initial /80   Ht 1.575 m (5' 2\")   Wt 59.4 kg (131 lb)   LMP 2021 (Exact Date)   Breastfeeding No   BMI 23.96 kg/m   Estimated body mass index is 23.96 kg/m  as calculated from the following:    Height as of this encounter: 1.575 m (5' 2\").    Weight as of this encounter: 59.4 kg (131 lb).  BP completed using cuff size: regular    Questioned patient about current smoking habits.  Pt. has never smoked.          The following HM Due: NONE      The following patient reported/Care Every where data was sent to:  P ABSTRACT QUALITY INITIATIVES [[id number]]  Kelin Galvez LPN             "

## 2021-07-08 NOTE — PROGRESS NOTES
2021  Theresa Quarles  1970   54 year old SEA female  OCP's for contraception    Reason for abnormal colpo:  Normal pap but pos for HPV 16; first abnl  Referring MD:  Kraig Arriaza M.D.    Written/Informed consent?  Yes  Patient Education materials?  Yes    Patient's last menstrual period was 2021 (exact date).  Pregnant? No  Abnormal bleeding? No    Current Outpatient Medications:      ACE/ARB/ARNI NOT PRESCRIBED (INTENTIONAL), Please choose reason not prescribed, below, Disp: , Rfl:      acetaminophen (TYLENOL) 325 MG tablet, Take 325-650 mg by mouth every 6 hours as needed for mild pain, Disp: , Rfl:      aspirin (ASA) 81 MG tablet, Take 1 tablet (81 mg) by mouth daily, Disp: 90 tablet, Rfl: 3     atorvastatin (LIPITOR) 10 MG tablet, Take 1 tablet (10 mg) by mouth daily, Disp: 90 tablet, Rfl: 3     blood glucose monitoring (ACCU-CHEK BECKY PLUS) meter device kit, Use to test blood sugars 3 times daily or as directed with appropriate meter test strips and lancets and ETOH swabs, # 1 month, refill times 11, Disp: 1 kit, Rfl: 5     CINNAMON PO, Take  by mouth., Disp: , Rfl:      glimepiride (AMARYL) 2 MG tablet, TAKE 1 TABLET EVERY MORNINGBEFORE BREAKFAST, Disp: 90 tablet, Rfl: 3     metFORMIN (GLUCOPHAGE) 500 MG tablet, Take 2 tablets (1,000 mg) by mouth 2 times daily (with meals), Disp: 360 tablet, Rfl: 3     MULTIVITAMINS PO TABS, ONE DAILY, Disp: 100 Tab, Rfl: 3     norethindrone-ethinyl estradiol (JUNEL FE 1/20) 1-20 MG-MCG tablet, Take 1 tablet by mouth daily, Disp: 84 tablet, Rfl: 3     omeprazole (PRILOSEC) 20 MG DR capsule, Take 1 capsule (20 mg) by mouth daily, Disp: 30 capsule, Rfl: 0  Allergies   Allergen Reactions     Lisinopril Hives     Noted angioedema     Contraception: OCP  Age of first intercourse: 29  Total partners: 1  Current partner:  Long term  STD HX: HPV 16  First abnl  STD Culture:No     Sex abuse: No    HPV Vaccine:No  Smoker:No  ETOH: No  Drugs: No    Past  Medical History:   Diagnosis Date     Cervical high risk HPV (human papillomavirus) test positive 2018     Fatty liver 2012     GBS (group B streptococcus) infection 2008     gestational diabetes      Gestational diabetes mellitus, antepartum 2009     Hyperlipidemia LDL goal < 130 2010     Liver function test abnormality 2010     Pregnancies, high-risk 2009     Social History     Tobacco Use     Smoking status: Never Smoker     Smokeless tobacco: Never Used   Substance Use Topics     Alcohol use: No     Drug use: No       Family history of female cancer(s):   Family History   Problem Relation Age of Onset     Cerebrovascular Disease Father              Thyroid Disease Mother         s/p thyrodectomy     Cancer - colorectal Mother         rectal cancer     Lab Results   Component Value Date    PAP NIL 2021    PAP NIL 2020    PAP NIL 2018             Pap Hx:    Lab Results   Component Value Date    PAP NIL 2021       Colpo Hx:  Date: first exam today       Treatment HX:  Past Surgical History:   Procedure Laterality Date     BIOPSY BREAST  2012    needle bx benign     HC EXCISE HAND/FOOT NEUROMA       HC REMOVAL OF TONSILS,12+ Y/O      Tonsils 14+y.o.       PROCEDURE:  COLPOSCOPY   After a procedural timeout was taken, she was positioned in dorsal lithotomy and a speculum was inserted to allow visualization of the cervix. A 5% acetic acid solution was applied to the ectocervix with large swabs.   Colposcopic examination was then undertaken of the cervix, distal vaginal canal and vaginal fornices    Yes Transformation zone TZ  Yes  Columnar epithellum C  Yes  Squamocolumnar junction SCJ  Yes  Original SCJ  No  Squamous metaplasia OSCJ  Yes  Nabothian cyst  N  Yes  Gland openings G  Yes  Acetowhite epithelium AWE  No  Punctuation line /coarse /reverse P  No  Mosaic fine/ coarse  M  No  Atypical vessels  AV  No  Suspect cancer   Ca  Yes  [place] adequate     Normal CE in canal  No abnormal vessels  Vagina and vulva normal  [place] adequate  bx from 6:00 and ECC  Clinical impression subcutaneous metaplasia soft HPV      Nothing in vagina for 48 hrs, call for bleeding > a period, pain not relieved w tylenol or advil, call in 1 week to review path and devel a plan

## 2021-07-14 LAB — COPATH REPORT: NORMAL

## 2021-07-16 ENCOUNTER — OFFICE VISIT (OUTPATIENT)
Dept: OBGYN | Facility: CLINIC | Age: 51
End: 2021-07-16
Payer: COMMERCIAL

## 2021-07-16 VITALS — WEIGHT: 129 LBS | DIASTOLIC BLOOD PRESSURE: 72 MMHG | BODY MASS INDEX: 23.59 KG/M2 | SYSTOLIC BLOOD PRESSURE: 112 MMHG

## 2021-07-16 DIAGNOSIS — R87.810 CERVICAL HIGH RISK HPV (HUMAN PAPILLOMAVIRUS) TEST POSITIVE: ICD-10-CM

## 2021-07-16 DIAGNOSIS — N87.0 CIN I (CERVICAL INTRAEPITHELIAL NEOPLASIA I): Primary | ICD-10-CM

## 2021-07-16 PROCEDURE — 99213 OFFICE O/P EST LOW 20 MIN: CPT | Performed by: OBSTETRICS & GYNECOLOGY

## 2021-07-16 NOTE — RESULT ENCOUNTER NOTE
I discussed this result with the patient.  Please see the office visit notes from 7/16/2021  Kraig Arriaza MD FACOG

## 2021-07-16 NOTE — NURSING NOTE
"Chief Complaint   Patient presents with     Results       Initial /72   Wt 58.5 kg (129 lb)   LMP 2021 (Exact Date)   BMI 23.59 kg/m   Estimated body mass index is 23.59 kg/m  as calculated from the following:    Height as of 21: 1.575 m (5' 2\").    Weight as of this encounter: 58.5 kg (129 lb).  BP completed using cuff size: regular    Questioned patient about current smoking habits.  Pt. has never smoked.          The following HM Due: NONE      The following patient reported/Care Every where data was sent to:  P ABSTRACT QUALITY INITIATIVES [[id number]]        Charlee Cabrera [facility]                 "

## 2021-07-16 NOTE — PATIENT INSTRUCTIONS
You can reach your [facility] Care Team any time of the day by calling [phone]. This number will put you in touch with the 24 hour nurse line if the clinic is closed.    To contact your OB/GYN Station Coordinator/Surgery Scheduler please call [phone]. This is a direct number for your care team between 8 a.m. and 4 p.m. Monday through Friday.    [facility] is open for your convenience:  Monday through Friday 8 a.m. to 6 p.m.  Closed weekends and all major holidays.

## 2021-07-16 NOTE — PROGRESS NOTES
54 year old SEA female  OCP's for contraception (the patient states that she is 54 and not 51, she states that her birthdate was arbitrarily assigned when she  immigrated to the [place]) who presents today to discuss the results of her colposcopy from 2021 done to evaluate abnormal Pap with cotesting positive for HPV 16.    The pathology report from the colposcopy revealed the following    FINAL DIAGNOSIS:   A:  Cervix, 6:00, biopsy-   -LOGAN-1/LSIL/Mild dysplasia   -Sampling includes ectocervix and endocervix   - Negative for high-grade dysplasia or malignancy     B:  Endocervix, curettings-   - Scant fragments of ectocervical squamous mucosa with LOGAN-1/LSIL/Mild   dysplasia   - Benign endocervical glandular mucosa without dysplasia or malignancy     I had a lengthy discussion with the patient today regarding the pathology report, the pathophysiology of cervical atypia and its relationship to HPV and the risks benefits and alternative forms of therapy.  The patient is in a monogamous relationship with an otherwise asymptomatic individual and has no other risk factors.  Risks, benefits, and alternative modes of therapy discussed at length. Pathophysiology of the disease process reviewed, all of the patients questions answered and informed consent obtained.      Past Medical History:   Diagnosis Date     Cervical high risk HPV (human papillomavirus) test positive 2018     Fatty liver 2012     GBS (group B streptococcus) infection 2008     gestational diabetes      Gestational diabetes mellitus, antepartum 2009     Hyperlipidemia LDL goal < 130 2010     Liver function test abnormality 2010     Pregnancies, high-risk 2009     Current Outpatient Medications   Medication     ACE/ARB/ARNI NOT PRESCRIBED (INTENTIONAL)     acetaminophen (TYLENOL) 325 MG tablet     aspirin (ASA) 81 MG tablet     atorvastatin (LIPITOR) 10 MG tablet     blood glucose monitoring  (ACCU-CHEK BECKY PLUS) meter device kit     CINNAMON PO     glimepiride (AMARYL) 2 MG tablet     metFORMIN (GLUCOPHAGE) 500 MG tablet     MULTIVITAMINS PO TABS     norethindrone-ethinyl estradiol (JUNEL FE 1/20) 1-20 MG-MCG tablet     omeprazole (PRILOSEC) 20 MG DR capsule     No current facility-administered medications for this visit.     /72   Wt 58.5 kg (129 lb)   LMP 06/29/2021 (Exact Date)   BMI 23.59 kg/m    Constitutional: healthy, alert and no distress    (N87.0) LOGAN I (cervical intraepithelial neoplasia I)  (primary encounter diagnosis)  Comment: Abnormal lesion biopsied off today.  I gave the patient a detailed written outline of our discussion  Plan: Like to repeat a Pap smear in 6 months and colposcopy in a year.  I will do cotesting with her Pap smear.  If her Pap is abnormal may consider repeat colposcopy sooner.  A detailed written plan was given.  Thank the patient has a good understanding of the plan and is excepting    (R81.204) Cervical high risk HPV (human papillomavirus) test positive  Comment: As above  Plan: As above

## 2021-08-04 ENCOUNTER — PATIENT OUTREACH (OUTPATIENT)
Dept: OBGYN | Facility: CLINIC | Age: 51
End: 2021-08-04

## 2021-08-04 DIAGNOSIS — N87.0 DYSPLASIA OF CERVIX, LOW GRADE (CIN 1): ICD-10-CM

## 2021-08-04 NOTE — TELEPHONE ENCOUNTER
7/8/21 [place] Bx & ECC: LOGAN 1. Plan follow up visit to discuss  7/16/21 Follow up visit: Plan per provider: Cotest in 6 months (due 1/8/22) and colposcopy in a year.  If her Pap is abnormal may consider repeat colposcopy sooner.

## 2021-09-18 ENCOUNTER — HEALTH MAINTENANCE LETTER (OUTPATIENT)
Age: 51
End: 2021-09-18

## 2021-12-21 ENCOUNTER — PATIENT OUTREACH (OUTPATIENT)
Dept: OBGYN | Facility: CLINIC | Age: 51
End: 2021-12-21
Payer: COMMERCIAL

## 2021-12-21 DIAGNOSIS — N87.0 DYSPLASIA OF CERVIX, LOW GRADE (CIN 1): ICD-10-CM

## 2022-01-06 ENCOUNTER — OFFICE VISIT (OUTPATIENT)
Dept: URGENT CARE | Facility: URGENT CARE | Age: 52
End: 2022-01-06
Payer: COMMERCIAL

## 2022-01-06 VITALS
SYSTOLIC BLOOD PRESSURE: 166 MMHG | TEMPERATURE: 98.3 F | WEIGHT: 130 LBS | HEART RATE: 98 BPM | BODY MASS INDEX: 23.78 KG/M2 | DIASTOLIC BLOOD PRESSURE: 93 MMHG | OXYGEN SATURATION: 98 %

## 2022-01-06 DIAGNOSIS — E11.65 TYPE 2 DIABETES MELLITUS WITH HYPERGLYCEMIA, WITHOUT LONG-TERM CURRENT USE OF INSULIN (H): ICD-10-CM

## 2022-01-06 DIAGNOSIS — R59.9 GLANDS SWOLLEN: Primary | ICD-10-CM

## 2022-01-06 DIAGNOSIS — D64.9 ANEMIA, UNSPECIFIED TYPE: ICD-10-CM

## 2022-01-06 LAB
ANION GAP SERPL CALCULATED.3IONS-SCNC: 7 MMOL/L (ref 3–14)
BASOPHILS # BLD AUTO: 0 10E3/UL (ref 0–0.2)
BASOPHILS NFR BLD AUTO: 1 %
BUN SERPL-MCNC: 7 MG/DL (ref 7–30)
CALCIUM SERPL-MCNC: 9.5 MG/DL (ref 8.5–10.1)
CHLORIDE BLD-SCNC: 102 MMOL/L (ref 94–109)
CO2 SERPL-SCNC: 25 MMOL/L (ref 20–32)
CREAT SERPL-MCNC: 0.44 MG/DL (ref 0.52–1.04)
DEPRECATED S PYO AG THROAT QL EIA: NEGATIVE
EOSINOPHIL # BLD AUTO: 0.1 10E3/UL (ref 0–0.7)
EOSINOPHIL NFR BLD AUTO: 2 %
ERYTHROCYTE [DISTWIDTH] IN BLOOD BY AUTOMATED COUNT: 19.2 % (ref 10–15)
GFR SERPL CREATININE-BSD FRML MDRD: >90 ML/MIN/1.73M2
GLUCOSE BLD-MCNC: 287 MG/DL (ref 70–99)
GROUP A STREP BY PCR: NOT DETECTED
HBA1C MFR BLD: 8 % (ref 0–5.6)
HCT VFR BLD AUTO: 32.5 % (ref 35–47)
HGB BLD-MCNC: 9.8 G/DL (ref 11.7–15.7)
LYMPHOCYTES # BLD AUTO: 1.9 10E3/UL (ref 0.8–5.3)
LYMPHOCYTES NFR BLD AUTO: 22 %
MCH RBC QN AUTO: 23.2 PG (ref 26.5–33)
MCHC RBC AUTO-ENTMCNC: 30.2 G/DL (ref 31.5–36.5)
MCV RBC AUTO: 77 FL (ref 78–100)
MONOCYTES # BLD AUTO: 1 10E3/UL (ref 0–1.3)
MONOCYTES NFR BLD AUTO: 12 %
MONOCYTES NFR BLD AUTO: NEGATIVE %
NEUTROPHILS # BLD AUTO: 5.4 10E3/UL (ref 1.6–8.3)
NEUTROPHILS NFR BLD AUTO: 64 %
PLATELET # BLD AUTO: 510 10E3/UL (ref 150–450)
POTASSIUM BLD-SCNC: 3.9 MMOL/L (ref 3.4–5.3)
RBC # BLD AUTO: 4.23 10E6/UL (ref 3.8–5.2)
SODIUM SERPL-SCNC: 134 MMOL/L (ref 133–144)
WBC # BLD AUTO: 8.4 10E3/UL (ref 4–11)

## 2022-01-06 PROCEDURE — 83036 HEMOGLOBIN GLYCOSYLATED A1C: CPT | Performed by: NURSE PRACTITIONER

## 2022-01-06 PROCEDURE — 80048 BASIC METABOLIC PNL TOTAL CA: CPT | Performed by: NURSE PRACTITIONER

## 2022-01-06 PROCEDURE — 87651 STREP A DNA AMP PROBE: CPT | Performed by: NURSE PRACTITIONER

## 2022-01-06 PROCEDURE — 85025 COMPLETE CBC W/AUTO DIFF WBC: CPT | Performed by: NURSE PRACTITIONER

## 2022-01-06 PROCEDURE — 36415 COLL VENOUS BLD VENIPUNCTURE: CPT | Performed by: NURSE PRACTITIONER

## 2022-01-06 PROCEDURE — 86308 HETEROPHILE ANTIBODY SCREEN: CPT | Performed by: NURSE PRACTITIONER

## 2022-01-06 PROCEDURE — 99214 OFFICE O/P EST MOD 30 MIN: CPT | Performed by: NURSE PRACTITIONER

## 2022-01-06 NOTE — PROGRESS NOTES
Chief Complaint   Patient presents with     Lymphadenopathy     Throat, jaw (LEFT side) is worse     Ear Problem     LEFT, Itchy x 3-4 weeks         ICD-10-CM    1. Glands swollen  R59.9 Streptococcus A Rapid Screen w/Reflex to PCR - Clinic Collect     Group A Streptococcus PCR Throat Swab     CBC with platelets and differential     Basic metabolic panel  (Ca, Cl, CO2, Creat, Gluc, K, Na, BUN)     Mononucleosis screen     CBC with platelets and differential     Basic metabolic panel  (Ca, Cl, CO2, Creat, Gluc, K, Na, BUN)     Mononucleosis screen   2. Type 2 diabetes mellitus with hyperglycemia, without long-term current use of insulin (H)  E11.65 Basic metabolic panel  (Ca, Cl, CO2, Creat, Gluc, K, Na, BUN)     Hemoglobin A1c     Basic metabolic panel  (Ca, Cl, CO2, Creat, Gluc, K, Na, BUN)     Hemoglobin A1c   3. Anemia, unspecified type  D64.9    Follow-up with primary care provider in 2 weeks to recheck labs and discuss diabetes and anemia. They will also need to reexamine your neck to see if the nodes have gone away. She will be seen sooner if she develops fever, chills or other concerning symptoms.      Results for orders placed or performed in visit on 01/06/22 (from the past 24 hour(s))   Streptococcus A Rapid Screen w/Reflex to PCR - Clinic Collect    Specimen: Throat; Swab   Result Value Ref Range    Group A Strep antigen Negative Negative   CBC with platelets and differential    Narrative    The following orders were created for panel order CBC with platelets and differential.  Procedure                               Abnormality         Status                     ---------                               -----------         ------                     CBC with platelets and d...[[id number]]  Abnormal            Final result                 Please view results for these tests on the individual orders.   Basic metabolic panel  (Ca, Cl, CO2, Creat, Gluc, K, Na, BUN)   Result Value Ref Range    Sodium 134 133 -  144 mmol/L    Potassium 3.9 3.4 - 5.3 mmol/L    Chloride 102 94 - 109 mmol/L    Carbon Dioxide (CO2) 25 20 - 32 mmol/L    Anion Gap 7 3 - 14 mmol/L    Urea Nitrogen 7 7 - 30 mg/dL    Creatinine 0.44 (L) 0.52 - 1.04 mg/dL    Calcium 9.5 8.5 - 10.1 mg/dL    Glucose 287 (H) 70 - 99 mg/dL    GFR Estimate >90 >60 mL/min/1.73m2   Mononucleosis screen   Result Value Ref Range    Mononucleosis Screen Negative Negative   Hemoglobin A1c   Result Value Ref Range    Hemoglobin A1C 8.0 (H) 0.0 - 5.6 %   CBC with platelets and differential   Result Value Ref Range    WBC Count 8.4 4.0 - 11.0 10e3/uL    RBC Count 4.23 3.80 - 5.20 10e6/uL    Hemoglobin 9.8 (L) 11.7 - 15.7 g/dL    Hematocrit 32.5 (L) 35.0 - 47.0 %    MCV 77 (L) 78 - 100 fL    MCH 23.2 (L) 26.5 - 33.0 pg    MCHC 30.2 (L) 31.5 - 36.5 g/dL    RDW 19.2 (H) 10.0 - 15.0 %    Platelet Count 510 (H) 150 - 450 10e3/uL    % Neutrophils 64 %    % Lymphocytes 22 %    % Monocytes 12 %    % Eosinophils 2 %    % Basophils 1 %    Absolute Neutrophils 5.4 1.6 - 8.3 10e3/uL    Absolute Lymphocytes 1.9 0.8 - 5.3 10e3/uL    Absolute Monocytes 1.0 0.0 - 1.3 10e3/uL    Absolute Eosinophils 0.1 0.0 - 0.7 10e3/uL    Absolute Basophils 0.0 0.0 - 0.2 10e3/uL       Subjective     Theresa Quarles is an 52 year old female who presents to clinic today for swollen glands noted by dentist so she presents for evaluation. She feels well otherwise,      ROS: 10 point ROS neg other than the symptoms noted above in the HPI.       Objective    BP (!) 166/93 (BP Location: Left arm, Patient Position: Sitting, Cuff Size: Adult Regular)   Pulse 98   Temp 98.3  F (36.8  C) (Oral)   Wt 59 kg (130 lb)   SpO2 98%   BMI 23.78 kg/m    Nurses notes and VS have been reviewed.    Physical Exam       GENERAL APPEARANCE: healthy appearing, alert     EYES: PERRL, EOMI, sclera non-icteric     HENT: oral exam benign, mucus membranes intact, without ulcers or lesions     NECK: Bilateral adenopathy just below the jaw  bilaterally     RESP: lungs clear to auscultation - no rales, rhonchi or wheezes     CV: regular rates and rhythm, no murmurs, rubs, or gallop     ABDOMEN:  soft, nontender, no HSM or masses and bowel sounds normal     MS: extremities normal- no gross deformities noted; normal muscle tone.     SKIN: no suspicious lesions or rashes     NEURO: Normal strength and tone, mentation intact and speech normal     PSYCH: normal thought process; no significant mood disturbance     LYMPHATICS: posterior cervical: no adenopathy  supraclavicular: no adenopathy  axillary: no adenopathy  inguinal: no adenopathy    Patient Instructions   Follow-up with primary care provider in the next couple of weeks. They will need to recheck some of your laboratory work and reexamine your neck.    If you develop a fever or chills or other concerning symptoms please be seen sooner.      IFEOMA Reddy, [facility] Urgent Care Provider    The use of Dragon/KILTR dictation services may have been used to construct the content in this note; any grammatical or spelling errors are non-intentional. Please contact the author of this note directly if you are in need of any clarification.

## 2022-01-07 NOTE — PATIENT INSTRUCTIONS
Follow-up with primary care provider in the next couple of weeks. They will need to recheck some of your laboratory work and reexamine your neck.    If you develop a fever or chills or other concerning symptoms please be seen sooner.

## 2022-03-05 ENCOUNTER — HEALTH MAINTENANCE LETTER (OUTPATIENT)
Age: 52
End: 2022-03-05

## 2022-03-30 DIAGNOSIS — E11.9 TYPE 2 DIABETES MELLITUS WITHOUT COMPLICATION, WITHOUT LONG-TERM CURRENT USE OF INSULIN (H): ICD-10-CM

## 2022-03-31 ENCOUNTER — OFFICE VISIT (OUTPATIENT)
Dept: OBGYN | Facility: CLINIC | Age: 52
End: 2022-03-31
Payer: COMMERCIAL

## 2022-03-31 VITALS — DIASTOLIC BLOOD PRESSURE: 80 MMHG | WEIGHT: 130 LBS | BODY MASS INDEX: 23.78 KG/M2 | SYSTOLIC BLOOD PRESSURE: 138 MMHG

## 2022-03-31 DIAGNOSIS — N87.0 DYSPLASIA OF CERVIX, LOW GRADE (CIN 1): ICD-10-CM

## 2022-03-31 PROCEDURE — 88175 CYTOPATH C/V AUTO FLUID REDO: CPT | Performed by: OBSTETRICS & GYNECOLOGY

## 2022-03-31 PROCEDURE — 88141 CYTOPATH C/V INTERPRET: CPT

## 2022-03-31 PROCEDURE — 87624 HPV HI-RISK TYP POOLED RSLT: CPT | Performed by: OBSTETRICS & GYNECOLOGY

## 2022-03-31 PROCEDURE — 99213 OFFICE O/P EST LOW 20 MIN: CPT | Performed by: OBSTETRICS & GYNECOLOGY

## 2022-03-31 RX ORDER — GLIMEPIRIDE 2 MG/1
TABLET ORAL
Qty: 90 TABLET | Refills: 0 | OUTPATIENT
Start: 2022-03-31

## 2022-03-31 NOTE — PROGRESS NOTES
54 year old SEA female  OCP's for contraception (the patient states that she is 54 and not 51, she states that her birthdate was arbitrarily assigned when she  [place]rated to the [place]) who presents today to discuss the results of her colposcopy from 2021 done to evaluate abnormal Pap with cotesting positive for HPV 16.     The pathology report from the colposcopy revealed the following     FINAL DIAGNOSIS:   A:  Cervix, 6:00, biopsy-   -LOGAN-1/LSIL/Mild dysplasia   -Sampling includes ectocervix and endocervix   - Negative for high-grade dysplasia or malignancy     B:  Endocervix, curettings-   - Scant fragments of ectocervical squamous mucosa with LOGAN-1/LSIL/Mild   dysplasia   - Benign endocervical glandular mucosa without dysplasia or malignancy   The pt is due for a repeat pap today with appropriate Rx to follow.. I discussed HR HPV 16 and the need for close f/u    Past Medical History:   Diagnosis Date     Cervical high risk HPV (human papillomavirus) test positive 2018     Fatty liver 2012     GBS (group B streptococcus) infection 2008     gestational diabetes      Gestational diabetes mellitus, antepartum 2009     H/O colposcopy with cervical biopsy 2021     Hyperlipidemia LDL goal < 130 2010     Liver function test abnormality 2010     Pregnancies, high-risk 2009     Current Outpatient Medications   Medication     ACE/ARB/ARNI NOT PRESCRIBED (INTENTIONAL)     acetaminophen (TYLENOL) 325 MG tablet     aspirin (ASA) 81 MG tablet     atorvastatin (LIPITOR) 10 MG tablet     blood glucose monitoring (ACCU-CHEK BECKY PLUS) meter device kit     CINNAMON PO     glimepiride (AMARYL) 2 MG tablet     metFORMIN (GLUCOPHAGE) 500 MG tablet     MULTIVITAMINS PO TABS     norethindrone-ethinyl estradiol (JUNEL FE 1/20) 1-20 MG-MCG tablet     No current facility-administered medications for this visit.     /80   Wt 59 kg (130 lb)   BMI 23.78 kg/m     Constitutional: healthy, alert and no distress  Genitourinary: Normal external genitalia without lesions and speculum exam multiparous appearing cervix no lesions seen.  Endo and ectocervical Pap done.  Bimanual exam the uterus is parous small smooth firm mobile adnexa without masses enlargement or tenderness    (N87.0) Dysplasia of cervix, low grade (LOGAN 1)  Comment: Repeat Pap with cotesting done  Plan: Pap diagnostic with HPV        If Pap is normal and cotesting negative may consider repeating a Pap and cotesting 1 year.  If Pap is abnormal will consider repeat colposcopy

## 2022-03-31 NOTE — TELEPHONE ENCOUNTER
Routing refill request to provider for review/approval because:  Creatinine   Date Value Ref Range Status   01/06/2022 0.44 (L) 0.52 - 1.04 mg/dL Final   06/14/2021 0.55 0.52 - 1.04 mg/dL Final     No visit last 6 months      Chay Casas RN  [facility] Triage Nurse

## 2022-03-31 NOTE — NURSING NOTE
"Chief Complaint   Patient presents with     Repeat Pap Smear       Initial /80   Wt 59 kg (130 lb)   BMI 23.78 kg/m   Estimated body mass index is 23.78 kg/m  as calculated from the following:    Height as of 21: 1.575 m (5' 2\").    Weight as of this encounter: 59 kg (130 lb).  BP completed using cuff size: regular    Questioned patient about current smoking habits.  Pt. has never smoked.          The following HM Due: NONE      The following patient reported/Care Every where data was sent to:  P ABSTRACT QUALITY INITIATIVES [[id number]]        Charlee Cabrera [facility]                 "

## 2022-03-31 NOTE — PATIENT INSTRUCTIONS
You can reach your [place] Care Team any time of the day by calling [phone]. This number will put you in touch with the 24 hour nurse line if the clinic is closed.    To contact your OB/GYN Station Coordinator/Surgery Scheduler please call [phone]. This is a direct number for your care team between 8 a.m. and 4 p.m. Monday through Friday.    [facility] is open for your convenience:  Monday through Friday 8 a.m. to 6 p.m.  Closed weekends and all major holidays.

## 2022-04-06 LAB
BKR LAB AP GYN ADEQUACY: ABNORMAL
BKR LAB AP GYN INTERPRETATION: ABNORMAL
BKR LAB AP HPV REFLEX: ABNORMAL
BKR LAB AP PREVIOUS ABNL DX: ABNORMAL
BKR LAB AP PREVIOUS ABNORMAL: ABNORMAL
PATH REPORT.COMMENTS IMP SPEC: ABNORMAL
PATH REPORT.COMMENTS IMP SPEC: ABNORMAL
PATH REPORT.RELEVANT HX SPEC: ABNORMAL

## 2022-04-08 LAB
HUMAN PAPILLOMA VIRUS 16 DNA: NEGATIVE
HUMAN PAPILLOMA VIRUS 18 DNA: NEGATIVE
HUMAN PAPILLOMA VIRUS FINAL DIAGNOSIS: NORMAL
HUMAN PAPILLOMA VIRUS OTHER HR: NEGATIVE

## 2022-04-09 NOTE — RESULT ENCOUNTER NOTE
In light of her hx of HPV 16 and LOGAN 1 with her current ASCUS pap I would like her to have a colp done.  Please notify the pt  Thank you

## 2022-04-11 ENCOUNTER — PATIENT OUTREACH (OUTPATIENT)
Dept: OBGYN | Facility: CLINIC | Age: 52
End: 2022-04-11
Payer: COMMERCIAL

## 2022-04-11 NOTE — LETTER
June 1, 2022      Theresa SARY Robina  [address] [place] [id number]        Dear ,    At [facility], your health and wellness are our primary concern. That is why we are following up on your most recent abnormal Pap smear.    Please call [phone] to schedule an appointment for your recommended follow-up Colposcopy (this cannot be scheduled through [facility]) at your earliest convenience.     If you have completed the appointment outside of the [facility] system, please have the records forwarded to our office. We will update your chart for your provider to review before your next annual wellness visit.     Thank you for choosing [facility]!      Sincerely,    Your [facility] Care Team

## 2022-05-24 ENCOUNTER — OFFICE VISIT (OUTPATIENT)
Dept: INTERNAL MEDICINE | Facility: CLINIC | Age: 52
End: 2022-05-24
Payer: COMMERCIAL

## 2022-05-24 VITALS
RESPIRATION RATE: 15 BRPM | OXYGEN SATURATION: 97 % | HEART RATE: 97 BPM | DIASTOLIC BLOOD PRESSURE: 80 MMHG | WEIGHT: 130.1 LBS | SYSTOLIC BLOOD PRESSURE: 150 MMHG | TEMPERATURE: 97.7 F | BODY MASS INDEX: 23.94 KG/M2 | HEIGHT: 62 IN

## 2022-05-24 DIAGNOSIS — N93.9 ABNORMAL UTERINE BLEEDING (AUB): ICD-10-CM

## 2022-05-24 DIAGNOSIS — I10 BENIGN ESSENTIAL HYPERTENSION: ICD-10-CM

## 2022-05-24 DIAGNOSIS — Z12.31 VISIT FOR SCREENING MAMMOGRAM: ICD-10-CM

## 2022-05-24 DIAGNOSIS — E11.9 TYPE 2 DIABETES MELLITUS WITHOUT COMPLICATION, WITHOUT LONG-TERM CURRENT USE OF INSULIN (H): Primary | ICD-10-CM

## 2022-05-24 DIAGNOSIS — E78.5 HYPERLIPIDEMIA LDL GOAL <100: ICD-10-CM

## 2022-05-24 DIAGNOSIS — K76.0 FATTY LIVER: ICD-10-CM

## 2022-05-24 PROCEDURE — 91305 COVID-19,PF,PFIZER (12+ YRS): CPT | Performed by: INTERNAL MEDICINE

## 2022-05-24 PROCEDURE — 0054A COVID-19,PF,PFIZER (12+ YRS): CPT | Performed by: INTERNAL MEDICINE

## 2022-05-24 PROCEDURE — 99214 OFFICE O/P EST MOD 30 MIN: CPT | Mod: 25 | Performed by: INTERNAL MEDICINE

## 2022-05-24 RX ORDER — ATORVASTATIN CALCIUM 10 MG/1
10 TABLET, FILM COATED ORAL DAILY
Qty: 90 TABLET | Refills: 3 | Status: SHIPPED | OUTPATIENT
Start: 2022-05-24 | End: 2023-05-25

## 2022-05-24 RX ORDER — NORETHINDRONE ACETATE AND ETHINYL ESTRADIOL 1MG-20(21)
1 KIT ORAL DAILY
Qty: 84 TABLET | Refills: 3 | Status: SHIPPED | OUTPATIENT
Start: 2022-05-24 | End: 2023-05-09

## 2022-05-24 RX ORDER — AMLODIPINE BESYLATE 5 MG/1
5 TABLET ORAL DAILY
Qty: 90 TABLET | Refills: 1 | Status: SHIPPED | OUTPATIENT
Start: 2022-05-24 | End: 2022-09-02

## 2022-05-24 RX ORDER — GLIMEPIRIDE 2 MG/1
TABLET ORAL
Qty: 90 TABLET | Refills: 3 | Status: SHIPPED | OUTPATIENT
Start: 2022-05-24 | End: 2023-01-24

## 2022-05-24 NOTE — PROGRESS NOTES
Assessment & Plan     Type 2 diabetes mellitus without complication, without long-term current use of insulin (H)  Orders have been placed for fasting lab work.  - Albumin Random Urine Quantitative with Creat Ratio; Future  - Hemoglobin A1c; Future  - glimepiride (AMARYL) 2 MG tablet; TAKE 1 TABLET EVERY MORNINGBEFORE BREAKFAST  - metFORMIN (GLUCOPHAGE) 500 MG tablet; Take 2 tablets (1,000 mg) by mouth 2 times daily (with meals)  - ACE/ARB/ARNI NOT PRESCRIBED (INTENTIONAL); Please choose reason not prescribed from choices below.  - amLODIPine (NORVASC) 5 MG tablet; Take 1 tablet (5 mg) by mouth daily    Hyperlipidemia LDL goal <100  Abs ordered as fasting per routine, continue with statin therapy  - Lipid panel reflex to direct LDL Fasting; Future  - Hepatic function panel; Future  - atorvastatin (LIPITOR) 10 MG tablet; Take 1 tablet (10 mg) by mouth daily    Fatty liver  Note as baseline, recheck liver function profile as ordered    Visit for screening mammogram  Ordered as routine screening.  - MA SCREENING DIGITAL BILAT - Future  (s+30); Future    Abnormal uterine bleeding (AUB)  Check hemoglobin to follow-up on noted anemia  - CBC with platelets; Future  - norethindrone-ethinyl estradiol (JUNEL FE 1/20) 1-20 MG-MCG tablet; Take 1 tablet by mouth daily    Benign essential hypertension  Initiate therapy, start amlodipine 5 mg daily.  Of note patient has not been tolerant to ACE inhibitor's.  - amLODIPine (NORVASC) 5 MG tablet; Take 1 tablet (5 mg) by mouth daily     See Patient Instructions    Return in about 1 month (around 6/24/2022) for BP Recheck, Lab Work appointment.    Arsenio Smith MD  [facility]    Jaxon Cardenas is a 52 year old who presents for the following health issues  accompanied by her self.    History of Present Illness       Diabetes:   She presents for follow up of diabetes.  She is not checking blood glucose. She is concerned about low blood sugar,  "several less than 70 in the past few weeks. She is having numbness in feet and blurry vision. The patient has had a diabetic eye exam in the last 12 months. Location of last eye exam [facility].           Review of Systems   CONSTITUTIONAL: NEGATIVE for fever, chills, change in weight  EYES: NEGATIVE for vision changes or irritation  ENT/MOUTH: NEGATIVE for ear, mouth and throat problems  RESP: NEGATIVE for significant cough or SOB  CV: NEGATIVE for chest pain, palpitations or peripheral edema  GI: NEGATIVE for nausea, abdominal pain, heartburn, or change in bowel habits  : NEGATIVE for frequency, dysuria, or hematuria  MUSCULOSKELETAL: NEGATIVE for significant arthralgias or myalgia  NEURO: NEGATIVE for weakness, dizziness or paresthesias  HEME: NEGATIVE for bleeding problems  PSYCHIATRIC: NEGATIVE for changes in mood or affect    Current Outpatient Medications   Medication     ACE/ARB/ARNI NOT PRESCRIBED (INTENTIONAL)     acetaminophen (TYLENOL) 325 MG tablet     amLODIPine (NORVASC) 5 MG tablet     aspirin (ASA) 81 MG tablet     atorvastatin (LIPITOR) 10 MG tablet     blood glucose monitoring (ACCU-CHEK BECKY PLUS) meter device kit     CINNAMON PO     glimepiride (AMARYL) 2 MG tablet     metFORMIN (GLUCOPHAGE) 500 MG tablet     MULTIVITAMINS PO TABS     norethindrone-ethinyl estradiol (JUNEL FE 1/20) 1-20 MG-MCG tablet     No current facility-administered medications for this visit.           Objective    BP (!) 150/80   Pulse 97   Temp 97.7  F (36.5  C) (Temporal)   Resp 15   Ht 1.575 m (5' 2\")   Wt 59 kg (130 lb 1.6 oz)   LMP 05/08/2022 (Exact Date)   SpO2 97%   Breastfeeding No   BMI 23.80 kg/m    There is no height or weight on file to calculate BMI.  Physical Exam   GENERAL: healthy, alert and no distress  EYES: Eyes grossly normal to inspection, PERRL and conjunctivae and sclerae normal  HENT: ear canals and TM's normal, nose and mouth without ulcers or lesions  NECK: no adenopathy, no " asymmetry, masses, or scars and thyroid normal to palpation  RESP: lungs clear to auscultation - no rales, rhonchi or wheezes  CV: regular rate and rhythm, normal S1 S2, no S3 or S4, no click or rub  MS: no gross musculoskeletal defects noted  NEURO: No focal changes  PSYCH: mentation appears normal, affect normal/bright    Lab Results   Component Value Date    A1C 8.0 01/06/2022    A1C 7.7 06/14/2021    A1C 7.7 09/05/2020    A1C 9.2 05/09/2020    A1C 9.0 11/04/2019    A1C 7.8 09/07/2019

## 2022-05-27 NOTE — PATIENT INSTRUCTIONS
You can reach your [place] Care Team any time of the day by calling [phone]. This number will put you in touch with the 24 hour nurse line if the clinic is closed.    To contact your OB/GYN Station Coordinator/Surgery Scheduler please call [phone]. This is a direct number for your care team between 8 a.m. and 4 p.m. Monday through Friday.    [facility] is open for your convenience:  Monday through Friday 8 a.m. to 6 p.m.  Closed weekends and all major holidays.    
Xray Hand 3 Views, Left

## 2022-06-03 ENCOUNTER — LAB (OUTPATIENT)
Dept: LAB | Facility: CLINIC | Age: 52
End: 2022-06-03
Payer: COMMERCIAL

## 2022-06-03 DIAGNOSIS — E11.9 TYPE 2 DIABETES MELLITUS WITHOUT COMPLICATION, WITHOUT LONG-TERM CURRENT USE OF INSULIN (H): ICD-10-CM

## 2022-06-03 DIAGNOSIS — E78.5 HYPERLIPIDEMIA LDL GOAL <100: ICD-10-CM

## 2022-06-03 DIAGNOSIS — N93.9 ABNORMAL UTERINE BLEEDING (AUB): ICD-10-CM

## 2022-06-03 LAB
ALBUMIN SERPL-MCNC: 3.6 G/DL (ref 3.4–5)
ALP SERPL-CCNC: 52 U/L (ref 40–150)
ALT SERPL W P-5'-P-CCNC: 26 U/L (ref 0–50)
AST SERPL W P-5'-P-CCNC: 24 U/L (ref 0–45)
BILIRUB DIRECT SERPL-MCNC: 0.2 MG/DL (ref 0–0.2)
BILIRUB SERPL-MCNC: 0.6 MG/DL (ref 0.2–1.3)
CHOLEST SERPL-MCNC: 149 MG/DL
CREAT UR-MCNC: 123 MG/DL
ERYTHROCYTE [DISTWIDTH] IN BLOOD BY AUTOMATED COUNT: 15.3 % (ref 10–15)
FASTING STATUS PATIENT QL REPORTED: YES
HBA1C MFR BLD: 8 % (ref 0–5.6)
HCT VFR BLD AUTO: 37.3 % (ref 35–47)
HDLC SERPL-MCNC: 50 MG/DL
HGB BLD-MCNC: 12.1 G/DL (ref 11.7–15.7)
LDLC SERPL CALC-MCNC: 54 MG/DL
MCH RBC QN AUTO: 27.6 PG (ref 26.5–33)
MCHC RBC AUTO-ENTMCNC: 32.4 G/DL (ref 31.5–36.5)
MCV RBC AUTO: 85 FL (ref 78–100)
MICROALBUMIN UR-MCNC: 88 MG/L
MICROALBUMIN/CREAT UR: 71.54 MG/G CR (ref 0–25)
NONHDLC SERPL-MCNC: 99 MG/DL
PLATELET # BLD AUTO: 409 10E3/UL (ref 150–450)
PROT SERPL-MCNC: 7.8 G/DL (ref 6.8–8.8)
RBC # BLD AUTO: 4.39 10E6/UL (ref 3.8–5.2)
TRIGL SERPL-MCNC: 226 MG/DL
WBC # BLD AUTO: 6.8 10E3/UL (ref 4–11)

## 2022-06-03 PROCEDURE — 80076 HEPATIC FUNCTION PANEL: CPT

## 2022-06-03 PROCEDURE — 80061 LIPID PANEL: CPT

## 2022-06-03 PROCEDURE — 82043 UR ALBUMIN QUANTITATIVE: CPT

## 2022-06-03 PROCEDURE — 36415 COLL VENOUS BLD VENIPUNCTURE: CPT

## 2022-06-03 PROCEDURE — 85027 COMPLETE CBC AUTOMATED: CPT

## 2022-06-03 PROCEDURE — 83036 HEMOGLOBIN GLYCOSYLATED A1C: CPT

## 2022-08-20 ENCOUNTER — HEALTH MAINTENANCE LETTER (OUTPATIENT)
Age: 52
End: 2022-08-20

## 2022-09-02 DIAGNOSIS — E11.9 TYPE 2 DIABETES MELLITUS WITHOUT COMPLICATION, WITHOUT LONG-TERM CURRENT USE OF INSULIN (H): ICD-10-CM

## 2022-09-02 DIAGNOSIS — I10 BENIGN ESSENTIAL HYPERTENSION: ICD-10-CM

## 2022-09-02 RX ORDER — AMLODIPINE BESYLATE 5 MG/1
5 TABLET ORAL DAILY
Qty: 90 TABLET | Refills: 1 | Status: SHIPPED | OUTPATIENT
Start: 2022-09-02 | End: 2023-02-03

## 2022-09-02 NOTE — TELEPHONE ENCOUNTER
Routing refill request to provider for review/approval because:  Labs out of range:    Creatinine   Date Value Ref Range Status   01/06/2022 0.44 (L) 0.52 - 1.04 mg/dL Final   06/14/2021 0.55 0.52 - 1.04 mg/dL Final      BP out of range   BP Readings from Last 3 Encounters:   05/24/22 (!) 150/80   03/31/22 138/80   01/06/22 (!) 166/93

## 2022-09-22 ENCOUNTER — OFFICE VISIT (OUTPATIENT)
Dept: OBGYN | Facility: CLINIC | Age: 52
End: 2022-09-22
Payer: COMMERCIAL

## 2022-09-22 VITALS — DIASTOLIC BLOOD PRESSURE: 70 MMHG | SYSTOLIC BLOOD PRESSURE: 130 MMHG | WEIGHT: 128 LBS | BODY MASS INDEX: 23.41 KG/M2

## 2022-09-22 DIAGNOSIS — R87.810 CERVICAL HIGH RISK HPV (HUMAN PAPILLOMAVIRUS) TEST POSITIVE: Primary | ICD-10-CM

## 2022-09-22 LAB — HCG UR QL: NEGATIVE

## 2022-09-22 PROCEDURE — 88305 TISSUE EXAM BY PATHOLOGIST: CPT | Performed by: PATHOLOGY

## 2022-09-22 PROCEDURE — 87624 HPV HI-RISK TYP POOLED RSLT: CPT | Performed by: OBSTETRICS & GYNECOLOGY

## 2022-09-22 PROCEDURE — 57454 BX/CURETT OF CERVIX W/SCOPE: CPT | Performed by: OBSTETRICS & GYNECOLOGY

## 2022-09-22 PROCEDURE — 88175 CYTOPATH C/V AUTO FLUID REDO: CPT | Performed by: OBSTETRICS & GYNECOLOGY

## 2022-09-22 PROCEDURE — 81025 URINE PREGNANCY TEST: CPT | Performed by: OBSTETRICS & GYNECOLOGY

## 2022-09-22 NOTE — PATIENT INSTRUCTIONS
You can reach your [place] Care Team any time of the day by calling [phone]. This number will put you in touch with the 24 hour nurse line if the clinic is closed.    To contact your OB/GYN Surgery Scheduler please call [phone]. This is a direct number for your care team between 8 a.m. and 4 p.m. Monday through Friday.    [facility] is open for your convenience: [phone]  Monday through Friday 8 a.m. to 8:30 p.m.  Saturday 9 a.m. to 6 p.m.  Sunday Noon to 6 p.m.    They are closed on all major holidays.

## 2022-09-22 NOTE — NURSING NOTE
"Chief Complaint   Patient presents with     Colposcopy       Initial /70   Wt 58.1 kg (128 lb)   LMP 2022   BMI 23.41 kg/m   Estimated body mass index is 23.41 kg/m  as calculated from the following:    Height as of 22: 1.575 m (5' 2\").    Weight as of this encounter: 58.1 kg (128 lb).  BP completed using cuff size: regular    Questioned patient about current smoking habits.  Pt. has never smoked.          The following HM Due: NONE      The following patient reported/Care Every where data was sent to:  P ABSTRACT QUALITY INITIATIVES [[id number]]        Charlee Cabrera [facility]                 "

## 2022-09-22 NOTE — PROGRESS NOTES
September 22, 2022  Theresa Quarles  1970   55 year old SEA female Junel 1/20 for contraception (the patient states that she is 55 and not 52, she states that her birthdate was arbitrarily assigned when she  immigrated to the [place])    Reason for abnormal colpo:  colposcopy from July 8, 2021 done to evaluate abnormal Pap with cotesting positive for HPV 16.     The pathology report from the colposcopy revealed the following     FINAL DIAGNOSIS:   A:  Cervix, 6:00, biopsy-   -LOGAN-1/LSIL/Mild dysplasia   -Sampling includes ectocervix and endocervix   - Negative for high-grade dysplasia or malignancy     B:  Endocervix, curettings-   - Scant fragments of ectocervical squamous mucosa with LOGAN-1/LSIL/Mild   dysplasia   - Benign endocervical glandular mucosa without dysplasia or malignancy   The pt is due for a repeat pap/colp today with appropriate Rx to follow.. I discussed HR HPV 16 and the need for close f/u    Pap 3/31/22 ASCUS  Neg HR HPV  Referring MD:  Luis OSPINA  Written/Informed consent?  Yes  Patient Education materials?  Yes    Patient's last menstrual period was 09/03/2022.  Pregnant? No  Abnormal bleeding? No    Current Outpatient Medications:      ACE/ARB/ARNI NOT PRESCRIBED (INTENTIONAL), Please choose reason not prescribed from choices below., Disp: , Rfl:      acetaminophen (TYLENOL) 325 MG tablet, Take 325-650 mg by mouth every 6 hours as needed for mild pain, Disp: , Rfl:      amLODIPine (NORVASC) 5 MG tablet, Take 1 tablet (5 mg) by mouth daily, Disp: 90 tablet, Rfl: 1     aspirin (ASA) 81 MG tablet, Take 1 tablet (81 mg) by mouth daily, Disp: 90 tablet, Rfl: 3     atorvastatin (LIPITOR) 10 MG tablet, Take 1 tablet (10 mg) by mouth daily, Disp: 90 tablet, Rfl: 3     blood glucose monitoring (ACCU-CHEK BECKY PLUS) meter device kit, Use to test blood sugars 3 times daily or as directed with appropriate meter test strips and lancets and ETOH swabs, # 1 month, refill times 11, Disp: 1 kit, Rfl: 5      CINNAMON PO, Take  by mouth., Disp: , Rfl:      glimepiride (AMARYL) 2 MG tablet, TAKE 1 TABLET EVERY MORNINGBEFORE BREAKFAST, Disp: 90 tablet, Rfl: 3     metFORMIN (GLUCOPHAGE) 500 MG tablet, Take 2 tablets (1,000 mg) by mouth 2 times daily (with meals), Disp: 360 tablet, Rfl: 3     MULTIVITAMINS PO TABS, ONE DAILY, Disp: 100 Tab, Rfl: 3     norethindrone-ethinyl estradiol (JUNEL FE 1/20) 1-20 MG-MCG tablet, Take 1 tablet by mouth daily, Disp: 84 tablet, Rfl: 3  Allergies   Allergen Reactions     Lisinopril Hives     Noted angioedema     Contraception: OCP  Age of first intercourse: 29  Total partners: 1  Current partner: long term  STD HX: HPV 16   F/u neg  STD Culture:No     Sex abuse: No    HPV Vaccine:No  Smoker:No  ETOH: No  Drugs: No    Past Medical History:   Diagnosis Date     Abnormal Pap smear of cervix 2022     Cervical high risk HPV (human papillomavirus) test positive 2018     Fatty liver 2012     GBS (group B streptococcus) infection 2008     gestational diabetes      Gestational diabetes mellitus, antepartum 2009     H/O colposcopy with cervical biopsy 2021     Hyperlipidemia LDL goal < 130 2010     Liver function test abnormality 2010     Pregnancies, high-risk 2009     Social History     Tobacco Use     Smoking status: Never Smoker     Smokeless tobacco: Never Used   Vaping Use     Vaping Use: Never used   Substance Use Topics     Alcohol use: No     Drug use: No       Family history of female cancer(s):   Family History   Problem Relation Age of Onset     Thyroid Disease Mother         s/p thyrodectomy     Cancer - colorectal Mother         rectal cancer     Cerebrovascular Disease Father              Appendicitis Daughter              Pap Hx:    Lab Results   Component Value Date    PAP NIL 2021       Colpo Hx:  Date: as above  Results: as above    Treatment HX:  Past Surgical History:   Procedure Laterality Date      BIOPSY BREAST  2012    needle bx benign     HC REMOVAL OF TONSILS,12+ Y/O  2001    Tonsils 14+y.o.     ZZHC EXCISE HAND/FOOT NEUROMA         PROCEDURE:  COLPOSCOPY   After a procedural timeout was taken, she was positioned in dorsal lithotomy and a speculum was inserted to allow visualization of the cervix. A 5% acetic acid solution was applied to the ectocervix with large swabs.   Colposcopic examination was then undertaken of the cervix, distal vaginal canal and vaginal fornices    Yes Transformation zone TZ  Yes  Columnar epithellum C  Yes  Squamocolumnar junction SCJ  Yes  Original SCJ  Yes  Squamous metaplasia OSCJ  No  Nabothian cyst  N  Yes  Gland openings G  Yes  Acetowhite epithelium AWE  No  Punctuation line /coarse /reverse P  No  Mosaic fine/ coarse  M  No  Atypical vessels  AV  No  Suspect cancer  Ca  Yes  Melvin adequate     Normal CE in canal  No abnormal vessels  Vagina and vulva normal  Melvin adequate  bx from 12:00  Prev area of abnl at 6:00 normal    ECC  Melvin impression  Subcutaneous metaplasia    (R87.810) Cervical high risk HPV (human papillomavirus) test positive  (primary encounter diagnosis)  Comment: Risks, benefits, and alternative modes of therapy discussed at length. Pathophysiology of the disease process reviewed, all of the patients questions answered and informed consent obtained.    Plan: HCG Qual, Urine ([id number])        Neg    Nothing in vagina for 48 hrs, call for bleeding > a period, pain not relieved w tylenol or advil, call in 1 week to review path and devel a plan

## 2022-09-26 LAB
BKR LAB AP GYN ADEQUACY: NORMAL
BKR LAB AP GYN INTERPRETATION: NORMAL
BKR LAB AP HPV REFLEX: NORMAL
BKR LAB AP LMP: NORMAL
BKR LAB AP PREVIOUS ABNL DX: NORMAL
BKR LAB AP PREVIOUS ABNORMAL: NORMAL
PATH REPORT.COMMENTS IMP SPEC: NORMAL
PATH REPORT.FINAL DX SPEC: NORMAL
PATH REPORT.GROSS SPEC: NORMAL
PATH REPORT.MICROSCOPIC SPEC OTHER STN: NORMAL
PATH REPORT.RELEVANT HX SPEC: NORMAL
PATH REPORT.RELEVANT HX SPEC: NORMAL
PHOTO IMAGE: NORMAL

## 2022-09-26 NOTE — RESULT ENCOUNTER NOTE
Please inform the pt of the normal biopsy results.  I would like her to repeat a pap with cotesting in 6 months and if that is normal then go back to yearly pap and cotesting.  If abnl consider repeat colp

## 2022-09-28 ENCOUNTER — PATIENT OUTREACH (OUTPATIENT)
Dept: OBGYN | Facility: CLINIC | Age: 52
End: 2022-09-28

## 2022-09-28 LAB
HUMAN PAPILLOMA VIRUS 16 DNA: NEGATIVE
HUMAN PAPILLOMA VIRUS 18 DNA: NEGATIVE
HUMAN PAPILLOMA VIRUS FINAL DIAGNOSIS: ABNORMAL
HUMAN PAPILLOMA VIRUS OTHER HR: POSITIVE

## 2022-09-28 NOTE — TELEPHONE ENCOUNTER
9/22/22 COLP- Atypia. ECC- negative for dysplasia. NIL pap, HPV processing. Plan  repeat a pap with cotesting in 6 months and if that is normal then go back to yearly pap and cotesting.  If abnl consider repeat colp per provider .

## 2022-11-21 NOTE — PROGRESS NOTES
[address] [address]  Phone: [phone]  Primary Provider: Arsenio Smith  Pre-op Performing Provider: ABBEY MONTE      PREOPERATIVE EVALUATION:  Today's date: 11/22/2022    Theresa Quarles is a 52 year old female who presents for a preoperative evaluation.    Surgical Information:  Surgery/Procedure: Cataract Surgery  Surgery Location: [facility] [place]  Surgeon: Dr. Farfan  Surgery Date: Right Eye 11/30/2022, Left Eye 12/07/2022  Time of Surgery: TBD  Where patient plans to recover: At home with family  Fax number for surgical facility: [phone]    Type of Anesthesia Anticipated: Local with MAC    Assessment & Plan     The proposed surgical procedure is considered LOW risk.     1. Preoperative examination  Appears medically stable to proceed with surgery as scheduled     2. Cataract of both eyes, unspecified cataract type  Symptomatic    3. Type 2 diabetes mellitus without complication, without long-term current use of insulin (H)  Patient not checking blood sugars at home.  Following diabetic diet.  Last A1c from June was elevated at 8.0 showing need for improved control.   Recheck A1c slightly improved 7.7.    Likely contributing to cataract formation. Managed by Dr Smith. Will forward lab results on to him to see if he wishes to adjust Glimepiride vs addition of Jardiance/Farxiga vs other  - FOOT EXAM  - Hemoglobin A1c; Future  - Basic metabolic panel; Future  - Hemoglobin A1c  - Basic metabolic panel    4. Hyperlipidemia LDL goal <100  On statin therapy.  Lipids at goal    5. Hypertension, unspecified type  Intolerant of ACE inhibitor's.  Blood pressure at goal    6. High priority for 2019-nCoV vaccine  Candidate for COVID vaccination booster  - COVID-19,PF,PFIZER BOOSTER BIVALENT 12+Yrs      Risks and Recommendations:  The patient has the following additional risks and recommendations for perioperative complications:   - No  identified additional risk factors other than previously addressed    Patient  Instruction:  Covid Pfizer booster vaccine today  Labs  as ordered re:  diabetes   Approval given to proceed with proposed procedure, without further diagnostic evaluation..  No solid food after midnight prior to your  procedure. May have clear liquids up to 2 hours prior to the  procedure   Take Amlodipine with a small sip of water to swallow the medication when you first get up the  AM of the  procedure unless able to be drinking clear liquids still at that time as above  Hold other prescription and over the counter medications/supplements the day of the  procedure. May resume usual medications/supplements after the procedure    If diabetic labs show A1c still greater than 7, would recommend starting use of a glucometer to monitor blood sugars and adjustment to diabetic medications.  Will defer decisions to your primary doctor Dr Smith    RECOMMENDATION:  APPROVAL GIVEN to proceed with proposed procedure, without further diagnostic evaluation.         Subjective     HPI related to upcoming procedure:   Has some clouding of vision bilaterally with cataracts.  Presents for clearance to undergo surgical correction.  Patient will be followed by Dr Smith for diabetes and other medical issues.  Meeting patient for the first time today.  Regarding current exercise tolerance, patient will do sit ups for 20 to 30 minutes many days of the week without chest pain or shortness of breath    Preop Questions 11/22/2022   1. Have you ever had a heart attack or stroke? No   2. Have you ever had surgery on your heart or blood vessels, such as a stent placement, a coronary artery bypass, or surgery on an artery in your head, neck, heart, or legs? No   3. Do you have chest pain with activity? No   4. Do you have a history of  heart failure? No   5. Do you currently have a cold, bronchitis or symptoms of other infection? No   6. Do you have a cough,  shortness of breath, or wheezing? No   7. Do you or anyone in your family have previous history of blood clots? No   8. Do you or does anyone in your family have a serious bleeding problem such as prolonged bleeding following surgeries or cuts? No   9. Have you ever had problems with anemia or been told to take iron pills? No   10. Have you had any abnormal blood loss such as black, tarry or bloody stools, or abnormal vaginal bleeding? No   11. Have you ever had a blood transfusion? No   12. Are you willing to have a blood transfusion if it is medically needed before, during, or after your surgery? Yes   13. Have you or any of your relatives ever had problems with anesthesia? No   14. Do you have sleep apnea, excessive snoring or daytime drowsiness? No   15. Do you have any artifical heart valves or other implanted medical devices like a pacemaker, defibrillator, or continuous glucose monitor? No   16. Do you have artificial joints? No   17. Are you allergic to latex? No   18. Is there any chance that you may be pregnant? No     Health Care Directive:  Patient does not have a Health Care Directive or Living Will: Discussed advance care planning with patient; however, patient declined at this time.    Preoperative Review of :   reviewed - no record of controlled substances prescribed.      Status of Chronic Conditions:  DIABETES - Patient has a longstanding history of DiabetesType Type II . Patient is being treated with oral agents and denies significant side effects. Control has been fair. Complicating factors include but are not limited to: hypertension, hyperlipidemia and cataracts.     HYPERLIPIDEMIA - Patient has a long history of significant Hyperlipidemia requiring medication for treatment with recent good control. Patient reports no problems or side effects with the medication.     HYPERTENSION - Patient has longstanding history of HTN , currently denies any symptoms referable to elevated blood  pressure. Specifically denies chest pain, palpitations, dyspnea, orthopnea, PND or peripheral edema. Blood pressure readings have been in normal range. Current medication regimen is as listed below. Patient denies any side effects of medication.       Review of Systems  CONSTITUTIONAL: NEGATIVE for fever, chills.  Weight down 2  pounds  INTEGUMENTARY/SKIN: NEGATIVE for worrisome rashes, moles or lesions  EYES: See HPI   ENT/MOUTH: NEGATIVE for ear, mouth and throat problems  RESP: NEGATIVE for significant cough or SOB  CV: NEGATIVE for chest pain, palpitations or peripheral edema  GI: NEGATIVE for nausea, abdominal pain, heartburn, or change in bowel habits  : NEGATIVE for frequency, dysuria, or hematuria  MUSCULOSKELETAL: NEGATIVE for significant arthralgias or myalgia  NEURO: NEGATIVE for weakness, dizziness or paresthesias  ENDOCRINE: NEGATIVE for temperature intolerance. Hx DM and lipids. No checking sugars at all   HEME: NEGATIVE for bleeding problems  PSYCHIATRIC: NEGATIVE for changes in mood or affect    Patient Active Problem List    Diagnosis Date Noted     Dysplasia of cervix, low grade (LOGAN 1) 07/08/2021     Priority: Medium     3/14/2003 NIL pap  4/9/2004 NIL pap  4/25/2005 NIL pap  5/3/2006 NIL pap  12/27/2007 NIL pap  6/18/2009 NIL pap  6/24/2010 NIL pap  8/4/2011 NIL pap  8/30/2012 NIL pap  9/26/2013 NIL pap  10/24/2016 NIL pap, Neg HPV  11/23/18 NIL pap, + HR HPV (not 16/18). Plan 1 year cotest  4/16/20 NIL pap, neg HR HPV. Plan 3 year cotest  5/13/21 NIL pap, + HR HPV 16. Plan colp bef 8/13/21 7/8/21 [place] Bx & ECC: LOGAN 1. Plan follow up visit to discuss  7/16/21 Follow up visit: Plan per provider: Cotest in 6 months (due 1/8/22)   3/31/22 ASCUS pap, neg HPV. Per provider, plan colp due before 6/30/22.  9/22/22 COLP- Atypia. ECC- negative for dysplasia. NIL pap, HPV processing. Plan  repeat a pap with cotesting in 6 months and if that is normal then go back to yearly pap and cotesting.  If abnl  consider repeat colp per provider .   9/28/22 LM for pt to call me back. Pt sent results via [facility]. Seen by patient Theresa Quarles on 9/29/2022 12:25 PM  9/29/22 LM for pt to call me back.  10/3 /22 Results of colp sent via mychart.  10/7/22 LM for pt to check mychart or call me back  10/12/22 LM for pt to call me back or check mychart  10/14/22 Result letter mailed with result and recommendation from colp. Unable to reach pt by phone and she has not checked her mychart.            Abnormal uterine bleeding (AUB) 11/23/2018     Priority: Medium     Cervical high risk HPV (human papillomavirus) test positive 11/23/2018     Priority: Medium     Type 2 diabetes mellitus without complication, without long-term current use of insulin (H) 11/01/2016     Priority: Medium     General counseling for prescription of oral contraceptives 10/24/2016     Priority: Medium     Pyelonephritis 04/30/2015     Priority: Medium     Fatty liver 05/21/2012     Priority: Medium     Other peripheral enthesopathies 11/18/2011     Priority: Medium     Foot joint pain 10/17/2011     Priority: Medium     Abnormality of gait 10/17/2011     Priority: Medium     Other postprocedural status(V45.89) 10/17/2011     Priority: Medium     Butler's metatarsalgia, neuralgia, or neuroma 08/04/2011     Priority: Medium     Plans surgery 8/11       Liver function test abnormality 07/13/2010     Priority: Medium     Hyperlipidemia LDL goal <100 07/13/2010     Priority: Medium     Diagnosis updated by automated process. Provider to review and confirm.       Gestational diabetes mellitus, antepartum 03/09/2009     Priority: Medium      Past Medical History:   Diagnosis Date     Abnormal Pap smear of cervix 03/31/2022     Cervical high risk HPV (human papillomavirus) test positive 11/23/2018 5/13/21     Fatty liver 05/21/2012     GBS (group B streptococcus) infection 09/04/2008     gestational diabetes      Gestational diabetes mellitus, antepartum  03/09/2009     H/O colposcopy with cervical biopsy 07/08/2021     Hyperlipidemia LDL goal < 130 07/13/2010     Liver function test abnormality 07/13/2010     Pregnancies, high-risk 03/19/2009     Past Surgical History:   Procedure Laterality Date     BIOPSY BREAST  2012    needle bx benign     HC REMOVAL OF TONSILS,12+ Y/O  2001    Tonsils 12+y.o.     ZZHC EXCISE HAND/FOOT NEUROMA       Current Outpatient Medications   Medication Sig Dispense Refill     ACE/ARB/ARNI NOT PRESCRIBED (INTENTIONAL) Please choose reason not prescribed from choices below.       acetaminophen (TYLENOL) 325 MG tablet Take 325-650 mg by mouth every 6 hours as needed for mild pain       amLODIPine (NORVASC) 5 MG tablet Take 1 tablet (5 mg) by mouth daily 90 tablet 1     aspirin (ASA) 81 MG tablet Take 1 tablet (81 mg) by mouth daily 90 tablet 3     atorvastatin (LIPITOR) 10 MG tablet Take 1 tablet (10 mg) by mouth daily 90 tablet 3     blood glucose monitoring (ACCU-CHEK BECKY PLUS) meter device kit Use to test blood sugars 3 times daily or as directed with appropriate meter test strips and lancets and ETOH swabs, # 1 month, refill times 11 1 kit 5     CINNAMON PO Take  by mouth.       glimepiride (AMARYL) 2 MG tablet TAKE 1 TABLET EVERY MORNINGBEFORE BREAKFAST 90 tablet 3     metFORMIN (GLUCOPHAGE) 500 MG tablet Take 2 tablets (1,000 mg) by mouth 2 times daily (with meals) 360 tablet 3     MULTIVITAMINS PO TABS ONE DAILY 100 Tab 3     norethindrone-ethinyl estradiol (JUNEL FE 1/20) 1-20 MG-MCG tablet Take 1 tablet by mouth daily 84 tablet 3       Allergies   Allergen Reactions     Lisinopril Hives     Noted angioedema        Social History     Tobacco Use     Smoking status: Never     Smokeless tobacco: Never   Substance Use Topics     Alcohol use: No     Family History   Problem Relation Age of Onset     Thyroid Disease Mother         s/p thyrodectomy     Cancer - colorectal Mother         rectal cancer     Cerebrovascular Disease Father   "            Appendicitis Daughter      History   Drug Use No         Objective     /74   Pulse 87   Temp 98.7  F (37.1  C) (Temporal)   Ht 1.575 m (5' 2\")   Wt 58.3 kg (128 lb 8 oz)   SpO2 98%   BMI 23.50 kg/m      Physical Exam  Eye: PERRL, EOMI. Bilateral cataract  HENT: ear canals and TM's normal and nose and mouth without ulcers or lesions   Neck: no adenopathy. Thyroid normal to palpation. No bruits  Pulm: Lungs clear to auscultation   CV: Regular rates and rhythm  GI: Soft, nontender, Normal active bowel sounds, No hepatosplenomegaly or masses palpable  Ext: Peripheral pulses intact. No edema.  Neuro: Normal strength and tone, sensory exam grossly normal      Recent Labs   Lab Test 22  0838 22  1724 21  0848   HGB 12.1 9.8* 11.2*    510*  --    NA  --  134 135   POTASSIUM  --  3.9 3.8   CR  --  0.44* 0.55   A1C 8.0* 8.0* 7.7*        Diagnostics:     Component      Latest Ref Rng & Units 2022   Sodium      136 - 145 mmol/L 136   Potassium      3.4 - 5.3 mmol/L 3.7   Chloride      98 - 107 mmol/L 99   Carbon Dioxide (CO2)      22 - 29 mmol/L 21 (L)   Anion Gap      7 - 15 mmol/L 16 (H)   Urea Nitrogen      6.0 - 20.0 mg/dL 7.0   Creatinine      0.51 - 0.95 mg/dL 0.43 (L)   Calcium      8.6 - 10.0 mg/dL 9.3   Glucose      70 - 99 mg/dL 103 (H)   GFR Estimate      >60 mL/min/1.73m2 >90   Hemoglobin A1C      0.0 - 5.6 % 7.7 (H)         No EKG required for low risk surgery (cataract ).    Revised Cardiac Risk Index (RCRI):  The patient has the following serious cardiovascular risks for perioperative complications:   - No serious cardiac risks = 0 points     RCRI Interpretation: 0 points: Class I (very low risk - 0.4% complication rate)           Signed Electronically by: Elliott Issa MD  Copy of this evaluation report is provided to requesting physician.       "

## 2022-11-22 ENCOUNTER — OFFICE VISIT (OUTPATIENT)
Dept: INTERNAL MEDICINE | Facility: CLINIC | Age: 52
End: 2022-11-22
Payer: COMMERCIAL

## 2022-11-22 VITALS
TEMPERATURE: 98.7 F | OXYGEN SATURATION: 98 % | HEART RATE: 87 BPM | BODY MASS INDEX: 23.65 KG/M2 | HEIGHT: 62 IN | DIASTOLIC BLOOD PRESSURE: 74 MMHG | SYSTOLIC BLOOD PRESSURE: 136 MMHG | WEIGHT: 128.5 LBS

## 2022-11-22 DIAGNOSIS — E11.9 TYPE 2 DIABETES MELLITUS WITHOUT COMPLICATION, WITHOUT LONG-TERM CURRENT USE OF INSULIN (H): ICD-10-CM

## 2022-11-22 DIAGNOSIS — Z01.818 PREOPERATIVE EXAMINATION: Primary | ICD-10-CM

## 2022-11-22 DIAGNOSIS — H26.9 CATARACT OF BOTH EYES, UNSPECIFIED CATARACT TYPE: ICD-10-CM

## 2022-11-22 DIAGNOSIS — I10 HYPERTENSION, UNSPECIFIED TYPE: ICD-10-CM

## 2022-11-22 DIAGNOSIS — Z23 HIGH PRIORITY FOR 2019-NCOV VACCINE: ICD-10-CM

## 2022-11-22 DIAGNOSIS — E78.5 HYPERLIPIDEMIA LDL GOAL <100: ICD-10-CM

## 2022-11-22 PROBLEM — N93.9 ABNORMAL UTERINE BLEEDING (AUB): Status: RESOLVED | Noted: 2018-11-23 | Resolved: 2022-11-22

## 2022-11-22 LAB
ANION GAP SERPL CALCULATED.3IONS-SCNC: 16 MMOL/L (ref 7–15)
BUN SERPL-MCNC: 7 MG/DL (ref 6–20)
CALCIUM SERPL-MCNC: 9.3 MG/DL (ref 8.6–10)
CHLORIDE SERPL-SCNC: 99 MMOL/L (ref 98–107)
CREAT SERPL-MCNC: 0.43 MG/DL (ref 0.51–0.95)
DEPRECATED HCO3 PLAS-SCNC: 21 MMOL/L (ref 22–29)
GFR SERPL CREATININE-BSD FRML MDRD: >90 ML/MIN/1.73M2
GLUCOSE SERPL-MCNC: 103 MG/DL (ref 70–99)
HBA1C MFR BLD: 7.7 % (ref 0–5.6)
POTASSIUM SERPL-SCNC: 3.7 MMOL/L (ref 3.4–5.3)
SODIUM SERPL-SCNC: 136 MMOL/L (ref 136–145)

## 2022-11-22 PROCEDURE — 0124A COVID-19,PF,PFIZER BOOSTER BIVALENT: CPT | Performed by: INTERNAL MEDICINE

## 2022-11-22 PROCEDURE — 36415 COLL VENOUS BLD VENIPUNCTURE: CPT | Performed by: INTERNAL MEDICINE

## 2022-11-22 PROCEDURE — 91312 COVID-19,PF,PFIZER BOOSTER BIVALENT: CPT | Performed by: INTERNAL MEDICINE

## 2022-11-22 PROCEDURE — 83036 HEMOGLOBIN GLYCOSYLATED A1C: CPT | Performed by: INTERNAL MEDICINE

## 2022-11-22 PROCEDURE — 99214 OFFICE O/P EST MOD 30 MIN: CPT | Mod: 25 | Performed by: INTERNAL MEDICINE

## 2022-11-22 PROCEDURE — 99207 PR FOOT EXAM NO CHARGE: CPT | Performed by: INTERNAL MEDICINE

## 2022-11-22 PROCEDURE — 80048 BASIC METABOLIC PNL TOTAL CA: CPT | Performed by: INTERNAL MEDICINE

## 2022-11-22 NOTE — PATIENT INSTRUCTIONS
Covid Pfizer booster vaccine today  Labs  as ordered re:  diabetes   Approval given to proceed with proposed procedure, without further diagnostic evaluation..  No solid food after midnight prior to your  procedure. May have clear liquids up to 2 hours prior to the  procedure   Take Amlodipine with a small sip of water to swallow the medication when you first get up the  AM of the  procedure unless able to be drinking clear liquids still at that time as above  Hold other prescription and over the counter medications/supplements the day of the  procedure. May resume usual medications/supplements after the procedure    If diabetic labs show A1c still greater than 7, would recommend starting use of a glucometer to monitor blood sugars and adjustment to diabetic medications.  Will defer decisions to your primary doctor Dr Smith

## 2023-01-23 DIAGNOSIS — E11.9 TYPE 2 DIABETES MELLITUS WITHOUT COMPLICATION, WITHOUT LONG-TERM CURRENT USE OF INSULIN (H): ICD-10-CM

## 2023-01-24 RX ORDER — GLIMEPIRIDE 2 MG/1
TABLET ORAL
Qty: 90 TABLET | Refills: 3 | Status: SHIPPED | OUTPATIENT
Start: 2023-01-24 | End: 2023-11-14 | Stop reason: DRUGHIGH

## 2023-02-02 DIAGNOSIS — I10 BENIGN ESSENTIAL HYPERTENSION: ICD-10-CM

## 2023-02-02 DIAGNOSIS — E11.9 TYPE 2 DIABETES MELLITUS WITHOUT COMPLICATION, WITHOUT LONG-TERM CURRENT USE OF INSULIN (H): ICD-10-CM

## 2023-02-03 RX ORDER — AMLODIPINE BESYLATE 5 MG/1
TABLET ORAL
Qty: 90 TABLET | Refills: 1 | Status: SHIPPED | OUTPATIENT
Start: 2023-02-03 | End: 2023-06-28

## 2023-03-08 ENCOUNTER — PATIENT OUTREACH (OUTPATIENT)
Dept: OBGYN | Facility: CLINIC | Age: 53
End: 2023-03-08
Payer: COMMERCIAL

## 2023-03-08 DIAGNOSIS — N87.0 DYSPLASIA OF CERVIX, LOW GRADE (CIN 1): ICD-10-CM

## 2023-03-08 NOTE — LETTER
March 8, 2023      Theresa SARY Robina  [address] [place] [id number]        Dear ,    At [facility], your health and wellness are our primary concern. That is why we are following up on your most recent Colposcopy.    Please call [phone] to schedule an appointment for your recommended follow-up Pap smear and Human Papillomavirus (HPV) test at your earliest convenience.     If you have completed the appointment outside of the [facility] system, please have the records forwarded to our office. We will update your chart for your provider to review before your next annual wellness visit.     Thank you for choosing [facility]!      Sincerely,    Your [facility] Care Team

## 2023-05-18 ENCOUNTER — HOSPITAL ENCOUNTER (INPATIENT)
Facility: CLINIC | Age: 53
LOS: 1 days | Discharge: HOME | End: 2023-05-19
Attending: EMERGENCY MEDICINE | Admitting: INTERNAL MEDICINE
Payer: COMMERCIAL

## 2023-05-18 ENCOUNTER — APPOINTMENT (OUTPATIENT)
Dept: ULTRASOUND IMAGING | Facility: CLINIC | Age: 53
End: 2023-05-18
Attending: EMERGENCY MEDICINE
Payer: COMMERCIAL

## 2023-05-18 ENCOUNTER — OFFICE VISIT (OUTPATIENT)
Dept: URGENT CARE | Facility: URGENT CARE | Age: 53
End: 2023-05-18
Payer: COMMERCIAL

## 2023-05-18 VITALS
OXYGEN SATURATION: 99 % | HEART RATE: 120 BPM | DIASTOLIC BLOOD PRESSURE: 80 MMHG | SYSTOLIC BLOOD PRESSURE: 136 MMHG | WEIGHT: 129 LBS | BODY MASS INDEX: 23.59 KG/M2 | RESPIRATION RATE: 18 BRPM | TEMPERATURE: 99.7 F

## 2023-05-18 DIAGNOSIS — N92.1 MENORRHAGIA WITH IRREGULAR CYCLE: Primary | ICD-10-CM

## 2023-05-18 DIAGNOSIS — N93.9 ABNORMAL UTERINE BLEEDING: ICD-10-CM

## 2023-05-18 DIAGNOSIS — D62 ANEMIA DUE TO BLOOD LOSS, ACUTE: ICD-10-CM

## 2023-05-18 DIAGNOSIS — E11.9 TYPE 2 DIABETES MELLITUS WITHOUT COMPLICATION, WITHOUT LONG-TERM CURRENT USE OF INSULIN (H): ICD-10-CM

## 2023-05-18 LAB
ABO/RH(D): NORMAL
ANION GAP SERPL CALCULATED.3IONS-SCNC: 12 MMOL/L (ref 7–15)
ANTIBODY SCREEN: NEGATIVE
BASOPHILS # BLD AUTO: 0 10E3/UL (ref 0–0.2)
BASOPHILS NFR BLD AUTO: 0 %
BLD PROD TYP BPU: NORMAL
BLD PROD TYP BPU: NORMAL
BLOOD COMPONENT TYPE: NORMAL
BLOOD COMPONENT TYPE: NORMAL
BUN SERPL-MCNC: 7.6 MG/DL (ref 6–20)
CALCIUM SERPL-MCNC: 8.4 MG/DL (ref 8.6–10)
CHLORIDE SERPL-SCNC: 99 MMOL/L (ref 98–107)
CODING SYSTEM: NORMAL
CODING SYSTEM: NORMAL
CREAT SERPL-MCNC: 0.43 MG/DL (ref 0.51–0.95)
CROSSMATCH: NORMAL
CROSSMATCH: NORMAL
DEPRECATED HCO3 PLAS-SCNC: 24 MMOL/L (ref 22–29)
EOSINOPHIL # BLD AUTO: 0.1 10E3/UL (ref 0–0.7)
EOSINOPHIL NFR BLD AUTO: 1 %
ERYTHROCYTE [DISTWIDTH] IN BLOOD BY AUTOMATED COUNT: 13.6 % (ref 10–15)
GFR SERPL CREATININE-BSD FRML MDRD: >90 ML/MIN/1.73M2
GLUCOSE SERPL-MCNC: 219 MG/DL (ref 70–99)
HCG INTACT+B SERPL-ACNC: <1 MIU/ML
HCT VFR BLD AUTO: 17.2 % (ref 35–47)
HGB BLD-MCNC: 5.5 G/DL (ref 11.7–15.7)
HOLD SPECIMEN: NORMAL
IMM GRANULOCYTES # BLD: 0 10E3/UL
IMM GRANULOCYTES NFR BLD: 0 %
INR PPP: 1.04 (ref 0.85–1.15)
ISSUE DATE AND TIME: NORMAL
ISSUE DATE AND TIME: NORMAL
LYMPHOCYTES # BLD AUTO: 2.9 10E3/UL (ref 0.8–5.3)
LYMPHOCYTES NFR BLD AUTO: 36 %
MAGNESIUM SERPL-MCNC: 1.7 MG/DL (ref 1.7–2.3)
MCH RBC QN AUTO: 30.4 PG (ref 26.5–33)
MCHC RBC AUTO-ENTMCNC: 32 G/DL (ref 31.5–36.5)
MCV RBC AUTO: 95 FL (ref 78–100)
MONOCYTES # BLD AUTO: 0.6 10E3/UL (ref 0–1.3)
MONOCYTES NFR BLD AUTO: 7 %
NEUTROPHILS # BLD AUTO: 4.4 10E3/UL (ref 1.6–8.3)
NEUTROPHILS NFR BLD AUTO: 56 %
NRBC # BLD AUTO: 0 10E3/UL
NRBC BLD AUTO-RTO: 0 /100
PLATELET # BLD AUTO: 314 10E3/UL (ref 150–450)
POTASSIUM SERPL-SCNC: 3.8 MMOL/L (ref 3.4–5.3)
RBC # BLD AUTO: 1.81 10E6/UL (ref 3.8–5.2)
SODIUM SERPL-SCNC: 135 MMOL/L (ref 136–145)
SPECIMEN EXPIRATION DATE: NORMAL
TROPONIN T SERPL HS-MCNC: <6 NG/L
UNIT ABO/RH: NORMAL
UNIT ABO/RH: NORMAL
UNIT NUMBER: NORMAL
UNIT NUMBER: NORMAL
UNIT STATUS: NORMAL
UNIT STATUS: NORMAL
UNIT TYPE ISBT: 5100
UNIT TYPE ISBT: 5100
WBC # BLD AUTO: 7.9 10E3/UL (ref 4–11)

## 2023-05-18 PROCEDURE — 83735 ASSAY OF MAGNESIUM: CPT | Performed by: INTERNAL MEDICINE

## 2023-05-18 PROCEDURE — 86901 BLOOD TYPING SEROLOGIC RH(D): CPT | Performed by: EMERGENCY MEDICINE

## 2023-05-18 PROCEDURE — 250N000013 HC RX MED GY IP 250 OP 250 PS 637: Performed by: EMERGENCY MEDICINE

## 2023-05-18 PROCEDURE — 85025 COMPLETE CBC W/AUTO DIFF WBC: CPT | Performed by: EMERGENCY MEDICINE

## 2023-05-18 PROCEDURE — 96360 HYDRATION IV INFUSION INIT: CPT

## 2023-05-18 PROCEDURE — 85610 PROTHROMBIN TIME: CPT | Performed by: INTERNAL MEDICINE

## 2023-05-18 PROCEDURE — 86923 COMPATIBILITY TEST ELECTRIC: CPT | Performed by: EMERGENCY MEDICINE

## 2023-05-18 PROCEDURE — 96361 HYDRATE IV INFUSION ADD-ON: CPT

## 2023-05-18 PROCEDURE — 80048 BASIC METABOLIC PNL TOTAL CA: CPT | Performed by: EMERGENCY MEDICINE

## 2023-05-18 PROCEDURE — 36430 TRANSFUSION BLD/BLD COMPNT: CPT

## 2023-05-18 PROCEDURE — 86850 RBC ANTIBODY SCREEN: CPT | Performed by: EMERGENCY MEDICINE

## 2023-05-18 PROCEDURE — 36415 COLL VENOUS BLD VENIPUNCTURE: CPT | Performed by: EMERGENCY MEDICINE

## 2023-05-18 PROCEDURE — 99223 1ST HOSP IP/OBS HIGH 75: CPT | Mod: AI | Performed by: INTERNAL MEDICINE

## 2023-05-18 PROCEDURE — 258N000003 HC RX IP 258 OP 636: Performed by: EMERGENCY MEDICINE

## 2023-05-18 PROCEDURE — P9016 RBC LEUKOCYTES REDUCED: HCPCS | Performed by: EMERGENCY MEDICINE

## 2023-05-18 PROCEDURE — 99285 EMERGENCY DEPT VISIT HI MDM: CPT | Mod: 25

## 2023-05-18 PROCEDURE — 99214 OFFICE O/P EST MOD 30 MIN: CPT | Performed by: NURSE PRACTITIONER

## 2023-05-18 PROCEDURE — 84484 ASSAY OF TROPONIN QUANT: CPT | Performed by: EMERGENCY MEDICINE

## 2023-05-18 PROCEDURE — 120N000001 HC R&B MED SURG/OB

## 2023-05-18 PROCEDURE — 76856 US EXAM PELVIC COMPLETE: CPT

## 2023-05-18 PROCEDURE — 30233N1 TRANSFUSION OF NONAUTOLOGOUS RED BLOOD CELLS INTO PERIPHERAL VEIN, PERCUTANEOUS APPROACH: ICD-10-PCS | Performed by: FAMILY MEDICINE

## 2023-05-18 PROCEDURE — 84702 CHORIONIC GONADOTROPIN TEST: CPT | Performed by: EMERGENCY MEDICINE

## 2023-05-18 RX ORDER — ACETAMINOPHEN 325 MG/1
650 TABLET ORAL EVERY 6 HOURS PRN
Status: DISCONTINUED | OUTPATIENT
Start: 2023-05-18 | End: 2023-05-19 | Stop reason: HOSPADM

## 2023-05-18 RX ORDER — CARBOXYMETHYLCELLULOSE SODIUM 5 MG/ML
1 SOLUTION/ DROPS OPHTHALMIC 3 TIMES DAILY PRN
COMMUNITY

## 2023-05-18 RX ORDER — NORGESTIMATE AND ETHINYL ESTRADIOL 7DAYSX3 28
1 KIT ORAL DAILY
Status: DISCONTINUED | OUTPATIENT
Start: 2023-05-19 | End: 2023-05-18

## 2023-05-18 RX ORDER — DEXTROSE MONOHYDRATE 25 G/50ML
25-50 INJECTION, SOLUTION INTRAVENOUS
Status: DISCONTINUED | OUTPATIENT
Start: 2023-05-18 | End: 2023-05-19

## 2023-05-18 RX ORDER — ATORVASTATIN CALCIUM 10 MG/1
10 TABLET, FILM COATED ORAL DAILY
Status: DISCONTINUED | OUTPATIENT
Start: 2023-05-19 | End: 2023-05-19 | Stop reason: HOSPADM

## 2023-05-18 RX ORDER — AMOXICILLIN 250 MG
1 CAPSULE ORAL 2 TIMES DAILY PRN
Status: DISCONTINUED | OUTPATIENT
Start: 2023-05-18 | End: 2023-05-19 | Stop reason: HOSPADM

## 2023-05-18 RX ORDER — SODIUM CHLORIDE 9 MG/ML
INJECTION, SOLUTION INTRAVENOUS CONTINUOUS
Status: DISCONTINUED | OUTPATIENT
Start: 2023-05-19 | End: 2023-05-19 | Stop reason: HOSPADM

## 2023-05-18 RX ORDER — IBUPROFEN 200 MG
800 TABLET ORAL EVERY 4 HOURS PRN
COMMUNITY
End: 2023-05-19

## 2023-05-18 RX ORDER — ONDANSETRON 2 MG/ML
4 INJECTION INTRAMUSCULAR; INTRAVENOUS EVERY 6 HOURS PRN
Status: DISCONTINUED | OUTPATIENT
Start: 2023-05-18 | End: 2023-05-19 | Stop reason: HOSPADM

## 2023-05-18 RX ORDER — LIDOCAINE 40 MG/G
CREAM TOPICAL
Status: DISCONTINUED | OUTPATIENT
Start: 2023-05-18 | End: 2023-05-19 | Stop reason: HOSPADM

## 2023-05-18 RX ORDER — AMLODIPINE BESYLATE 5 MG/1
5 TABLET ORAL EVERY EVENING
Status: DISCONTINUED | OUTPATIENT
Start: 2023-05-18 | End: 2023-05-19 | Stop reason: HOSPADM

## 2023-05-18 RX ORDER — AMOXICILLIN 250 MG
2 CAPSULE ORAL 2 TIMES DAILY PRN
Status: DISCONTINUED | OUTPATIENT
Start: 2023-05-18 | End: 2023-05-19 | Stop reason: HOSPADM

## 2023-05-18 RX ORDER — ONDANSETRON 4 MG/1
4 TABLET, ORALLY DISINTEGRATING ORAL EVERY 6 HOURS PRN
Status: DISCONTINUED | OUTPATIENT
Start: 2023-05-18 | End: 2023-05-19 | Stop reason: HOSPADM

## 2023-05-18 RX ORDER — NICOTINE POLACRILEX 4 MG
15-30 LOZENGE BUCCAL
Status: DISCONTINUED | OUTPATIENT
Start: 2023-05-18 | End: 2023-05-19

## 2023-05-18 RX ADMIN — SODIUM CHLORIDE 1000 ML: 9 INJECTION, SOLUTION INTRAVENOUS at 18:12

## 2023-05-18 RX ADMIN — NORGESTREL AND ETHINYL ESTRADIOL 1 TABLET: KIT at 22:42

## 2023-05-18 RX ADMIN — SODIUM CHLORIDE 1000 ML: 9 INJECTION, SOLUTION INTRAVENOUS at 17:32

## 2023-05-18 ASSESSMENT — ACTIVITIES OF DAILY LIVING (ADL)
ADLS_ACUITY_SCORE: 35
ADLS_ACUITY_SCORE: 35
ADLS_ACUITY_SCORE: 37
ADLS_ACUITY_SCORE: 35

## 2023-05-18 NOTE — PROGRESS NOTES
Chief Complaint   Patient presents with     Abnormal Bleeding Problem     Twice menstrual period this month, heavy bleeding, looks clots, headache, dizziness, vomiting x 4 days          ICD-10-CM    1. Menorrhagia with irregular cycle  N92.1       2. Type 2 diabetes mellitus without complication, without long-term current use of insulin (H)  E11.9       Patient is referred to [facility] emergency room for immediate assessment and treatment.  She will likely need advanced imaging, blood work, possible transfusion and gynecology consult.  Patient and  are in agreement with this plan and he will drive her directly there.  [facility] was called with patient information.      Subjective     Theresa Quarles is an 53 year old female who presents to clinic today for frequent heavy vaginal bleeding.  She is having issues with this for the last several months but on   April 24th she bled heavily for ten days, then her period started again on May 12th.  She is going through 1 pad an hour with large clots and saturating.  She has been feeling dizzy and lightheaded over the past week and has had 1 syncopal episode.      ROS: 10 point ROS neg other than the symptoms noted above in the HPI.       Objective    /80 (BP Location: Right arm, Patient Position: Sitting, Cuff Size: Adult Regular)   Pulse 120   Temp 99.7  F (37.6  C) (Oral)   Resp 18   Wt 58.5 kg (129 lb)   LMP 05/12/2023   SpO2 99%   BMI 23.59 kg/m    Nurses notes and VS have been reviewed.    Physical Exam       GENERAL APPEARANCE: alert and pale     SKIN: no suspicious lesions or rashes     PSYCH: normal thought process; no significant mood disturbance      Tess Larry, APRN, CNP  [facility] Provider    The use of Dragon/AppLayer dictation services may have been used to construct the content in this note; any grammatical or spelling errors are non-intentional. Please contact the author of this note directly if you are in need of  any clarification.

## 2023-05-18 NOTE — LETTER
[facility] EMERGENCY DEPT  [address]  [facility] [address]  Phone: [phone]  Fax: [phone]    May 19, 2023        Theresa Quarles  [address] [place] [phone]          To whom it may concern:    RE: Theresa Quarles    Patient was seen and treated today at our clinic and missed work.  She is excused from work starting 5/16/2023 and may return on 5/24/2023.  Patient may return to work 5/24/2023 with the following:  No restrictions    Please contact me for questions or concerns.      Sincerely,         Dr. Melva Teixeira, DO    Obstetrics and Gynecology  [facility] [place]

## 2023-05-18 NOTE — ED PROVIDER NOTES
"  History     Chief Complaint:  Vaginal Bleeding       HPI   Theresa Quarles is a 53 year old female who presents with four days of heavy vaginal bleeding. The patient reports that she has been changing her pad around every 20 minutes throughout the day and night for the last four days. She also reports syncopal episodes two days ago, again yesterday, and almost today. She says that she was still able to hear what was going on around her, however was unable to open her eyes, noting that these episodes lasted for around 30 seconds. She says that the bleeding seems to be decreased today, however she is still having some light-headedness and fatigue. The patient denies any abdominal pain or fevers. Denies use of aspirin or blood thinners. She says that she is premenopausal. She says that she has never had something like this in the past.     Independent Historian:    The patient's  says that she appears substantially more pale than she usually is.     Review of External Notes:  I reviewed clinic note from earlier today due to bleeding and symptoms patient was referred to ED.     ROS:  See HPI    Allergies:  Lisinopril     Physical Exam     Patient Vitals for the past 24 hrs:   BP Temp Temp src Pulse Resp SpO2 Height Weight   05/18/23 2323 138/84 98.4  F (36.9  C) Oral 101 16 99 % -- --   05/18/23 2215 (!) 148/81 98.4  F (36.9  C) Oral 105 16 -- -- --   05/18/23 2145 (!) 148/88 98.3  F (36.8  C) -- 105 16 -- -- --   05/18/23 2046 (!) 150/83 -- -- 108 -- 99 % -- --   05/18/23 2016 (!) 149/84 -- -- 110 -- 99 % -- --   05/18/23 2001 (!) 144/83 -- -- 106 -- 97 % -- --   05/18/23 1946 (!) 155/81 -- -- 108 -- 97 % -- --   05/18/23 1944 (!) 155/81 98.8  F (37.1  C) -- 109 18 96 % -- --   05/18/23 1937 -- -- -- -- -- -- 1.6 m (5' 3\") 58.5 kg (129 lb)   05/18/23 1929 (!) 148/89 99  F (37.2  C) -- 105 16 -- -- --   05/18/23 1927 (!) 148/89 99  F (37.2  C) Oral 105 16 100 % -- --   05/18/23 1814 -- -- -- -- -- 100 % -- -- "   05/18/23 1813 -- -- -- -- -- 100 % -- --   05/18/23 1812 -- -- -- -- -- 100 % -- --   05/18/23 1804 -- -- -- -- -- 100 % -- --   05/18/23 1803 -- -- -- -- -- 99 % -- --   05/18/23 1802 -- -- -- -- -- 100 % -- --   05/18/23 1801 -- -- -- -- -- 100 % -- --   05/18/23 1800 139/82 -- -- 100 -- 100 % -- --   05/18/23 1646 (!) 159/83 97.6  F (36.4  C) Temporal 114 16 100 % -- --      Physical Exam    Constitutional: Vital signs reviewed as above.   Head: No external signs of trauma noted.  Eyes: Pupils are equal, round, and reactive to light. Conjunctival pallor B/L  Neck: No JVD noted  Cardiovascular: tachycardic rate, Regular rhythm and normal heart sounds.  No murmur heard.   Equal B/L peripheral pulses.  Pulmonary/Chest: Effort normal and breath sounds normal. No respiratory distress. Patient has no wheezes. Patient has no rales.   Gastrointestinal: Soft. There is no tenderness.   Musculoskeletal/Extremities: No deformities noted. Normal tone.  Neurological: Patient is alert and oriented to person, place, and time.   Skin: Skin is warm and dry. She appears pale    Emergency Department Course     Imaging:  US Pelvic Complete w Transvaginal   Final Result   IMPRESSION:      Heterogeneous, vascularized lesion within the endometrial cavity, measuring up to 1.8 cm. Primary differential consideration would include an endometrial polyp, though submucosal fibroid or endometrial carcinoma cannot be excluded. Sonohysterography    follow-up is recommended.         Report per radiology    Laboratory:  Labs Ordered and Resulted from Time of ED Arrival to Time of ED Departure   BASIC METABOLIC PANEL - Abnormal       Result Value    Sodium 135 (*)     Potassium 3.8      Chloride 99      Carbon Dioxide (CO2) 24      Anion Gap 12      Urea Nitrogen 7.6      Creatinine 0.43 (*)     Calcium 8.4 (*)     Glucose 219 (*)     GFR Estimate >90     CBC WITH PLATELETS AND DIFFERENTIAL - Abnormal    WBC Count 7.9      RBC Count 1.81 (*)      Hemoglobin 5.5 (*)     Hematocrit 17.2 (*)     MCV 95      MCH 30.4      MCHC 32.0      RDW 13.6      Platelet Count 314      % Neutrophils 56      % Lymphocytes 36      % Monocytes 7      % Eosinophils 1      % Basophils 0      % Immature Granulocytes 0      NRBCs per 100 WBC 0      Absolute Neutrophils 4.4      Absolute Lymphocytes 2.9      Absolute Monocytes 0.6      Absolute Eosinophils 0.1      Absolute Basophils 0.0      Absolute Immature Granulocytes 0.0      Absolute NRBCs 0.0     GLUCOSE BY METER - Abnormal    GLUCOSE BY METER POCT 222 (*)    TROPONIN T, HIGH SENSITIVITY - Normal    Troponin T, High Sensitivity <6     HCG QUANTITATIVE PREGNANCY - Normal    hCG Quantitative <1     INR - Normal    INR 1.04     MAGNESIUM - Normal    Magnesium 1.7     GLUCOSE MONITOR NURSING POCT   GLUCOSE MONITOR NURSING POCT   TYPE AND SCREEN, ADULT    ABO/RH(D) O POS      Antibody Screen Negative      SPECIMEN EXPIRATION DATE [id number]     PREPARE RED BLOOD CELLS (UNIT)    Blood Component Type Red Blood Cells      Product Code U6536G53      Unit Status Transfused      Unit Number [id number]      CROSSMATCH Compatible      CODING SYSTEM [id number]      ISSUE DATE AND TIME [id number]      UNIT ABO/RH O+      UNIT TYPE ISBT 5100     PREPARE RED BLOOD CELLS (UNIT)    Blood Component Type Red Blood Cells      Product Code M3667Y58      Unit Status Transfused      Unit Number [id number]      CROSSMATCH Compatible      CODING SYSTEM [id number]      ISSUE DATE AND TIME [id number]      UNIT ABO/RH O+      UNIT TYPE ISBT 5100     PREPARE RED BLOOD CELLS (UNIT)   TRANSFUSE RED BLOOD CELLS (UNIT)   TRANSFUSE RED BLOOD CELLS (UNIT)   ABO/RH TYPE AND SCREEN        Emergency Department Course & Assessments:         Interventions:  Medications   acetaminophen (TYLENOL) tablet 650 mg (has no administration in time range)   amLODIPine (NORVASC) tablet 5 mg (has no administration in time range)   atorvastatin (LIPITOR) tablet 10  mg (has no administration in time range)   lidocaine 1 % 0.1-1 mL (has no administration in time range)   lidocaine (LMX4) cream (has no administration in time range)   sodium chloride (PF) 0.9% PF flush 3 mL (has no administration in time range)   sodium chloride (PF) 0.9% PF flush 3 mL (has no administration in time range)   melatonin tablet 1 mg (has no administration in time range)   sodium chloride 0.9% infusion (has no administration in time range)   senna-docusate (SENOKOT-S/PERICOLACE) 8.6-50 MG per tablet 1 tablet (has no administration in time range)     Or   senna-docusate (SENOKOT-S/PERICOLACE) 8.6-50 MG per tablet 2 tablet (has no administration in time range)   ondansetron (ZOFRAN ODT) ODT tab 4 mg (has no administration in time range)     Or   ondansetron (ZOFRAN) injection 4 mg (has no administration in time range)   glucose gel 15-30 g (has no administration in time range)     Or   dextrose 50 % injection 25-50 mL (has no administration in time range)     Or   glucagon injection 1 mg (has no administration in time range)   insulin aspart (NovoLOG) injection (RAPID ACTING) (has no administration in time range)   insulin aspart (NovoLOG) injection (RAPID ACTING) (has no administration in time range)   0.9% sodium chloride BOLUS (0 mLs Intravenous Stopped 5/18/23 1815)   0.9% sodium chloride BOLUS (0 mLs Intravenous Stopped 5/18/23 2040)   norgestrel-ethinyl estradiol (LO/OVRAL) 0.3-30 MG-MCG per tablet 1 tablet (1 tablet Oral $Given 5/18/23 2242)      Assessments, Independent Interpretation, Consult/Discussion of ManagementTests:    ED Course as of 05/19/23 0025   Thu May 18, 2023   1710 I examined the patient and obtained history as noted above.    1816 Hemoglobin(!!): 5.5  Noted anemia. Will consent for transfusion.   2005 I spoke with Dr. Mcclellan, hospitalist, regarding admission of the patient.    2019 D/W Dr. Oreilly, Gynecology   2021 D/W Dr. Cronin (OB-Gyn). Can start any birth control.      Social Determinants of Health affecting care:  None       Disposition:  The patient was admitted to the hospital under the care of Dr. Mcclellan.     Impression & Plan    CMS Diagnoses: None    Code Status: Full Code    Medical Decision Making:    This 53-year-old female patient presents to the ED due to vaginal bleeding.  Please see HPI and exam for specifics.  The patient notes she is perimenopausal and while she has had heavy periods before they have not been this heavy.  She noted bleeding  has decreased today but she still feels very lightheaded and pale.  She had also recounted a couple episodes of fainting or nearly fainting.  Her work-up as noted above and, unsurprisingly, is significant for anemia.  Blood transfusion has been ordered.  Ultrasound imaging is notable for heterogeneous lesion with a differential as above.     At this time, given patient's symptoms and anemia, I think that hospitalization is reasonable.  I discussed the case with the hospitalist and we will plan to bring the patient in for ongoing monitoring and care    Critical Care time:  was 0 minutes for this patient excluding procedures.    Diagnosis:    ICD-10-CM    1. Abnormal uterine bleeding  N93.9       2. Anemia due to blood loss, acute  D62            Discharge Medications:  New Prescriptions    No medications on file      Scribe Disclosure:  I, Michael Mckeon, am serving as a scribe at 5:14 PM on 5/18/2023 to document services personally performed by Joaquin Corona DO based on my observations and the provider's statements to me.     5/18/2023   Joaquin Corona DO       Historical Data:  ______________________________________________________________________  Medications:    amLODIPine (NORVASC) 5 MG tablet  atorvastatin (LIPITOR) 10 MG tablet  carboxymethylcellulose PF (REFRESH PLUS) 0.5 % ophthalmic solution  CINNAMON PO  glimepiride (AMARYL) 2 MG tablet  ibuprofen (ADVIL/MOTRIN) 200 MG tablet  JUNEL FE 1/20  1-20 MG-MCG tablet  metFORMIN (GLUCOPHAGE) 500 MG tablet  MULTIVITAMINS PO TABS  ACE/ARB/ARNI NOT PRESCRIBED (INTENTIONAL)  blood glucose monitoring (ACCU-CHEK BECKY PLUS) meter device kit        Past Medical History:   Past Surgical History:     Past Medical History:   Diagnosis Date     Abnormal Pap smear of cervix 03/31/2022     Cervical high risk HPV (human papillomavirus) test positive 11/23/2018     Fatty liver 05/21/2012     GBS (group B streptococcus) infection 09/04/2008     gestational diabetes      Gestational diabetes mellitus, antepartum 03/09/2009     H/O colposcopy with cervical biopsy 07/08/2021     Hyperlipidemia LDL goal < 130 07/13/2010     Liver function test abnormality 07/13/2010     Pregnancies, high-risk 03/19/2009    Past Surgical History:   Procedure Laterality Date     BIOPSY BREAST  2012    needle bx benign     HC REMOVAL OF TONSILS,12+ Y/O  2001    Tonsils 12+y.o.     [facility] EXCISE HAND/FOOT NEUROMA        Patient Active Problem List    Diagnosis Date Noted     Abnormal uterine bleeding 05/18/2023     Priority: Medium     Anemia due to blood loss, acute 05/18/2023     Priority: Medium     Dysplasia of cervix, low grade (LOGAN 1) 07/08/2021     Priority: Medium     3/14/2003 NIL pap  4/9/2004 NIL pap  4/25/2005 NIL pap  5/3/2006 NIL pap  12/27/2007 NIL pap  6/18/2009 NIL pap  6/24/2010 NIL pap  8/4/2011 NIL pap  8/30/2012 NIL pap  9/26/2013 NIL pap  10/24/2016 NIL pap, Neg HPV  11/23/18 NIL pap, + HR HPV (not 16/18). Plan 1 year cotest  4/16/20 NIL pap, neg HR HPV. Plan 3 year cotest  5/13/21 NIL pap, + HR HPV 16. Plan colp bef 8/13/21 7/8/21 [place] Bx & ECC: LOGAN 1. Plan follow up visit to discuss  7/16/21 Follow up visit: Plan per provider: Cotest in 6 months (due 1/8/22)   3/31/22 ASCUS pap, neg HPV. Per provider, plan colp due before 6/30/22.  9/22/22 COLP- Atypia. ECC- negative for dysplasia. NIL pap, HPV processing. Plan  repeat a pap with cotesting in 6 months and if that is normal then  go back to yearly pap and cotesting.  If abnl consider repeat colp per provider .   9/28/22 LM for pt to call me back. Pt sent results via [employer]. Seen by patient Theresa Quarles on 9/29/2022 12:25 PM  9/29/22 LM for pt to call me back.  10/3 /22 Results of colp sent via [employer].  10/7/22 LM for pt to check mychart or call me back  10/12/22 LM for pt to call me back or check mychart  10/14/22 Result letter mailed with result and recommendation from colp. Unable to reach pt by phone and she has not checked her mychart.   3/8/23 Reminder letter  4/6/23 Reminder call- LM  5/5/23 Reminder pushed out to one year aliya           Cervical high risk HPV (human papillomavirus) test positive 11/23/2018     Priority: Medium     Type 2 diabetes mellitus without complication, without long-term current use of insulin (H) 11/01/2016     Priority: Medium     Pyelonephritis 04/30/2015     Priority: Medium     Fatty liver 05/21/2012     Priority: Medium     Other peripheral enthesopathies 11/18/2011     Priority: Medium     Foot joint pain 10/17/2011     Priority: Medium     Abnormality of gait 10/17/2011     Priority: Medium     Butler's metatarsalgia, neuralgia, or neuroma 08/04/2011     Priority: Medium     Plans surgery 8/11       Liver function test abnormality 07/13/2010     Priority: Medium     Hyperlipidemia LDL goal <100 07/13/2010     Priority: Medium     Diagnosis updated by automated process. Provider to review and confirm.            Family History:    family history includes Appendicitis in her daughter; Cancer - colorectal in her mother; Cerebrovascular Disease in her father; Thyroid Disease in her mother. Social History:   reports that she has never smoked. She has never used smokeless tobacco. She reports that she does not drink alcohol and does not use drugs.     PCP: Aliya Smith Bradley Joseph, DO  05/19/23 0026

## 2023-05-18 NOTE — ED TRIAGE NOTES
vaginal bleeding since last Monday. Multiple episodes of syncope at home. Patient is pale, weak and diaphoretic in triage. Patient reports changing pad every 20 minutes with clots the size of tennis balls.

## 2023-05-19 ENCOUNTER — TELEPHONE (OUTPATIENT)
Dept: OBGYN | Facility: CLINIC | Age: 53
End: 2023-05-19

## 2023-05-19 VITALS
RESPIRATION RATE: 16 BRPM | SYSTOLIC BLOOD PRESSURE: 132 MMHG | BODY MASS INDEX: 22.86 KG/M2 | TEMPERATURE: 97.6 F | WEIGHT: 129 LBS | HEIGHT: 63 IN | HEART RATE: 95 BPM | DIASTOLIC BLOOD PRESSURE: 82 MMHG | OXYGEN SATURATION: 98 %

## 2023-05-19 LAB
ERYTHROCYTE [DISTWIDTH] IN BLOOD BY AUTOMATED COUNT: 13.7 % (ref 10–15)
GLUCOSE BLDC GLUCOMTR-MCNC: 120 MG/DL (ref 70–99)
GLUCOSE BLDC GLUCOMTR-MCNC: 153 MG/DL (ref 70–99)
GLUCOSE BLDC GLUCOMTR-MCNC: 186 MG/DL (ref 70–99)
GLUCOSE BLDC GLUCOMTR-MCNC: 222 MG/DL (ref 70–99)
HCT VFR BLD AUTO: 26.1 % (ref 35–47)
HGB BLD-MCNC: 8.9 G/DL (ref 11.7–15.7)
MAGNESIUM SERPL-MCNC: 1.6 MG/DL (ref 1.7–2.3)
MCH RBC QN AUTO: 31 PG (ref 26.5–33)
MCHC RBC AUTO-ENTMCNC: 34.1 G/DL (ref 31.5–36.5)
MCV RBC AUTO: 91 FL (ref 78–100)
PLATELET # BLD AUTO: 262 10E3/UL (ref 150–450)
POTASSIUM SERPL-SCNC: 3.6 MMOL/L (ref 3.4–5.3)
RBC # BLD AUTO: 2.87 10E6/UL (ref 3.8–5.2)
WBC # BLD AUTO: 6.7 10E3/UL (ref 4–11)

## 2023-05-19 PROCEDURE — 82962 GLUCOSE BLOOD TEST: CPT

## 2023-05-19 PROCEDURE — 250N000013 HC RX MED GY IP 250 OP 250 PS 637: Performed by: INTERNAL MEDICINE

## 2023-05-19 PROCEDURE — 85014 HEMATOCRIT: CPT | Performed by: INTERNAL MEDICINE

## 2023-05-19 PROCEDURE — 84132 ASSAY OF SERUM POTASSIUM: CPT | Performed by: INTERNAL MEDICINE

## 2023-05-19 PROCEDURE — 99239 HOSP IP/OBS DSCHRG MGMT >30: CPT | Performed by: INTERNAL MEDICINE

## 2023-05-19 PROCEDURE — 83735 ASSAY OF MAGNESIUM: CPT | Performed by: INTERNAL MEDICINE

## 2023-05-19 PROCEDURE — 258N000003 HC RX IP 258 OP 636: Performed by: INTERNAL MEDICINE

## 2023-05-19 PROCEDURE — 99222 1ST HOSP IP/OBS MODERATE 55: CPT | Performed by: FAMILY MEDICINE

## 2023-05-19 PROCEDURE — 36415 COLL VENOUS BLD VENIPUNCTURE: CPT | Performed by: INTERNAL MEDICINE

## 2023-05-19 RX ORDER — DEXTROSE MONOHYDRATE 25 G/50ML
25-50 INJECTION, SOLUTION INTRAVENOUS
Status: DISCONTINUED | OUTPATIENT
Start: 2023-05-19 | End: 2023-05-19 | Stop reason: HOSPADM

## 2023-05-19 RX ORDER — NICOTINE POLACRILEX 4 MG
15-30 LOZENGE BUCCAL
Status: DISCONTINUED | OUTPATIENT
Start: 2023-05-19 | End: 2023-05-19 | Stop reason: HOSPADM

## 2023-05-19 RX ORDER — DESOGESTREL AND ETHINYL ESTRADIOL 0.15-0.03
1 KIT ORAL DAILY
Qty: 90 TABLET | Refills: 1 | Status: SHIPPED | OUTPATIENT
Start: 2023-05-19 | End: 2023-05-19

## 2023-05-19 RX ADMIN — ATORVASTATIN CALCIUM 10 MG: 10 TABLET, FILM COATED ORAL at 08:22

## 2023-05-19 RX ADMIN — AMLODIPINE BESYLATE 5 MG: 5 TABLET ORAL at 00:36

## 2023-05-19 RX ADMIN — SODIUM CHLORIDE: 9 INJECTION, SOLUTION INTRAVENOUS at 00:38

## 2023-05-19 ASSESSMENT — ACTIVITIES OF DAILY LIVING (ADL)
ADLS_ACUITY_SCORE: 37

## 2023-05-19 NOTE — PLAN OF CARE
"Pt a/o x4. VSS. Denies pain. C/o urinary frequency. Pt having vaginal bloody discharge. SBA in room. OB/Gyn saw pt today, plan to see pt outpatient. Stopped IV fluids. Removed both IV's. Went over discharge instructions with pt. Pt verbalized understanding. All questions answered. Medication sent home with pt. All belongings sent home with pt. Pt discharged home with .     /82   Pulse 95   Temp 97.6  F (36.4  C) (Oral)   Resp 16   Ht 1.6 m (5' 3\")   Wt 58.5 kg (129 lb)   LMP 05/12/2023   SpO2 98%   BMI 22.85 kg/m                            "

## 2023-05-19 NOTE — PROGRESS NOTES
"BP (!) 148/81   Pulse 105   Temp 98.4  F (36.9  C) (Oral)   Resp 16   Ht 1.6 m (5' 3\")   Wt 58.5 kg (129 lb)   LMP 05/12/2023   SpO2 99%   BMI 22.85 kg/m      Vitals stable. Pt alert and oriented x4. SBA for increased safety. Denies lightheadedness/dizziness/SOB. Blood infusing (second unit), tolerating well. Pt reports vaginal bleeding has slowed, reports just spotting. Plan for hgb re-check in the am. Will continue to provide supportive cares.   "

## 2023-05-19 NOTE — TELEPHONE ENCOUNTER
Call to pt, pt is still in hospital and knows to call and make f/up appt once discharged.    CATALINO Peterson

## 2023-05-19 NOTE — TELEPHONE ENCOUNTER
Please call patient and have her schedule appointment for ER follow-up for anemia/bleeding, She has seen Dr. Arriaza in past and also could see anyone available, as she should be see quickly.   Dr. Melva Teixeira, DO    Obstetrics and Gynecology  [facility] [place] [facility] [place]

## 2023-05-19 NOTE — DISCHARGE SUMMARY
[facility]  Hospitalist Discharge Summary      Date of Admission:  5/18/2023  Date of Discharge:  5/19/2023  Discharging Provider: Maximiliano Fontana MD  Discharge Service: Hospitalist Service    Discharge Diagnoses       Clinically Significant Risk Factors     # DMII: A1C = N/A within past 6 months       Follow-ups Needed After Discharge   Follow-up Appointments     Follow-up and recommended labs and tests       Follow up with primary care provider, Arsenio Smith, within 7 days for   hospital follow- up.  The following labs/tests are recommended: Hb.  Follow up with OB-GYN as scheduled.             Unresulted Labs Ordered in the Past 30 Days of this Admission     No orders found for last 31 day(s).          Discharge Disposition   Discharged to home  Condition at discharge: Stable    Hospital Course      Theresa Quarles is a 53 year old very menopausal female, history of hypertension, non-insulin-requiring diabetes mellitus, dyslipidemia, prior abnormal uterine bleeding documented in 2018 but patient has no recollection of any major issues with vaginal bleeding), prior abnormal Pap smear with high risk HPV test positive back in May 2021, previously also underwent colposcopy and cervical biopsy in 2021, on maintenance daily baby aspirin, who has a presents in the emergency room today after she was referred from the outpatient clinic due to concerns of heavy vaginal bleeding and concerns for underlying and accompanying symptomatic anemia.   It was reported that she has been having intermittent heavy menometrorrhagia at least in the last several weeks but more pronounced and worse in the past several days up to a point that she was utilizing frequent use of sanitary napkins every half an hour at least in the last 3 to 4 days.  There was also report of a syncopal event that occurred yesterday and also today but no reported significant obvious injury or head trauma.    Problem list:  #Symptomatic  anemia requiring packed RBC transfusion  #Menometrorrhagia with abnormal uterine bleeding leading to #1  #Syncope secondary to hypovolemia from symptomatic anemia    -Admit as inpatient.  At risk for clinical deterioration  -In the process of receiving 2 units of packed RBC being arranged in the emergency room service  -Formal gynecology input requested.  I was informed that ED service will be discussing her case with GYN service  -Keep her n.p.o. after midnight until seen by gynecology service  -Supportive care  -IV fluid support  -Stop her aspirin  -No chemo DVT prophylaxis, ambulate as much she can tolerate and PCD's if staying mostly in bed    #Hypertension  -Resume her amlodipine  -Reportedly intolerant to ACE inhibitors    #Non-insulin-requiring diabetes mellitus  -Hold her metformin and sulfonylurea  -Insulin sliding scale for now, plans for her to be n.p.o. effective midnight    Afebrile and HD stable. S/p 2 units PRBC's. Repeat Hb 8.9. pt aware of U/S findings and will follow up with OB-GYN as out-pt.        Consultations This Hospital Stay   GYNECOLOGY IP CONSULT    Code Status   Full Code    Time Spent on this Encounter   I, Maximliiano Fontana MD, personally saw the patient today and spent greater than 30 minutes discharging this patient.       Maximiliano Fontana MD  [facility] EMERGENCY DEPT  [address] [place] [address]  Phone: [phone]  Fax: [phone]  ______________________________________________________________________    Physical Exam   Vital Signs: Temp: 97.6  F (36.4  C) Temp src: Oral BP: 132/82 Pulse: 95   Resp: 16 SpO2: 98 % O2 Device: None (Room air)    Weight: 129 lbs 0 oz    Gen - AAO x 3 in NAD.  Lungs - CTA B.  HEart - RR,S1+S2 nml, no m/g/r.  Abd - soft, NT, ND, + BS.  Ext - no edema.       Primary Care Physician   Arsenio Smith    Discharge Orders      Follow-up and recommended labs and tests     Follow up with primary care provider, Arsenio Smith,  within 7 days for hospital follow- up.  The following labs/tests are recommended: Hb.  Follow up with OB-GYN as scheduled.     Activity    Your activity upon discharge: activity as tolerated     Diet    Follow this diet upon discharge: Moderate Consistent Carb (60 g CHO per Meal) Diet       Significant Results and Procedures   Results for orders placed or performed during the hospital encounter of 05/18/23   US Pelvic Complete w Transvaginal    Narrative    EXAM: US PELVIC TRANSABDOMINAL AND TRANSVAGINAL  LOCATION: [facility]  DATE/TIME: 5/18/2023 7:14 PM CDT    INDICATION: Abnormal uterine bleeding.  COMPARISON: 01/17/2019.  TECHNIQUE: Transabdominal scans were performed. Endovaginal ultrasound was performed to better visualize the adnexa.    FINDINGS:    UTERUS: 9.8 x 5.6 x 4.5 cm. Heterogeneous, vascularized lesion within the endometrial cavity, measuring 1.4 x 1.6 x 1.8 cm.    ENDOMETRIUM: 20 mm. See above.    RIGHT OVARY: 3.2 x 1.8 x 1.3 cm. Normal.    LEFT OVARY: 3.0 x 2.1 x 1.9 cm. Simple appearing cyst measures up to 1.7 cm, within physiologic limits of normal for size.    No significant free fluid.      Impression    IMPRESSION:    Heterogeneous, vascularized lesion within the endometrial cavity, measuring up to 1.8 cm. Primary differential consideration would include an endometrial polyp, though submucosal fibroid or endometrial carcinoma cannot be excluded. Sonohysterography   follow-up is recommended.       Discharge Medications   Current Discharge Medication List      START taking these medications    Details   ferrous sulfate (SLO-FE) 142 (45 Fe) MG CR tablet Take 1 tablet (142 mg) by mouth daily  Qty: 90 tablet, Refills: 3    Associated Diagnoses: Abnormal uterine bleeding; Anemia due to blood loss, acute         CONTINUE these medications which have NOT CHANGED    Details   amLODIPine (NORVASC) 5 MG tablet TAKE 1 TABLET DAILY  Qty: 90 tablet, Refills: 1    Associated Diagnoses:  Type 2 diabetes mellitus without complication, without long-term current use of insulin (H); Benign essential hypertension      atorvastatin (LIPITOR) 10 MG tablet Take 1 tablet (10 mg) by mouth daily  Qty: 90 tablet, Refills: 3    Associated Diagnoses: Hyperlipidemia LDL goal <100      carboxymethylcellulose PF (REFRESH PLUS) 0.5 % ophthalmic solution Place 1 drop into both eyes 3 times daily as needed for dry eyes      CINNAMON PO Take 1 tablet by mouth daily      glimepiride (AMARYL) 2 MG tablet TAKE 1 TABLET EVERY MORNINGBEFORE BREAKFAST  Qty: 90 tablet, Refills: 3    Associated Diagnoses: Type 2 diabetes mellitus without complication, without long-term current use of insulin (H)      JUNEL FE 1/20 1-20 MG-MCG tablet TAKE 1 TABLET DAILY  Qty: 84 tablet, Refills: 0    Comments: Please advise pt they are due for a fasting lab appointment and appointment with their provider for further refills.  Associated Diagnoses: Abnormal uterine bleeding (AUB)      metFORMIN (GLUCOPHAGE) 500 MG tablet TAKE 2 TABLETS (1000MG) TWOTIMES A DAY WITH MEALS  Qty: 360 tablet, Refills: 0    Comments: Please advise pt they are due for a fasting lab appointment and appointment with their provider for further refills.  Associated Diagnoses: Type 2 diabetes mellitus without complication, without long-term current use of insulin (H)      MULTIVITAMINS PO TABS ONE DAILY  Qty: 100 Tab, Refills: 3    Associated Diagnoses: Paresthesia      ACE/ARB/ARNI NOT PRESCRIBED (INTENTIONAL) Please choose reason not prescribed from choices below.    Associated Diagnoses: Type 2 diabetes mellitus without complication, without long-term current use of insulin (H)      blood glucose monitoring (ACCU-CHEK BECKY PLUS) meter device kit Use to test blood sugars 3 times daily or as directed with appropriate meter test strips and lancets and ETOH swabs, # 1 month, refill times 11  Qty: 1 kit, Refills: 5    Associated Diagnoses: Type 2 diabetes mellitus without  complication, without long-term current use of insulin (H)         STOP taking these medications       acetaminophen (TYLENOL) 325 MG tablet Comments:   Reason for Stopping:         ibuprofen (ADVIL/MOTRIN) 200 MG tablet Comments:   Reason for Stopping:             Allergies   Allergies   Allergen Reactions     Lisinopril Hives     Noted angioedema

## 2023-05-19 NOTE — PHARMACY-ADMISSION MEDICATION HISTORY
Pharmacist Admission Medication History    Admission medication history is complete. The information provided in this note is only as accurate as the sources available at the time of the update.    Medication reconciliation/reorder completed by provider prior to medication history? Yes    Information Source(s): Patient via in-person    Pertinent Information: None    Changes made to PTA medication list:    Added: ibuprofen, Refresh eye drops    Deleted: Tylenol, ASA    Changed: added sig to cinnamon       Allergies reviewed with patient and updates made in EHR: yes    Medication History Completed By: Ai Chirinos [facility] 5/18/2023 8:31 PM    Prior to Admission medications    Medication Sig Last Dose Taking? Auth Provider Long Term End Date   amLODIPine (NORVASC) 5 MG tablet TAKE 1 TABLET DAILY 5/17/2023 at pm Yes Arsenio Smith MD Yes    atorvastatin (LIPITOR) 10 MG tablet Take 1 tablet (10 mg) by mouth daily 5/18/2023 at am Yes Arsenio Smith MD Yes    carboxymethylcellulose PF (REFRESH PLUS) 0.5 % ophthalmic solution Place 1 drop into both eyes 3 times daily as needed for dry eyes prn at prn Yes Unknown, Entered By History     CINNAMON PO Take 1 tablet by mouth daily 5/18/2023 at am Yes Reported, Patient Yes    glimepiride (AMARYL) 2 MG tablet TAKE 1 TABLET EVERY MORNINGBEFORE BREAKFAST 5/18/2023 at am Yes Asrenio Smith MD Yes    ibuprofen (ADVIL/MOTRIN) 200 MG tablet Take 800 mg by mouth every 4 hours as needed for pain prn at prn Yes Unknown, Entered By History     JUNEL FE 1/20 1-20 MG-MCG tablet TAKE 1 TABLET DAILY 5/18/2023 at am Yes Arsenio Smith MD Yes    metFORMIN (GLUCOPHAGE) 500 MG tablet TAKE 2 TABLETS (1000MG) TWOTIMES A DAY WITH MEALS 5/18/2023 at x1 Yes Arsenio Smith MD Yes    MULTIVITAMINS PO TABS ONE DAILY 5/18/2023 at am Yes Arsenio Smith MD     ACE/ARB/ARNI NOT PRESCRIBED (INTENTIONAL) Please choose reason not prescribed from choices below.   Arsenio Smith MD Yes    blood glucose  monitoring (ACCU-CHEK BECKY PLUS) meter device kit Use to test blood sugars 3 times daily or as directed with appropriate meter test strips and lancets and ETOH swabs, # 1 month, refill times 11   Arsenio Smith MD

## 2023-05-19 NOTE — DISCHARGE INSTRUCTIONS
Follow up in 1-2 weeks   Call [phone] for concerns, this will get you to the answering service for the on-call doctor.     Things to be concerned with is: uncontrolled vomiting, bleeding that is more than spotting, Also fever or general unwell feeling or increased pain.     Also it is ok to please also call for any reason or concern!     Dr. Mevla Teixeira, DO    OB/GYN   [facility]

## 2023-05-19 NOTE — PROGRESS NOTES
Bj messaged Dr. Fontana @1413 if he still wanted pt to receive insulin while NPO. Current order is only to give insulin with meals. BS was 153.

## 2023-05-19 NOTE — CONSULTS
SUBJECTIVE:  Theresa Quarles is an 53 year old   woman who presents for   gynecology consult for abnormal uterine bleeding with endometrial polyp/fibroid on ultrasound,   With severe anemia treated with transfusion, managed by the hospitalist. Started on Oral Contraceptive overnight   Which has resolved the bleeding.  Her hemoglobin is now 8 and she presented to the ER with a hemoglobin of 5.    She has had heavy bleeding for 2 years and this week it became very heavy, she presented to the ER to be evaluated and treated.       Patient's last menstrual period was 2023. Periods are regular q 28-30 days, lasting   5 days. Menarche @ age teenager, Dysmenorrhea:moderate, occurring premenstrually and first 1-2 days of flow. Cyclic symptoms   include none. No intermenstrual bleeding,   spotting, or discharge. She has had heavy bleeding for 2 years and this week it became very heavy, she presented to the ER to be evaluated and treated.           Past Medical History:   Diagnosis Date     Abnormal Pap smear of cervix 2022     Cervical high risk HPV (human papillomavirus) test positive 2018     Fatty liver 2012     GBS (group B streptococcus) infection 2008     gestational diabetes      Gestational diabetes mellitus, antepartum 2009     H/O colposcopy with cervical biopsy 2021     Hyperlipidemia LDL goal < 130 2010     Liver function test abnormality 2010     Pregnancies, high-risk 2009          Family History   Problem Relation Age of Onset     Thyroid Disease Mother         s/p thyrodectomy     Cancer - colorectal Mother         rectal cancer     Cerebrovascular Disease Father              Appendicitis Daughter        Past Surgical History:   Procedure Laterality Date     BIOPSY BREAST  2012    needle bx benign     HC REMOVAL OF TONSILS,12+ Y/O      Tonsils 14+y.o.     ZZHC EXCISE HAND/FOOT NEUROMA         Current Facility-Administered  Medications   Medication     acetaminophen (TYLENOL) tablet 650 mg     amLODIPine (NORVASC) tablet 5 mg     atorvastatin (LIPITOR) tablet 10 mg     glucose gel 15-30 g    Or     dextrose 50 % injection 25-50 mL    Or     glucagon injection 1 mg     insulin aspart (NovoLOG) injection (RAPID ACTING)     lidocaine (LMX4) cream     lidocaine 1 % 0.1-1 mL     melatonin tablet 1 mg     ondansetron (ZOFRAN ODT) ODT tab 4 mg    Or     ondansetron (ZOFRAN) injection 4 mg     senna-docusate (SENOKOT-S/PERICOLACE) 8.6-50 MG per tablet 1 tablet    Or     senna-docusate (SENOKOT-S/PERICOLACE) 8.6-50 MG per tablet 2 tablet     sodium chloride (PF) 0.9% PF flush 3 mL     sodium chloride (PF) 0.9% PF flush 3 mL     sodium chloride 0.9% infusion     Current Outpatient Medications   Medication     amLODIPine (NORVASC) 5 MG tablet     atorvastatin (LIPITOR) 10 MG tablet     carboxymethylcellulose PF (REFRESH PLUS) 0.5 % ophthalmic solution     CINNAMON PO     glimepiride (AMARYL) 2 MG tablet     ibuprofen (ADVIL/MOTRIN) 200 MG tablet     JUNEL FE 1/20 1-20 MG-MCG tablet     metFORMIN (GLUCOPHAGE) 500 MG tablet     MULTIVITAMINS PO TABS     ACE/ARB/ARNI NOT PRESCRIBED (INTENTIONAL)     blood glucose monitoring (ACCU-CHEK BECKY PLUS) meter device kit     Allergies   Allergen Reactions     Lisinopril Hives     Noted angioedema       Social History     Tobacco Use     Smoking status: Never     Smokeless tobacco: Never   Vaping Use     Vaping status: Never Used   Substance Use Topics     Alcohol use: No       Review Of Systems  Ears/Nose/Throat: negative  Respiratory: No shortness of breath, dyspnea on exertion, cough, or hemoptysis  Cardiovascular: negative  Gastrointestinal: negative  Genitourinary: See HPI   Constitutional, HEENT, cardiovascular, pulmonary, GI, , musculoskeletal, neuro, skin, endocrine and psych systems are negative, except as otherwise noted.    OBJECTIVE:  /82   Pulse 95   Temp 97.6  F (36.4  C) (Oral)    "Resp 16   Ht 1.6 m (5' 3\")   Wt 58.5 kg (129 lb)   LMP 2023   SpO2 98%   BMI 22.85 kg/m    General appearance: healthy, alert and no distress  Skin: Skin color, texture, turgor normal. No rashes or lesions.  Ears: negative  Nose/Sinuses: Nares normal. Septum midline. Mucosa normal. No drainage or sinus tenderness.  Oropharynx: Lips, mucosa, and tongue normal. Teeth and gums normal.  Neck: Neck supple. No adenopathy. Thyroid symmetric, normal size,, Carotids without bruits.  Abdomen: Abdomen soft, non-tender. BS normal. No masses, organomegaly      ASSESSMENT:  Theresa Quarles is an 53 year old   woman who presents for   gynecology consult for abnormal uterine bleeding with endometrial polyp/fibroid on ultrasound,   With severe anemia treated with transfusion, managed by the hospitalist. Started on Oral Contraceptive overnight   Which has resolved the bleeding.  Her hemoglobin is now 8 and she presented to the ER with a hemoglobin of 5.    She has had heavy bleeding for 2 years and this week it became very heavy, she presented to the ER to be evaluated and treated.   PLAN:  Dx:  1)   Menorrhagia: Reviwed pelvic ultrasound and discussed treatment and endometrial sampling, discussed endometrial biopsy and dilation and curettage.      Recommend following up with [facility] for scheduling a hysteroscopy dilation and curettage with possible polypectomy.  Our office will call to schedule the follow-up visit.   In the meantime will discharge home on Oral Contraceptive to control her bleeding.         2)  Anemia:  rx for iron given.      3) ok to discharge home, appreciate consultation.     Labs were reviewed in Epic (hemoglobin)  Imaging was reviewed in Epic (ultrasound)   Tests and documents were reviewed. (ER notes)  Discussion of management or test interpretation  See above     35 minutes spent on the date of the encounter doing chart review, review of test results, interpretation of tests, patient " visit, documentation and discussion with family          Dr. Melva Teixeira, DO    Obstetrics and Gynecology  [facility] [place] [facility] [place]

## 2023-05-19 NOTE — PLAN OF CARE
"Care from [id number]  Inpatient Progress Note    /73 (BP Location: Left arm)   Pulse 93   Temp 97.6  F (36.4  C) (Oral)   Resp 16   Ht 1.6 m (5' 3\")   Wt 58.5 kg (129 lb)   LMP 05/12/2023   SpO2 98%   BMI 22.85 kg/m      A&O x4. VSS, RA. Up w/ SBA to A1. Pt has not been oob at this time. Purewick in place overnight, urine blood tinged from menses. Received at total of two units of blood, due for hgb recheck in the AM. NS running at 75 ml/hr. Pt states she is feeling better and does not c/o any pain or discomfort at this time. NPO for gyno consult. ACHs. K/Mg prot w/ AM rechecks. Pt sleeping between cares and is able to make needs known.                         "

## 2023-05-19 NOTE — H&P
[facility]    History and Physical - Hospitalist Service       Date of Admission:  5/18/2023    Assessment & Plan      Theresa Quarles is a 53 year old very menopausal female, history of hypertension, non-insulin-requiring diabetes mellitus, dyslipidemia, prior abnormal uterine bleeding documented in 2018 but patient has no recollection of any major issues with vaginal bleeding), prior abnormal Pap smear with high risk HPV test positive back in May 2021, previously also underwent colposcopy and cervical biopsy in 2021, on maintenance daily baby aspirin, who has a presents in the emergency room today after she was referred from the outpatient clinic due to concerns of heavy vaginal bleeding and concerns for underlying and accompanying symptomatic anemia.   It was reported that she has been having intermittent heavy menometrorrhagia at least in the last several weeks but more pronounced and worse in the past several days up to a point that she was utilizing frequent use of sanitary napkins every half an hour at least in the last 3 to 4 days.  There was also report of a syncopal event that occurred yesterday and also today but no reported significant obvious injury or head trauma.    Problem list:  #Symptomatic anemia requiring packed RBC transfusion  #Menometrorrhagia with abnormal uterine bleeding leading to #1  #Syncope secondary to hypovolemia from symptomatic anemia    -Admit as inpatient.  At risk for clinical deterioration  -In the process of receiving 2 units of packed RBC being arranged in the emergency room service  -Formal gynecology input requested.  I was informed that ED service will be discussing her case with GYN service  -Keep her n.p.o. after midnight until seen by gynecology service  -Supportive care  -IV fluid support  -Stop her aspirin  -No chemo DVT prophylaxis, ambulate as much she can tolerate and PCD's if staying mostly in bed    #Hypertension  -Resume her  amlodipine  -Reportedly intolerant to ACE inhibitors    #Non-insulin-requiring diabetes mellitus  -Hold her metformin and sulfonylurea  -Insulin sliding scale for now, plans for her to be n.p.o. effective midnight           Diet:  Regular diet tonight, n.p.o. effective after midnight  DVT Prophylaxis: Pneumatic Compression Devices  Niño Catheter: Not present  Lines: None     Cardiac Monitoring: None  Code Status:  Full    Clinically Significant Risk Factors Present on Admission                # Drug Induced Platelet Defect: home medication list includes an antiplatelet medication   # Hypertension: Home medication list includes antihypertensive(s)     # DMII: A1C = N/A within past 6 months             Disposition Plan      Expected Discharge Date: 05/20/2023                  Joby Srivastava MD, MD  Hospitalist Service  [facility]  Securely message with EMUZE (more info)  Text page via lettrs Paging/Directory     ______________________________________________________________________    Chief Complaint   Heavy vaginal bleeding please in the past several weeks but worse in the last several days  Intermittent lightheadedness and easy fatigability      History is obtained from the patient  Review of electronic medical records  Discussion with emergency room service  Patient's family present at bedside    History of Present Illness   Theresa Quarles is a 53 year old very menopausal female, history of hypertension, non-insulin-requiring diabetes mellitus, dyslipidemia, prior abnormal uterine bleeding, prior abnormal Pap smear with high risk HPV test positive back in May 2021, previously also underwent colposcopy and cervical biopsy in 2021, on maintenance daily baby aspirin, who has a presents in the emergency room today after she was referred from the outpatient clinic due to concerns of heavy vaginal bleeding and concerns for underlying and accompanying symptomatic anemia.   It was reported that  she has been having intermittent heavy menometrorrhagia at least in the last several weeks but more pronounced and worse in the past several days up to a point that she was utilizing frequent use of sanitary napkins every half an hour at least in the last 3 to 4 days.  There was also report of a syncopal event that occurred yesterday and also today but no reported significant obvious injury or head trauma.  No other bleeding tendencies such as bright red blood per rectum, coffee-ground emesis, black tarry stools or hematemesis.  She endorses no ongoing fevers, chest pain, abdominal pain, mental status changes, urinary nor fecal incontinence, back pain, focal weakness or any apparent seizure-like activity.   Upon presentation the emergency room after she was referred from the clinic she was found with stable hemodynamics on the hypertensive side with her blood pressure levels, tachycardic as expected, afebrile and not hypoxic.  She demonstrated pallor, pale conjunctive a and further diagnostics was pursued that confirmed severe anemia with hemoglobin of 5.5 g and hematocrit of 17.  She had a normal hemoglobin last year.  Platelets were normal.  Has a pending INR.  Metabolic panel showing reassuring creatinine, with normal BUN.  She is in the process of receiving packed RBC transfusion as ordered in the emergency room.  Pelvic sonogram did show heterogenous, vascular lysed lesion within the endometrial cavity measuring up to 1.8 cm.  I was informed by emergency room physician that her case will also be discussed with gynecology service will be awaiting further recommendations and assistance regarding this concerning symptomatic anemia with accompanying abnormal uterine bleeding.          Past Medical History    Past Medical History:   Diagnosis Date     Abnormal Pap smear of cervix 03/31/2022     Cervical high risk HPV (human papillomavirus) test positive 11/23/2018 5/13/21     Fatty liver 05/21/2012     GBS (group  B streptococcus) infection 09/04/2008     gestational diabetes      Gestational diabetes mellitus, antepartum 03/09/2009     H/O colposcopy with cervical biopsy 07/08/2021     Hyperlipidemia LDL goal < 130 07/13/2010     Liver function test abnormality 07/13/2010     Pregnancies, high-risk 03/19/2009       Past Surgical History   Past Surgical History:   Procedure Laterality Date     BIOPSY BREAST  2012    needle bx benign     HC REMOVAL OF TONSILS,12+ Y/O  2001    Tonsils 14+y.o.     ZZHC EXCISE HAND/FOOT NEUROMA         Prior to Admission Medications   Prior to Admission Medications   Prescriptions Last Dose Informant Patient Reported? Taking?   ACE/ARB/ARNI NOT PRESCRIBED (INTENTIONAL)   No No   Sig: Please choose reason not prescribed from choices below.   CINNAMON PO   Yes No   Sig: Take  by mouth.   JUNEL FE 1/20 1-20 MG-MCG tablet   No No   Sig: TAKE 1 TABLET DAILY   MULTIVITAMINS PO TABS   Yes No   Sig: ONE DAILY   acetaminophen (TYLENOL) 325 MG tablet   Yes No   Sig: Take 325-650 mg by mouth every 6 hours as needed for mild pain   amLODIPine (NORVASC) 5 MG tablet   No No   Sig: TAKE 1 TABLET DAILY   aspirin (ASA) 81 MG tablet   No No   Sig: Take 1 tablet (81 mg) by mouth daily   atorvastatin (LIPITOR) 10 MG tablet   No No   Sig: Take 1 tablet (10 mg) by mouth daily   blood glucose monitoring (ACCU-CHEK BECKY PLUS) meter device kit   No No   Sig: Use to test blood sugars 3 times daily or as directed with appropriate meter test strips and lancets and ETOH swabs, # 1 month, refill times 11   glimepiride (AMARYL) 2 MG tablet   No No   Sig: TAKE 1 TABLET EVERY MORNINGBEFORE BREAKFAST   metFORMIN (GLUCOPHAGE) 500 MG tablet   No No   Sig: TAKE 2 TABLETS (1000MG) TWOTIMES A DAY WITH MEALS      Facility-Administered Medications: None        Review of Systems    The 10 point Review of Systems is negative other than noted in the HPI or here.     Social History   I have reviewed this patient's social history and  updated it with pertinent information if needed.  Social History     Tobacco Use     Smoking status: Never     Smokeless tobacco: Never   Vaping Use     Vaping status: Never Used   Substance Use Topics     Alcohol use: No     Drug use: No       Allergies   Allergies   Allergen Reactions     Lisinopril Hives     Noted angioedema        Physical Exam   Vital Signs: Temp: 98.8  F (37.1  C) Temp src: Oral BP: (!) 155/81 Pulse: 109   Resp: 18 SpO2: 96 % O2 Device: None (Room air)    Weight: 129 lbs 0 oz    HEENT; Atraumatic, normocephalic, pale conjuctiva, pupils bilateral reactive   Skin: warm and moist, no rashes  Pallor  Lymphatics: no cervical or axillary lymphandenopathy  Lungs: equal chest expansion, clear to auscultation, no wheezes, no stridor, no crackles,   Heart: normal rate, normal rhythm, no rubs or gallops.   Abdomen: normal bowel sounds, no tenderness, no peritoneal signs, no guarding  Extremities: no deformities, no edema   Neuro; follow commands, alert and oriented x3, spontaneous speech, coherent, moves all extremities spontaneously  Psych; no hallucination, euthymic mood, not agitated        Medical Decision Making       50 MINUTES SPENT BY ME on the date of service doing chart review, history, exam, documentation & further activities per the note.  MANAGEMENT DISCUSSED with the following over the past 24 hours: yes   NOTE(S)/MEDICAL RECORDS REVIEWED over the past 24 hours: yes  Tests ORDERED & REVIEWED in the past 24 hours:      Data     I have personally reviewed the following data over the past 24 hrs:    7.9  \   5.5 (LL)   / 314     135 (L) 99 7.6 /  219 (H)   3.8 24 0.43 (L) \       Trop: <6 BNP: N/A       Imaging results reviewed over the past 24 hrs:   Recent Results (from the past 24 hour(s))   US Pelvic Complete w Transvaginal    Narrative    EXAM: US PELVIC TRANSABDOMINAL AND TRANSVAGINAL  LOCATION: [facility]  DATE/TIME: 5/18/2023 7:14 PM CDT    INDICATION: Abnormal  uterine bleeding.  COMPARISON: 01/17/2019.  TECHNIQUE: Transabdominal scans were performed. Endovaginal ultrasound was performed to better visualize the adnexa.    FINDINGS:    UTERUS: 9.8 x 5.6 x 4.5 cm. Heterogeneous, vascularized lesion within the endometrial cavity, measuring 1.4 x 1.6 x 1.8 cm.    ENDOMETRIUM: 20 mm. See above.    RIGHT OVARY: 3.2 x 1.8 x 1.3 cm. Normal.    LEFT OVARY: 3.0 x 2.1 x 1.9 cm. Simple appearing cyst measures up to 1.7 cm, within physiologic limits of normal for size.    No significant free fluid.      Impression    IMPRESSION:    Heterogeneous, vascularized lesion within the endometrial cavity, measuring up to 1.8 cm. Primary differential consideration would include an endometrial polyp, though submucosal fibroid or endometrial carcinoma cannot be excluded. Sonohysterography   follow-up is recommended.

## 2023-05-22 ENCOUNTER — PATIENT OUTREACH (OUTPATIENT)
Dept: INTERNAL MEDICINE | Facility: CLINIC | Age: 53
End: 2023-05-22

## 2023-05-22 ENCOUNTER — OFFICE VISIT (OUTPATIENT)
Dept: OBGYN | Facility: CLINIC | Age: 53
End: 2023-05-22
Payer: COMMERCIAL

## 2023-05-22 VITALS — SYSTOLIC BLOOD PRESSURE: 138 MMHG | DIASTOLIC BLOOD PRESSURE: 70 MMHG | WEIGHT: 128 LBS | BODY MASS INDEX: 22.67 KG/M2

## 2023-05-22 DIAGNOSIS — N93.9 ABNORMAL UTERINE BLEEDING: Primary | ICD-10-CM

## 2023-05-22 PROCEDURE — 99214 OFFICE O/P EST MOD 30 MIN: CPT | Performed by: OBSTETRICS & GYNECOLOGY

## 2023-05-22 NOTE — NURSING NOTE
"Chief Complaint   Patient presents with     Abnormal Uterine Bleeding     ED follow up-dizzy and a light headed occasionally        Initial /70   Wt 58.1 kg (128 lb)   LMP 2023   BMI 22.67 kg/m   Estimated body mass index is 22.67 kg/m  as calculated from the following:    Height as of 23: 1.6 m (5' 3\").    Weight as of this encounter: 58.1 kg (128 lb).  BP completed using cuff size: regular    Questioned patient about current smoking habits.  Pt. has never smoked.          The following HM Due: NONE      The following patient reported/Care Every where data was sent to:  P ABSTRACT QUALITY INITIATIVES [[id number]]        [facility]                 "

## 2023-05-22 NOTE — PROGRESS NOTES
HPI:  Theresa Quarles is a 53 year old [place] and female  Patient's last menstrual period was 2023.  Concern of possible menopause on Junel 1/20 who was seen in the emergency room on 2023 for evaluation of menorrhagia. It was reported that she had been having intermittent heavy menometrorrhagia   in the last several weeks prior but more pronounced and worse in the past several days up to a point that she was utilizing frequent use of sanitary napkins every half an hour at least in the last 3 to 4 days.  There was also report of a syncopal event that occurred the day prior and also on 2023 but no reported significant obvious injury or head trauma.  The patient has a history of hypertension, non-insulin-requiring diabetes mellitus, dyslipidemia, prior abnormal uterine bleeding documented in  but patient has no recollection of any major issues with vaginal bleeding), prior abnormal Pap smear with high risk HPV test positive back in May 2021, previously also underwent colposcopy and cervical biopsy in ,.  Patient was transfused with 2 units of packed cells and presents today for follow-up.  In the emergency room on 2023 the patient had a hemoglobin of 5.5.  Her most recent hemoglobin 3 days ago on 2023 was 8.9.  She denies vasovagal symptoms at this time and is on multivitamins with iron    Past Medical History:   Diagnosis Date     Abnormal Pap smear of cervix 2022     Cervical high risk HPV (human papillomavirus) test positive 2018     Fatty liver 2012     GBS (group B streptococcus) infection 2008     gestational diabetes      Gestational diabetes mellitus, antepartum 2009     H/O colposcopy with cervical biopsy 2021     Hyperlipidemia LDL goal < 130 2010     Liver function test abnormality 2010     Pregnancies, high-risk 2009     Past Surgical History:   Procedure Laterality Date     BIOPSY BREAST       needle bx benign     HC REMOVAL OF TONSILS,14+ Y/O      Tonsils 14+y.o.     [facility] EXCISE HAND/FOOT NEUROMA       Family History   Problem Relation Age of Onset     Thyroid Disease Mother         s/p thyrodectomy     Cancer - colorectal Mother         rectal cancer     Cerebrovascular Disease Father              Appendicitis Daughter      Social History     Socioeconomic History     Marital status:      Spouse name: Not on file     Number of children: Not on file     Years of education: Not on file     Highest education level: Not on file   Occupational History     Not on file   Tobacco Use     Smoking status: Never     Smokeless tobacco: Never   Vaping Use     Vaping status: Never Used   Substance and Sexual Activity     Alcohol use: No     Drug use: No     Sexual activity: Yes     Partners: Male     Birth control/protection: Pill   Other Topics Concern     Parent/sibling w/ CABG, MI or angioplasty before 65F 55M? Not Asked   Social History Narrative     Not on file     Social Determinants of Health     Financial Resource Strain: Not on file   Food Insecurity: Not on file   Transportation Needs: Not on file   Physical Activity: Not on file   Stress: Not on file   Social Connections: Not on file   Intimate Partner Violence: Not on file   Housing Stability: Not on file       Allergies:  Lisinopril    Current Outpatient Medications   Medication Sig Dispense Refill     ACE/ARB/ARNI NOT PRESCRIBED (INTENTIONAL) Please choose reason not prescribed from choices below.       amLODIPine (NORVASC) 5 MG tablet TAKE 1 TABLET DAILY 90 tablet 1     atorvastatin (LIPITOR) 10 MG tablet Take 1 tablet (10 mg) by mouth daily 90 tablet 3     blood glucose monitoring (ACCU-CHEK BECKY PLUS) meter device kit Use to test blood sugars 3 times daily or as directed with appropriate meter test strips and lancets and ETOH swabs, # 1 month, refill times 11 1 kit 5     carboxymethylcellulose PF (REFRESH PLUS) 0.5 % ophthalmic  solution Place 1 drop into both eyes 3 times daily as needed for dry eyes       CINNAMON PO Take 1 tablet by mouth daily       ferrous sulfate (SLO-FE) 142 (45 Fe) MG CR tablet Take 1 tablet (142 mg) by mouth daily 90 tablet 3     glimepiride (AMARYL) 2 MG tablet TAKE 1 TABLET EVERY MORNINGBEFORE BREAKFAST 90 tablet 3     JUNEL FE 1/20 1-20 MG-MCG tablet TAKE 1 TABLET DAILY 84 tablet 0     metFORMIN (GLUCOPHAGE) 500 MG tablet TAKE 2 TABLETS (1000MG) TWOTIMES A DAY WITH MEALS 360 tablet 0     MULTIVITAMINS PO TABS ONE DAILY 100 Tab 3       Review Of Systems   ROS: 10 point ROS neg other than the symptoms noted above in the HPI.    Exam:  /70   Wt 58.1 kg (128 lb)   LMP 05/12/2023   BMI 22.67 kg/m    {Constitutional: healthy, alert and no distress  Head: Normocephalic. No masses, lesions, tenderness or abnormalities  Neck: Neck supple. No adenopathy. Thyroid symmetric, normal size,, Carotids without bruits.  Cardiovascular: negative, PMI normal. No lifts, heaves, or thrills. RRR. No murmurs, clicks gallops or rub  Respiratory: negative, Percussion normal. Good diaphragmatic excursion. Lungs clear  Gastrointestinal: Abdomen soft, non-tender. BS normal. No masses, organomegaly  Pelvic ultrasound from 5/18/2023 shows heterogeneous endometrium with 20 mm of thickness and a vascularized lesion within the endometrial cavity measuring 1.4 x 1.6 x 1.8 cm suggestive of an endometrial polyp  Assessment/Plan:  (N93.9) Abnormal uterine bleeding  (primary encounter diagnosis)  Comment: Risks, benefits, and alternative modes of therapy discussed at length. Pathophysiology of the disease process reviewed, all of the patients questions answered and informed consent obtained.    Plan: I gave the patient and her  a detailed written outline of my concern.  I discussed stopping birth control pills due to her hypertension.  I recommended proceeding with a hysteroscopy D&C to evaluate the endometrial lining and filling  defect with appropriate therapy to follow.  I would like to do that prior to her next menses to minimize risk of heavy bleeding      Kraig Arriaza M.D.

## 2023-05-22 NOTE — TELEPHONE ENCOUNTER
What type of discharge? Inpatient  Risk of Hospital admission or ED visit: 7%  Is a TCM episode required? No  When should the patient follow up with PCP? within 30 days of discharge.    Charlee Florentino RN  [facility]

## 2023-05-22 NOTE — TELEPHONE ENCOUNTER
Patient Contact    Attempt # 1    Was call answered?  No.  Left message on voicemail with information to call me back.    On call back, please conduct ED/Hosp follow up with patient.

## 2023-05-22 NOTE — Clinical Note
Surgeon: Dr Kraig Arriaza Assist:  No Location: [facility] Date/time preference:  ASAP  Surgery: Exam under anesthesia hysteroscopy with the MyoSure device fractional D&C Length of Surgery: 1 hour Diagnosis: Abnormal uterine bleeding with profound anemia Anesthesia type:  GENERAL  Special instructions / equipment: MyoSure device Am admit or same day: SAME DAY Bowel prep: No Pre op: SURGEON Office visit with surgeon prior to surgery: Only if she has questions.  I did give her written outline of pre and postop instructions Schedule Post Op: 1-2 weeks

## 2023-05-22 NOTE — PATIENT INSTRUCTIONS
You can reach your [facility] Care Team any time of the day by calling [phone]. This number will put you in touch with the 24 hour nurse line if the clinic is closed.    To contact your OB/GYN Station Coordinator/Surgery Scheduler please call [phone]. This is a direct number for your care team between 8 a.m. and 4 p.m. Monday through Friday.    [facility] is open for your convenience:  Monday through Friday 8 a.m. to 6 p.m.  Closed weekends and all major holidays.

## 2023-05-23 ENCOUNTER — TELEPHONE (OUTPATIENT)
Dept: OBGYN | Facility: CLINIC | Age: 53
End: 2023-05-23
Payer: COMMERCIAL

## 2023-05-23 ENCOUNTER — PATIENT OUTREACH (OUTPATIENT)
Dept: CARE COORDINATION | Facility: CLINIC | Age: 53
End: 2023-05-23
Payer: COMMERCIAL

## 2023-05-23 ENCOUNTER — PREP FOR PROCEDURE (OUTPATIENT)
Dept: OBGYN | Facility: CLINIC | Age: 53
End: 2023-05-23
Payer: COMMERCIAL

## 2023-05-23 DIAGNOSIS — D64.9 ANEMIA: ICD-10-CM

## 2023-05-23 DIAGNOSIS — N93.9 ABNORMAL UTERINE BLEEDING: Primary | ICD-10-CM

## 2023-05-23 NOTE — TELEPHONE ENCOUNTER
Surgeon: Dr Kraig Arriaza   Assist:  No   Location: [facility]   Date/time preference:  ASAP     Surgery: Exam under anesthesia hysteroscopy with the MyoSure device fractional D&C   Length of Surgery: 1 hour   Diagnosis: Abnormal uterine bleeding with profound anemia   Anesthesia type:  GENERAL     Special instructions / equipment: MyoSure device   Am admit or same day: SAME DAY   Bowel prep: No   Pre op: SURGEON   Office visit with surgeon prior to surgery: Only if she has questions.  I did give her written outline of pre and postop instructions   Schedule Post Op: 1-2 weeks

## 2023-05-23 NOTE — TELEPHONE ENCOUNTER
Type of surgery: exam under anesthesia, hysteroscopy with the myosure device, fractional d&c  Location of surgery: [facility]  Date and time of surgery: 5/30/23 @ 2:20 pm  Surgeon: Dr. Arriaza  Pre-Op Appt Date: 5/22/23 by surgeon  Post-Op Appt Date: 6/9/23   Packet sent out: Yes  Pre-cert/Authorization completed:  No  Date: 5/23/23

## 2023-05-28 ENCOUNTER — HOSPITAL ENCOUNTER (EMERGENCY)
Facility: CLINIC | Age: 53
Discharge: HOME | End: 2023-05-28
Attending: EMERGENCY MEDICINE | Admitting: EMERGENCY MEDICINE
Payer: COMMERCIAL

## 2023-05-28 ENCOUNTER — NURSE TRIAGE (OUTPATIENT)
Dept: NURSING | Facility: CLINIC | Age: 53
End: 2023-05-28
Payer: COMMERCIAL

## 2023-05-28 VITALS
HEART RATE: 104 BPM | OXYGEN SATURATION: 99 % | RESPIRATION RATE: 20 BRPM | SYSTOLIC BLOOD PRESSURE: 158 MMHG | TEMPERATURE: 98 F | DIASTOLIC BLOOD PRESSURE: 91 MMHG

## 2023-05-28 DIAGNOSIS — N93.9 ABNORMAL VAGINAL BLEEDING: ICD-10-CM

## 2023-05-28 LAB
ABO/RH(D): NORMAL
ANION GAP SERPL CALCULATED.3IONS-SCNC: 16 MMOL/L (ref 7–15)
ANTIBODY SCREEN: NEGATIVE
BASOPHILS # BLD AUTO: 0.1 10E3/UL (ref 0–0.2)
BASOPHILS NFR BLD AUTO: 1 %
BUN SERPL-MCNC: 6 MG/DL (ref 6–20)
CALCIUM SERPL-MCNC: 9.2 MG/DL (ref 8.6–10)
CHLORIDE SERPL-SCNC: 97 MMOL/L (ref 98–107)
CREAT SERPL-MCNC: 0.4 MG/DL (ref 0.51–0.95)
DEPRECATED HCO3 PLAS-SCNC: 23 MMOL/L (ref 22–29)
EOSINOPHIL # BLD AUTO: 0.2 10E3/UL (ref 0–0.7)
EOSINOPHIL NFR BLD AUTO: 2 %
ERYTHROCYTE [DISTWIDTH] IN BLOOD BY AUTOMATED COUNT: 14.1 % (ref 10–15)
GFR SERPL CREATININE-BSD FRML MDRD: >90 ML/MIN/1.73M2
GLUCOSE SERPL-MCNC: 246 MG/DL (ref 70–99)
HCT VFR BLD AUTO: 30.5 % (ref 35–47)
HGB BLD-MCNC: 10 G/DL (ref 11.7–15.7)
HOLD SPECIMEN: NORMAL
HOLD SPECIMEN: NORMAL
IMM GRANULOCYTES # BLD: 0 10E3/UL
IMM GRANULOCYTES NFR BLD: 0 %
LYMPHOCYTES # BLD AUTO: 2 10E3/UL (ref 0.8–5.3)
LYMPHOCYTES NFR BLD AUTO: 30 %
MCH RBC QN AUTO: 30.2 PG (ref 26.5–33)
MCHC RBC AUTO-ENTMCNC: 32.8 G/DL (ref 31.5–36.5)
MCV RBC AUTO: 92 FL (ref 78–100)
MONOCYTES # BLD AUTO: 0.6 10E3/UL (ref 0–1.3)
MONOCYTES NFR BLD AUTO: 8 %
NEUTROPHILS # BLD AUTO: 4.1 10E3/UL (ref 1.6–8.3)
NEUTROPHILS NFR BLD AUTO: 59 %
NRBC # BLD AUTO: 0 10E3/UL
NRBC BLD AUTO-RTO: 0 /100
PLATELET # BLD AUTO: 500 10E3/UL (ref 150–450)
POTASSIUM SERPL-SCNC: 3.6 MMOL/L (ref 3.4–5.3)
RBC # BLD AUTO: 3.31 10E6/UL (ref 3.8–5.2)
SODIUM SERPL-SCNC: 136 MMOL/L (ref 136–145)
SPECIMEN EXPIRATION DATE: NORMAL
WBC # BLD AUTO: 6.9 10E3/UL (ref 4–11)

## 2023-05-28 PROCEDURE — 85025 COMPLETE CBC W/AUTO DIFF WBC: CPT | Performed by: EMERGENCY MEDICINE

## 2023-05-28 PROCEDURE — 80048 BASIC METABOLIC PNL TOTAL CA: CPT | Performed by: EMERGENCY MEDICINE

## 2023-05-28 PROCEDURE — 36415 COLL VENOUS BLD VENIPUNCTURE: CPT | Performed by: EMERGENCY MEDICINE

## 2023-05-28 PROCEDURE — 99283 EMERGENCY DEPT VISIT LOW MDM: CPT

## 2023-05-28 PROCEDURE — 86850 RBC ANTIBODY SCREEN: CPT | Performed by: EMERGENCY MEDICINE

## 2023-05-28 RX ORDER — MEDROXYPROGESTERONE ACETATE 10 MG
10 TABLET ORAL DAILY
Qty: 10 TABLET | Refills: 0 | Status: SHIPPED | OUTPATIENT
Start: 2023-05-28 | End: 2023-06-09

## 2023-05-28 NOTE — ED PROVIDER NOTES
History     Chief Complaint:  Vaginal Bleeding       The history is provided by the patient.      Theresa Quarles is a 53 year old female who presents with vaginal bleeding. The patient reports that she was recently admitted at [facility] for vaginal bleeding. She states that she is again experiencing vaginal bleeding. She states that today she has saturated sanitary pads every 30 minutes. She complains of light-headedness and dizziness. She called her primary physician today who told her to present to the ED today.     Independent Historian:   None - Patient Only    Review of External Notes:   I reviewed the patient's admission at [facility] on 5/18/23, see discharge summary below. Her last hemoglobin on 5/19/2023 was 8.9. During her hospitalization she received US imaging, see findings below.    Theresa Quarles is a 53 year old very menopausal female, history of hypertension, non-insulin-requiring diabetes mellitus, dyslipidemia, prior abnormal uterine bleeding documented in 2018 but patient has no recollection of any major issues with vaginal bleeding), prior abnormal Pap smear with high risk HPV test positive back in May 2021, previously also underwent colposcopy and cervical biopsy in 2021, on maintenance daily baby aspirin, who has a presents in the emergency room today after she was referred from the outpatient clinic due to concerns of heavy vaginal bleeding and concerns for underlying and accompanying symptomatic anemia.   It was reported that she has been having intermittent heavy menometrorrhagia at least in the last several weeks but more pronounced and worse in the past several days up to a point that she was utilizing frequent use of sanitary napkins every half an hour at least in the last 3 to 4 days.  There was also report of a syncopal event that occurred yesterday and also today but no reported significant obvious injury or head trauma.    EXAM: US PELVIC TRANSABDOMINAL AND  TRANSVAGINAL  LOCATION: [facility]  DATE/TIME: 5/18/2023 7:14 PM CDT     INDICATION: Abnormal uterine bleeding.  COMPARISON: 01/17/2019.  TECHNIQUE: Transabdominal scans were performed. Endovaginal ultrasound was performed to better visualize the adnexa.     FINDINGS:     UTERUS: 9.8 x 5.6 x 4.5 cm. Heterogeneous, vascularized lesion within the endometrial cavity, measuring 1.4 x 1.6 x 1.8 cm.    ENDOMETRIUM: 20 mm. See above.     RIGHT OVARY: 3.2 x 1.8 x 1.3 cm. Normal.     LEFT OVARY: 3.0 x 2.1 x 1.9 cm. Simple appearing cyst measures up to 1.7 cm, within physiologic limits of normal for size. No significant free fluid.                                             IMPRESSION:     Heterogeneous, vascularized lesion within the endometrial cavity, measuring up to 1.8 cm. Primary differential consideration would include an endometrial polyp, though submucosal fibroid or endometrial carcinoma cannot be excluded. Sonohysterography   follow-up is recommended.    Medications:   Amlodipine  Atorvastatin  Ethinylestradiol norethisterone acetate  Glimepiride  Medroxyprogesterone  Metformin    Past Medical History:    Hyperlipidemia  Butler's metatarsalgia, neuralgia, or neuroma  Abnormality of gait  Other peripheral enthesopathies  Fatty liver  Pyelonephritis  Type 2 diabetes mellitus  Dysplasia of cervix, low grade  Abnormal uterine bleeding  Anemia due to blood loss, acute  Group B streptococcus infection  Gestational diabetes  H/O blood transfusion  Hypertension    Past Surgical History:    Biopsy, breast, benign  Tonsillectomy  Excise foot neuroma     Physical Exam     Patient Vitals for the past 24 hrs:   BP Temp Temp src Pulse Resp SpO2   05/28/23 1521 -- 98  F (36.7  C) Oral -- 20 99 %   05/28/23 1519 -- -- -- -- -- 99 %   05/28/23 1512 -- -- -- -- -- 99 %   05/28/23 1511 (!) 158/91 -- -- 104 -- 98 %        Physical Exam  Constitutional: Middle aged  female resting supine on the bed, no  respiratory distress.  HENT: No signs of trauma.   Eyes: EOM are normal. Pupils are equal, round, and reactive to light.   Neck: Normal range of motion. No JVD present. No cervical adenopathy.  Cardiovascular: Regular rhythm.  Exam reveals no gallop and no friction rub.    No murmur heard.  Pulmonary/Chest: Bilateral breath sounds normal. No wheezes, rhonchi or rales.  Abdominal: Soft. No tenderness. No rebound or guarding.   Pelvic Exam:   Vulva: Small amount of blood present.  Vagina: Small amount of blood.  Cervix: Minimal bleeding. No motion tenderness and no uterine or adnexal tenderness.  Musculoskeletal: No edema. No tenderness.   Lymphadenopathy: No lymphadenopathy.   Neurological: Alert and oriented to person, place, and time. Normal strength. Coordination normal.   Skin: Skin is warm and dry. No rash noted. No erythema.     Emergency Department Course     Laboratory:  Labs Ordered and Resulted from Time of ED Arrival to Time of ED Departure   BASIC METABOLIC PANEL - Abnormal       Result Value    Sodium 136      Potassium 3.6      Chloride 97 (*)     Carbon Dioxide (CO2) 23      Anion Gap 16 (*)     Urea Nitrogen 6.0      Creatinine 0.40 (*)     Calcium 9.2      Glucose 246 (*)     GFR Estimate >90     CBC WITH PLATELETS AND DIFFERENTIAL - Abnormal    WBC Count 6.9      RBC Count 3.31 (*)     Hemoglobin 10.0 (*)     Hematocrit 30.5 (*)     MCV 92      MCH 30.2      MCHC 32.8      RDW 14.1      Platelet Count 500 (*)     % Neutrophils 59      % Lymphocytes 30      % Monocytes 8      % Eosinophils 2      % Basophils 1      % Immature Granulocytes 0      NRBCs per 100 WBC 0      Absolute Neutrophils 4.1      Absolute Lymphocytes 2.0      Absolute Monocytes 0.6      Absolute Eosinophils 0.2      Absolute Basophils 0.1      Absolute Immature Granulocytes 0.0      Absolute NRBCs 0.0     TYPE AND SCREEN, ADULT    ABO/RH(D) O POS      Antibody Screen Negative      SPECIMEN EXPIRATION DATE [id number]     ABO/RH  TYPE AND SCREEN      Emergency Department Course & Assessments:    Interventions:  None    Assessments:  1532 I obtained history and examined the patient.    Independent Interpretation (X-rays, CTs, rhythm strip):  None    Consultations/Discussion of Management or Tests:  None     Social Determinants of Health affecting care:   None    Disposition:  The patient was discharged to home.     Impression & Plan      Medical Decision Making:  Theresa Quarles is a 53 year old female who presents with vaginal bleeding. Patient was just admitted to the [facility] a few days ago. Her hemoglobin dropped had dropped to 5.5 and she received two units of blood. Ultrasound showed an irregular bleeding area in the endometrial cavity consistent with probable uterine polyp. Patient is scheduled to have surgery for this in the coming week but again began having significant bleeding. She called her clinic and was told to come in and be seen. On my exam her abdomen is soft and non-tender. On pelvic exam there was a small amount of blood from the cervical os but no significant hemorrhage. Her hemoglobin is stable at 10. I have spoken to gynecology on-call, Misti Chavira, who recommended I start her on Provera 10 mg daily for the next 10 days which should allow her to get to her surgery. She should follow up with Dr. Arriaza, her gynecologist, in two days. If she has worsening symptoms, persistent heavy bleeding with weakness, fevers, or chills, recheck in the ED.    Diagnosis:    ICD-10-CM    1. Abnormal vaginal bleeding  N93.9            Discharge Medications:  Discharge Medication List as of 5/28/2023  4:25 PM      START taking these medications    Details   medroxyPROGESTERone (PROVERA) 10 MG tablet Take 1 tablet (10 mg) by mouth daily, Disp-10 tablet, R-0, Local Print              Scribe Disclosure:  Millie SOLIMAN, B.S. am serving as a scribe at 3:38 PM on 5/28/2023 to document services personally performed by Ezekiel Gutiérrez,  MD based on my observations and the provider's statements to me.        Ezekiel Gutiérrez MD  05/28/23 1944

## 2023-05-28 NOTE — ED NOTES
Bed: ED25  Expected date:   Expected time:   Means of arrival:   Comments:  Triage when clean - Quarles

## 2023-05-28 NOTE — ED TRIAGE NOTES
Pt had vaginal bleeding since may 12th   Pt was seen at [facility] and had a blood transfusion  Pt MD told her to come in today after she noticed some vaginal bleeding -bright red

## 2023-05-28 NOTE — TELEPHONE ENCOUNTER
Triage Call:    Caller: Patient     Pt with recent hospitalization for abnormal uterine bleeding calling in today after bleeding has picked up since this morning.  Since hospitalization Pt has been spotting. But now having clotting with quarter size and larger clots. Released IP 5/19/23      Perimenopausal. Anemic and transfused r/t this at that time.      Schedule for fractional D&C w/ Arsalan on 5/30/23.    Soaking a pad every 1.5 hours with significant clots expelling.    NO abdominal pain.      S/sx lightheaded, dizziness and tiredness     Will go to [facility] ED for eval and treat today .    Protocol Recommended Disposition: See HCP with 4 hours - Deferred to ED d/t situation     Caller verbalized understanding of instructions and questions answered.      Florence Salas RN, MN, PHN on 5/28/2023 at 1:49 PM  [facility]  RN utilized sound nursing judgement based on facility triage protocols during this encounter.         Reason for Disposition    MODERATE vaginal bleeding (e.g., soaking 1 pad or tampon per hour and present > 6 hours; 1 menstrual cup every 6 hours)    Additional Information    Negative: Difficult to awaken or acting confused (e.g., disoriented, slurred speech)    Negative: SEVERE abdominal pain    Negative: SEVERE vaginal bleeding (e.g., soaking 2 pads or tampons per hour and present 2 or more hours; 1 menstrual cup every 2 hours)    Negative: Patient sounds very sick or weak to the triager    Negative: [1] Vaginal discharge is main symptom AND [2] small amount of blood    Negative: Postpartum (from 0 to 6 weeks after delivery)    Negative: Pregnant < 20 weeks  (less than 5 months)    Negative: Pregnant 20 or more weeks    Negative: Followed a genital area injury (e.g., vagina, vulva)    Negative: Passed out (i.e., lost consciousness, collapsed and was not responding)    Negative: Sounds like a life-threatening emergency to the triager    Commented on: SEVERE dizziness (e.g.,  "unable to stand, requires support to walk, feels like passing out now)     Dizzy and headache but not severe enough to not be able    Answer Assessment - Initial Assessment Questions  1. AMOUNT: \"Describe the bleeding that you are having.\"     - SPOTTING: spotting, or pinkish / brownish mucous discharge; does not fill panty liner or pad     - MILD:  less than 1 pad / hour; less than patient's usual menstrual bleeding    - MODERATE: 1-2 pads / hour; 1 menstrual cup every 6 hours; small-medium blood clots (e.g., pea, grape, small coin)    - SEVERE: soaking 2 or more pads/hour for 2 or more hours; 1 menstrual cup every 2 hours; bleeding not contained by pads or continuous red blood from vagina; large blood clots (e.g., golf ball, large coin)         Change pad 1.5 hours - moderate level     2. ONSET: \"When did the bleeding begin?\" \"Is it continuing now?\"      Heavier bleeding today  Was spotting from rececent IP issue r/t this prior but bleeding has picked up     3. MENSTRUAL PERIOD: \"When was the last normal menstrual period?\" \"How is this different than your period?\"      every month every 27 days -     4. REGULARITY: \"How regular are your periods?\"       Regular but now closer together     5. ABDOMINAL PAIN: \"Do you have any pain?\" \"How bad is the pain?\"  (e.g., Scale 1-10; mild, moderate, or severe)    - MILD (1-3): doesn't interfere with normal activities, abdomen soft and not tender to touch     - MODERATE (4-7): interferes with normal activities or awakens from sleep, abdomen tender to touch     - SEVERE (8-10): excruciating pain, doubled over, unable to do any normal activities       0  6. PREGNANCY: \"Could you be pregnant?\" \"Are you sexually active?\" \"Did you recently give birth?\"      No   7. BREASTFEEDING: \"Are you breastfeeding?\"      No     8. HORMONES: \"Are you taking any hormone medications, prescription or OTC?\" (e.g., birth control pills, estrogen)      No. Just on diabetes medication     9. BLOOD " "THINNERS: \"Do you take any blood thinners?\" (e.g., Coumadin/warfarin, Pradaxa/dabigatran, aspirin)      No     10. CAUSE: \"What do you think is causing the bleeding?\" (e.g., recent gyn surgery, recent gyn procedure; known bleeding disorder, cervical cancer, polycystic ovarian disease, fibroids)          Recently IP for abnormal bleeding     11. HEMODYNAMIC STATUS: \"Are you weak or feeling lightheaded?\" If Yes, ask: \"Can you stand and walk normally?\"         Weak and lightheaded     12. OTHER SYMPTOMS: \"What other symptoms are you having with the bleeding?\" (e.g., passed tissue, vaginal discharge, fever, menstrual-type cramps)        Large quarter size clots    Protocols used: VAGINAL BLEEDING - [id number]      "

## 2023-05-30 ENCOUNTER — TELEPHONE (OUTPATIENT)
Dept: OBGYN | Facility: CLINIC | Age: 53
End: 2023-05-30

## 2023-05-30 ENCOUNTER — ANESTHESIA (OUTPATIENT)
Dept: SURGERY | Facility: CLINIC | Age: 53
End: 2023-05-30
Payer: COMMERCIAL

## 2023-05-30 ENCOUNTER — HOSPITAL ENCOUNTER (OUTPATIENT)
Facility: CLINIC | Age: 53
Discharge: HOME | End: 2023-05-30
Attending: OBSTETRICS & GYNECOLOGY | Admitting: OBSTETRICS & GYNECOLOGY
Payer: COMMERCIAL

## 2023-05-30 ENCOUNTER — ANESTHESIA EVENT (OUTPATIENT)
Dept: SURGERY | Facility: CLINIC | Age: 53
End: 2023-05-30
Payer: COMMERCIAL

## 2023-05-30 VITALS
WEIGHT: 126.1 LBS | TEMPERATURE: 98.4 F | HEIGHT: 63 IN | RESPIRATION RATE: 16 BRPM | DIASTOLIC BLOOD PRESSURE: 88 MMHG | BODY MASS INDEX: 22.34 KG/M2 | SYSTOLIC BLOOD PRESSURE: 147 MMHG | OXYGEN SATURATION: 99 % | HEART RATE: 92 BPM

## 2023-05-30 DIAGNOSIS — Z98.890 POST-OPERATIVE STATE: Primary | ICD-10-CM

## 2023-05-30 LAB
GLUCOSE BLDC GLUCOMTR-MCNC: 103 MG/DL (ref 70–99)
GLUCOSE BLDC GLUCOMTR-MCNC: 120 MG/DL (ref 70–99)
HCG UR QL: NEGATIVE

## 2023-05-30 PROCEDURE — 250N000013 HC RX MED GY IP 250 OP 250 PS 637: Performed by: OBSTETRICS & GYNECOLOGY

## 2023-05-30 PROCEDURE — 250N000011 HC RX IP 250 OP 636: Performed by: NURSE ANESTHETIST, CERTIFIED REGISTERED

## 2023-05-30 PROCEDURE — 81025 URINE PREGNANCY TEST: CPT | Performed by: OBSTETRICS & GYNECOLOGY

## 2023-05-30 PROCEDURE — 710N000012 HC RECOVERY PHASE 2, PER MINUTE: Performed by: OBSTETRICS & GYNECOLOGY

## 2023-05-30 PROCEDURE — 710N000009 HC RECOVERY PHASE 1, LEVEL 1, PER MIN: Performed by: OBSTETRICS & GYNECOLOGY

## 2023-05-30 PROCEDURE — 258N000003 HC RX IP 258 OP 636: Performed by: NURSE ANESTHETIST, CERTIFIED REGISTERED

## 2023-05-30 PROCEDURE — 82962 GLUCOSE BLOOD TEST: CPT

## 2023-05-30 PROCEDURE — 272N000001 HC OR GENERAL SUPPLY STERILE: Performed by: OBSTETRICS & GYNECOLOGY

## 2023-05-30 PROCEDURE — 88305 TISSUE EXAM BY PATHOLOGIST: CPT | Mod: 26 | Performed by: PATHOLOGY

## 2023-05-30 PROCEDURE — 250N000011 HC RX IP 250 OP 636: Performed by: OBSTETRICS & GYNECOLOGY

## 2023-05-30 PROCEDURE — 88305 TISSUE EXAM BY PATHOLOGIST: CPT | Mod: TC | Performed by: OBSTETRICS & GYNECOLOGY

## 2023-05-30 PROCEDURE — 250N000009 HC RX 250: Performed by: NURSE ANESTHETIST, CERTIFIED REGISTERED

## 2023-05-30 PROCEDURE — 370N000017 HC ANESTHESIA TECHNICAL FEE, PER MIN: Performed by: OBSTETRICS & GYNECOLOGY

## 2023-05-30 PROCEDURE — 58558 HYSTEROSCOPY BIOPSY: CPT | Performed by: OBSTETRICS & GYNECOLOGY

## 2023-05-30 PROCEDURE — 250N000025 HC SEVOFLURANE, PER MIN: Performed by: OBSTETRICS & GYNECOLOGY

## 2023-05-30 PROCEDURE — 360N000076 HC SURGERY LEVEL 3, PER MIN: Performed by: OBSTETRICS & GYNECOLOGY

## 2023-05-30 PROCEDURE — 258N000001 HC RX 258: Performed by: OBSTETRICS & GYNECOLOGY

## 2023-05-30 PROCEDURE — 999N000141 HC STATISTIC PRE-PROCEDURE NURSING ASSESSMENT: Performed by: OBSTETRICS & GYNECOLOGY

## 2023-05-30 RX ORDER — PROPOFOL 10 MG/ML
INJECTION, EMULSION INTRAVENOUS PRN
Status: DISCONTINUED | OUTPATIENT
Start: 2023-05-30 | End: 2023-05-30

## 2023-05-30 RX ORDER — FENTANYL CITRATE 50 UG/ML
25 INJECTION, SOLUTION INTRAMUSCULAR; INTRAVENOUS EVERY 5 MIN PRN
Status: DISCONTINUED | OUTPATIENT
Start: 2023-05-30 | End: 2023-05-30 | Stop reason: HOSPADM

## 2023-05-30 RX ORDER — SODIUM CHLORIDE, SODIUM LACTATE, POTASSIUM CHLORIDE, CALCIUM CHLORIDE 600; 310; 30; 20 MG/100ML; MG/100ML; MG/100ML; MG/100ML
INJECTION, SOLUTION INTRAVENOUS CONTINUOUS PRN
Status: DISCONTINUED | OUTPATIENT
Start: 2023-05-30 | End: 2023-05-30

## 2023-05-30 RX ORDER — ACETAMINOPHEN 325 MG/1
975 TABLET ORAL ONCE
Status: DISCONTINUED | OUTPATIENT
Start: 2023-05-30 | End: 2023-05-30 | Stop reason: HOSPADM

## 2023-05-30 RX ORDER — HYDROMORPHONE HCL IN WATER/PF 6 MG/30 ML
0.4 PATIENT CONTROLLED ANALGESIA SYRINGE INTRAVENOUS EVERY 5 MIN PRN
Status: DISCONTINUED | OUTPATIENT
Start: 2023-05-30 | End: 2023-05-30 | Stop reason: HOSPADM

## 2023-05-30 RX ORDER — IBUPROFEN 800 MG/1
800 TABLET, FILM COATED ORAL ONCE
Status: DISCONTINUED | OUTPATIENT
Start: 2023-05-30 | End: 2023-05-30 | Stop reason: HOSPADM

## 2023-05-30 RX ORDER — ONDANSETRON 4 MG/1
4 TABLET, ORALLY DISINTEGRATING ORAL EVERY 30 MIN PRN
Status: DISCONTINUED | OUTPATIENT
Start: 2023-05-30 | End: 2023-05-30 | Stop reason: HOSPADM

## 2023-05-30 RX ORDER — KETOROLAC TROMETHAMINE 30 MG/ML
INJECTION, SOLUTION INTRAMUSCULAR; INTRAVENOUS PRN
Status: DISCONTINUED | OUTPATIENT
Start: 2023-05-30 | End: 2023-05-30

## 2023-05-30 RX ORDER — SODIUM CHLORIDE, SODIUM LACTATE, POTASSIUM CHLORIDE, CALCIUM CHLORIDE 600; 310; 30; 20 MG/100ML; MG/100ML; MG/100ML; MG/100ML
INJECTION, SOLUTION INTRAVENOUS CONTINUOUS
Status: DISCONTINUED | OUTPATIENT
Start: 2023-05-30 | End: 2023-05-30 | Stop reason: HOSPADM

## 2023-05-30 RX ORDER — CEFAZOLIN SODIUM/WATER 2 G/20 ML
2 SYRINGE (ML) INTRAVENOUS SEE ADMIN INSTRUCTIONS
Status: DISCONTINUED | OUTPATIENT
Start: 2023-05-30 | End: 2023-05-30 | Stop reason: HOSPADM

## 2023-05-30 RX ORDER — HYDROMORPHONE HCL IN WATER/PF 6 MG/30 ML
0.2 PATIENT CONTROLLED ANALGESIA SYRINGE INTRAVENOUS EVERY 5 MIN PRN
Status: DISCONTINUED | OUTPATIENT
Start: 2023-05-30 | End: 2023-05-30 | Stop reason: HOSPADM

## 2023-05-30 RX ORDER — DEXAMETHASONE SODIUM PHOSPHATE 4 MG/ML
INJECTION, SOLUTION INTRA-ARTICULAR; INTRALESIONAL; INTRAMUSCULAR; INTRAVENOUS; SOFT TISSUE PRN
Status: DISCONTINUED | OUTPATIENT
Start: 2023-05-30 | End: 2023-05-30

## 2023-05-30 RX ORDER — ONDANSETRON 2 MG/ML
4 INJECTION INTRAMUSCULAR; INTRAVENOUS EVERY 30 MIN PRN
Status: DISCONTINUED | OUTPATIENT
Start: 2023-05-30 | End: 2023-05-30 | Stop reason: HOSPADM

## 2023-05-30 RX ORDER — FENTANYL CITRATE 50 UG/ML
INJECTION, SOLUTION INTRAMUSCULAR; INTRAVENOUS PRN
Status: DISCONTINUED | OUTPATIENT
Start: 2023-05-30 | End: 2023-05-30

## 2023-05-30 RX ORDER — OXYCODONE HYDROCHLORIDE 5 MG/1
5 TABLET ORAL
Status: DISCONTINUED | OUTPATIENT
Start: 2023-05-30 | End: 2023-05-30 | Stop reason: HOSPADM

## 2023-05-30 RX ORDER — PROPOFOL 10 MG/ML
INJECTION, EMULSION INTRAVENOUS CONTINUOUS PRN
Status: DISCONTINUED | OUTPATIENT
Start: 2023-05-30 | End: 2023-05-30

## 2023-05-30 RX ORDER — ONDANSETRON 2 MG/ML
INJECTION INTRAMUSCULAR; INTRAVENOUS PRN
Status: DISCONTINUED | OUTPATIENT
Start: 2023-05-30 | End: 2023-05-30

## 2023-05-30 RX ORDER — OXYCODONE HYDROCHLORIDE 5 MG/1
10 TABLET ORAL
Status: DISCONTINUED | OUTPATIENT
Start: 2023-05-30 | End: 2023-05-30 | Stop reason: HOSPADM

## 2023-05-30 RX ORDER — IBUPROFEN 200 MG
400 TABLET ORAL EVERY 4 HOURS PRN
COMMUNITY
Start: 2023-05-30

## 2023-05-30 RX ORDER — FENTANYL CITRATE 50 UG/ML
50 INJECTION, SOLUTION INTRAMUSCULAR; INTRAVENOUS EVERY 5 MIN PRN
Status: DISCONTINUED | OUTPATIENT
Start: 2023-05-30 | End: 2023-05-30 | Stop reason: HOSPADM

## 2023-05-30 RX ORDER — LIDOCAINE 40 MG/G
CREAM TOPICAL
Status: DISCONTINUED | OUTPATIENT
Start: 2023-05-30 | End: 2023-05-30 | Stop reason: HOSPADM

## 2023-05-30 RX ORDER — GLYCOPYRROLATE 0.2 MG/ML
INJECTION, SOLUTION INTRAMUSCULAR; INTRAVENOUS PRN
Status: DISCONTINUED | OUTPATIENT
Start: 2023-05-30 | End: 2023-05-30

## 2023-05-30 RX ORDER — ACETAMINOPHEN 325 MG/1
650 TABLET ORAL EVERY 4 HOURS PRN
Qty: 50 TABLET | Refills: 0 | COMMUNITY
Start: 2023-05-30

## 2023-05-30 RX ORDER — CEFAZOLIN SODIUM/WATER 2 G/20 ML
2 SYRINGE (ML) INTRAVENOUS
Status: COMPLETED | OUTPATIENT
Start: 2023-05-30 | End: 2023-05-30

## 2023-05-30 RX ORDER — LIDOCAINE HYDROCHLORIDE 20 MG/ML
INJECTION, SOLUTION INFILTRATION; PERINEURAL PRN
Status: DISCONTINUED | OUTPATIENT
Start: 2023-05-30 | End: 2023-05-30

## 2023-05-30 RX ORDER — ACETAMINOPHEN 325 MG/1
975 TABLET ORAL ONCE
Status: COMPLETED | OUTPATIENT
Start: 2023-05-30 | End: 2023-05-30

## 2023-05-30 RX ADMIN — KETOROLAC TROMETHAMINE 30 MG: 30 INJECTION, SOLUTION INTRAMUSCULAR at 14:15

## 2023-05-30 RX ADMIN — FENTANYL CITRATE 50 MCG: 50 INJECTION, SOLUTION INTRAMUSCULAR; INTRAVENOUS at 13:43

## 2023-05-30 RX ADMIN — DEXAMETHASONE SODIUM PHOSPHATE 4 MG: 4 INJECTION, SOLUTION INTRA-ARTICULAR; INTRALESIONAL; INTRAMUSCULAR; INTRAVENOUS; SOFT TISSUE at 13:47

## 2023-05-30 RX ADMIN — GLYCOPYRROLATE 0.1 MG: 0.2 INJECTION, SOLUTION INTRAMUSCULAR; INTRAVENOUS at 13:47

## 2023-05-30 RX ADMIN — PROPOFOL 75 MCG/KG/MIN: 10 INJECTION, EMULSION INTRAVENOUS at 13:52

## 2023-05-30 RX ADMIN — SODIUM CHLORIDE, POTASSIUM CHLORIDE, SODIUM LACTATE AND CALCIUM CHLORIDE: 600; 310; 30; 20 INJECTION, SOLUTION INTRAVENOUS at 13:36

## 2023-05-30 RX ADMIN — Medication 2 G: at 13:36

## 2023-05-30 RX ADMIN — PROPOFOL 150 MG: 10 INJECTION, EMULSION INTRAVENOUS at 13:47

## 2023-05-30 RX ADMIN — ONDANSETRON 4 MG: 2 INJECTION INTRAMUSCULAR; INTRAVENOUS at 13:47

## 2023-05-30 RX ADMIN — ACETAMINOPHEN 975 MG: 325 TABLET ORAL at 13:12

## 2023-05-30 RX ADMIN — LIDOCAINE HYDROCHLORIDE 30 MG: 20 INJECTION, SOLUTION INFILTRATION; PERINEURAL at 13:47

## 2023-05-30 RX ADMIN — MIDAZOLAM 1 MG: 1 INJECTION INTRAMUSCULAR; INTRAVENOUS at 13:38

## 2023-05-30 RX ADMIN — FENTANYL CITRATE 50 MCG: 50 INJECTION, SOLUTION INTRAMUSCULAR; INTRAVENOUS at 13:47

## 2023-05-30 ASSESSMENT — ACTIVITIES OF DAILY LIVING (ADL)
ADLS_ACUITY_SCORE: 35
ADLS_ACUITY_SCORE: 35

## 2023-05-30 NOTE — OP NOTE
Procedure Date: 05/30/2023    PREOPERATIVE DIAGNOSES:  Persistent abnormal uterine bleeding and profound anemia associated with chronic blood loss.    POSTOPERATIVE DIAGNOSES:  Persistent abnormal uterine bleeding and profound anemia associated with chronic blood loss plus 3 intrauterine polypoid structures.    TITLE OF PROCEDURE:  Exam under anesthesia, diagnostic hysteroscopy with resection of intracavitary polyp-appearing structures with the MyoSure device, fractional D and C.    SURGEON:  Kraig Arriaza MD.    HISTORY OF PRESENT ILLNESS:  The patient is a 53-year-old, [place]  female, para 2-0-0-2, last menstrual period 05/12/2023, who had been on Junel 1/20.  I was asked to evaluate for menorrhagia.  The patient has been having prolonged heavy menses in the preceding weeks, but they have been worse in the several days prior to my initial visit.  She was utilizing sanitary napkins at least every hour to hour and a half for the preceding 3-4 days.  The patient was reporting syncope but denied any trauma or other significant injury.  The patient was noted to have profound anemia to 5.5 and was transfused.  Ultrasound examination revealed a heterogeneous, vascularized lesion within the endometrial cavity, measuring 1.4 x 1.6 x 1.8 cm, suggestive of a possible polyp.  I reviewed this finding with the patient and her  and discussed with them the risks, benefits and alternative forms of therapy.  We discussed benign versus malignant possibilities.  After this lengthy discussion, the decision was made to proceed with an exam under anesthesia, hysteroscopy with the MyoSure device available and fractional D and C. I think the patient and her  have a good understanding of the nature of the problem, the nature of the procedure, the risks, the benefits, the alternatives.  All of their questions have been answered.  Informed consent has been obtained.  The patient has been advised on several occasions to  stop oral contraceptive pill therapy.    OPERATIVE FINDINGS:  Intraoperative photos were taken and were placed in the patient's chart.    EXAM UNDER ANESTHESIA:  External genitalia:  BUS without lesions.  Speculum:  Multiparous-appearing cervix.  No lesions seen.  Bimanual exam:  The uterus is consistent with approximately 8-week size and is smooth.  Adnexa without masses or enlargement.  Rectovaginal exam reveals no uterosacral ligament nodularity or masses.      DETAILS FROM HYSTEROSCOPY:  Hysteroscopy was performed with normal saline as our distending agent.  The endocervical canal was within normal limits.  The endometrial cavity has an approximately 1 cm, pedunculated, polypoid-appearing structure on the right uterine wall at approximately the level of the tubal ostium on the right side.  The fundus of the uterus was otherwise unremarkable.  The left lateral uterine wall has a pedunculated polyp, measuring approximately 1.5 x 2 cm in dimension.  There is a smaller, approximately 1.5 cm, polypoid-appearing structure on the left lateral uterine wall inferior and distal to the earlier-mentioned fibroid on the left wall.  The lower uterine segment is otherwise unremarkable.  The right and left tubal ostia are seen and unremarkable.  The endocervical canal appears to be within normal limits.    DETAILS OF PROCEDURE:  After obtaining informed consent, the patient was taken to the operating room, where general anesthetic was achieved.  Pneumatic stockings were placed preoperatively.  She was placed in the dorsal lithotomy position in the [facility], with the legs approximately 30 degrees to the horizontal.  An exam under anesthesia was performed, with the above findings.  Following this, the patient was prepped and draped in the usual sterile fashion.  A hard stop was performed.  The patient had just voided prior to entering the operating room.  A bivalve speculum was placed in the vagina.  The cervix was  visualized and grasped with a tenaculum, and endocervical curettings were performed with a Kevorkian curette and the specimen submitted to pathology.  The endometrial cavity sounded to 7 cm.  The endocervical canal was dilated to a #6 Hegar dilator.  Using the 5 mm hysteroscope with saline as our distending medium, the endometrial cavity was explored.  The above findings were noted.  Following this, the MyoSure Reach device was brought into the operative field, and the polypoid structures from the right lateral uterine wall and the left uterine wall were excised with the MyoSure device and submitted to pathology.  There was a small, loose fragment of polypoid structure, which was not easily removed with the MyoSure device.  Following this, the MyoSure device was removed, and endometrial curettings were performed with the endometrial curette.  The endometrial cavity had a smooth, concave feel to it, with grittiness in all quadrants.  There was no evidence of compromise to the endometrial cavity.  The endometrial cavity was then explored with Ezekiel stone forceps, and the small polypoid structure, which was noted earlier, was removed intact and submitted to pathology.  At the completion of the procedure, the instrumentation was removed.  Sponge, needle and instrument counts were checked and were documented to be correct x2.  Hemostasis was checked and found to be acceptable.  With counts correct and hemostasis acceptable, the patient was taken out of dorsal lithotomy position, awoken and returned to the recovery area in good condition.  There were a 440 mL fluid deficit.  The estimated blood loss was approximately 30 mL.  The patient tolerated the procedure well and was returned to the recovery area in good condition.    Kraig Arriaza MD        D: 2023   T: 2023   MT: [email or web address]    Name:     AURA HU  MRN:      [id number]        Account:        [id number]   :      1970           Procedure Date:  05/30/2023     Document: [id number]    cc:  Arsenio Smith MD

## 2023-05-30 NOTE — H&P
Theresa Quarles is a 53 year old [place] and female  Patient's last menstrual period was 2023.  Concern of possible menopause on Junel 1/20 who was seen in the emergency room on 2023 for evaluation of menorrhagia. It was reported that she had been having intermittent heavy menometrorrhagia   in the last several weeks prior but more pronounced and worse in the past several days up to a point that she was utilizing frequent use of sanitary napkins every half an hour at least in the last 3 to 4 days.  There was also report of a syncopal event that occurred the day prior and also on 2023 but no reported significant obvious injury or head trauma.  The patient has a history of hypertension, non-insulin-requiring diabetes mellitus, dyslipidemia, prior abnormal uterine bleeding documented in  but patient has no recollection of any major issues with vaginal bleeding), prior abnormal Pap smear with high risk HPV test positive back in May 2021, previously also underwent colposcopy and cervical biopsy in ,.  Patient was transfused with 2 units of packed cells and presents today for follow-up.  In the emergency room on 2023 the patient had a hemoglobin of 5.5.  Her most recent hemoglobin   on 2023 was 10.0.  She denies vasovagal symptoms at this time and is on multivitamins with iron  EXAM: US PELVIC TRANSABDOMINAL AND TRANSVAGINAL  LOCATION: [facility]  DATE/TIME: 2023 7:14 PM CDT     INDICATION: Abnormal uterine bleeding.  COMPARISON: 2019.  TECHNIQUE: Transabdominal scans were performed. Endovaginal ultrasound was performed to better visualize the adnexa.     FINDINGS:     UTERUS: 9.8 x 5.6 x 4.5 cm. Heterogeneous, vascularized lesion within the endometrial cavity, measuring 1.4 x 1.6 x 1.8 cm.     ENDOMETRIUM: 20 mm. See above.     RIGHT OVARY: 3.2 x 1.8 x 1.3 cm. Normal.     LEFT OVARY: 3.0 x 2.1 x 1.9 cm. Simple appearing cyst measures up to  "1.7 cm, within physiologic limits of normal for size. No significant free fluid.                                             IMPRESSION:     Heterogeneous, vascularized lesion within the endometrial cavity, measuring up to 1.8 cm. Primary differential consideration would include an endometrial polyp, though submucosal fibroid or endometrial carcinoma cannot be excluded.    Past Medical History:   Diagnosis Date     Abnormal Pap smear of cervix 03/31/2022     Cervical high risk HPV (human papillomavirus) test positive 11/23/2018 5/13/21     Fatty liver 05/21/2012     GBS (group B streptococcus) infection 09/04/2008     gestational diabetes      Gestational diabetes mellitus, antepartum 03/09/2009     H/O colposcopy with cervical biopsy 07/08/2021     History of blood transfusion      Hyperlipidemia LDL goal < 130 07/13/2010     Hypertension      Liver function test abnormality 07/13/2010     Pregnancies, high-risk 03/19/2009     Current Facility-Administered Medications   Medication     ceFAZolin Sodium (ANCEF) injection 2 g     ceFAZolin Sodium (ANCEF) injection 2 g     lactated ringers infusion     lidocaine (LMX4) cream     lidocaine 1 % 0.1-1 mL     sodium chloride (PF) 0.9% PF flush 3 mL     sodium chloride (PF) 0.9% PF flush 3 mL     BP (!) 150/93   Pulse 85   Resp 16   Ht 1.6 m (5' 3\")   Wt 57.2 kg (126 lb 1.6 oz)   LMP 05/12/2023   SpO2 96%   BMI 22.34 kg/m    Constitutional: healthy, alert and mild distress  Cardiovascular: negative, PMI normal. No lifts, heaves, or thrills. RRR. No murmurs, clicks gallops or rub  Respiratory: negative, Percussion normal. Good diaphragmatic excursion. Lungs clear  Gastrointestinal: Abdomen soft, non-tender. BS normal. No masses, organomegaly    A:  AUB  Pt has been advised to stop OCP's    P:  Risks, benefits, and alternative modes of therapy discussed at length. Pathophysiology of the disease process reviewed, all of the patients questions answered and informed " consent obtained.  Will proceed with an exam under anesthesia, hysteroscopy with the myosure device, fractional dilation and curettage  Benign vs malignant possibilities discussed   All questions answered and informed consent obtained

## 2023-05-30 NOTE — LETTER
[facility] PREOP/POSTOP  [address]  [facility] [address]  Phone: [phone]  Fax: [phone]    May 30, 2023        Tehresa Quarles  [address] [place] [address]          To whom it may concern:    RE: Theresa Quarles    Underwent a surgical procedure today, Tuesday, May 30, 2023 for an ongoing medical concern that has caused her to miss work since May 18, 2023.  As a result of the surgical procedure I would like to excuse her from work through Sunday, June 6, 2023.  At that time I feel she should be able to return to work without restrictions barring any postop complications.    Please contact me for questions or concerns.      Sincerely,        Kraig LOPEZOG

## 2023-05-30 NOTE — ANESTHESIA POSTPROCEDURE EVALUATION
Patient: Theresa Quarles    Procedure: Procedure(s):  exam under anesthesia, hysteroscopy with the myosure device and polypectomy, fractional dilation and curettage       Anesthesia Type:  General    Note:  Disposition: Outpatient   Postop Pain Control: Uneventful            Sign Out: Well controlled pain   PONV: No   Neuro/Psych: Uneventful            Sign Out: Acceptable/Baseline neuro status   Airway/Respiratory: Uneventful            Sign Out: Acceptable/Baseline resp. status   CV/Hemodynamics: Uneventful            Sign Out: Acceptable CV status; No obvious hypovolemia; No obvious fluid overload   Other NRE: NONE   DID A NON-ROUTINE EVENT OCCUR? No           Last vitals:  Vitals Value Taken Time   /79 05/30/23 1445   Temp 98  F (36.7  C) 05/30/23 1445   Pulse 84 05/30/23 1455   Resp 19 05/30/23 1455   SpO2 97 % 05/30/23 1455   Vitals shown include unvalidated device data.    Electronically Signed By: Cullen Triana MD  May 30, 2023  2:56 PM

## 2023-05-30 NOTE — PROGRESS NOTES
Preop Diagnosis: persistent abnormal uterine bleeding with profound anemia    Post-Op Diagnosis: same, 3 intrauterine polypoid structures    Procedure: Exam under anesthesia, diagnostic hysteroscopy with resection of intracavitary polypoid structures with the myosure device and fractional D & C    Surgeon: JONATHAN ROBERTS MD,     EBL: 30 cc    Anesthesia: General    Path: ECC, EMC, myosure currettings    Complications: no problems reported    Findings: see dictated operative note for full details  See intraoperative photos in pt chart        JONATHAN ROBERTS MD  2:25 PM  5/30/2023

## 2023-05-30 NOTE — ANESTHESIA PREPROCEDURE EVALUATION
Anesthesia Pre-Procedure Evaluation    Patient: Theresa Quarles   MRN: [id number] : 1970        Procedure : Procedure(s):  exam under anesthesia, hysteroscopy with the myosure device, fractional dilation and curettage          Past Medical History:   Diagnosis Date     Abnormal Pap smear of cervix 2022     Cervical high risk HPV (human papillomavirus) test positive 2018     Fatty liver 2012     GBS (group B streptococcus) infection 2008     gestational diabetes      Gestational diabetes mellitus, antepartum 2009     H/O colposcopy with cervical biopsy 2021     History of blood transfusion      Hyperlipidemia LDL goal < 130 2010     Hypertension      Liver function test abnormality 2010     Pregnancies, high-risk 2009      Past Surgical History:   Procedure Laterality Date     BIOPSY BREAST  2012    needle bx benign     HC REMOVAL OF TONSILS,14+ Y/O      Tonsils 14+y.o.     [facility] EXCISE HAND/FOOT NEUROMA        Allergies   Allergen Reactions     Lisinopril Hives     Noted angioedema      Social History     Tobacco Use     Smoking status: Never     Smokeless tobacco: Never   Vaping Use     Vaping status: Never Used   Substance Use Topics     Alcohol use: No      Wt Readings from Last 1 Encounters:   23 57.2 kg (126 lb 1.6 oz)        Anesthesia Evaluation   Pt has had prior anesthetic.     No history of anesthetic complications       ROS/MED HX  ENT/Pulmonary:  - neg pulmonary ROS     Neurologic:  - neg neurologic ROS     Cardiovascular:     (+) Dyslipidemia hypertension-----    METS/Exercise Tolerance:     Hematologic:  - neg hematologic  ROS     Musculoskeletal:  - neg musculoskeletal ROS     GI/Hepatic:     (+) liver disease,     Renal/Genitourinary:  - neg Renal ROS     Endo:  - neg endo ROS     Psychiatric/Substance Use:  - neg psychiatric ROS     Infectious Disease:  - neg infectious disease ROS     Malignancy:       Other:             Physical Exam    Airway        Mallampati: II   TM distance: > 3 FB   Neck ROM: full   Mouth opening: > 3 cm    Respiratory Devices and Support         Dental       (+) Minor Abnormalities - some fillings, tiny chips      Cardiovascular   cardiovascular exam normal          Pulmonary   pulmonary exam normal                OUTSIDE LABS:  CBC:   Lab Results   Component Value Date    WBC 6.9 05/28/2023    WBC 6.7 05/19/2023    HGB 10.0 (L) 05/28/2023    HGB 8.9 (L) 05/19/2023    HCT 30.5 (L) 05/28/2023    HCT 26.1 (L) 05/19/2023     (H) 05/28/2023     05/19/2023     BMP:   Lab Results   Component Value Date     05/28/2023     (L) 05/18/2023    POTASSIUM 3.6 05/28/2023    POTASSIUM 3.6 05/19/2023    CHLORIDE 97 (L) 05/28/2023    CHLORIDE 99 05/18/2023    CO2 23 05/28/2023    CO2 24 05/18/2023    BUN 6.0 05/28/2023    BUN 7.6 05/18/2023    CR 0.40 (L) 05/28/2023    CR 0.43 (L) 05/18/2023     (H) 05/30/2023     (H) 05/28/2023     COAGS:   Lab Results   Component Value Date    PTT 34 04/27/2015    INR 1.04 05/18/2023     POC:   Lab Results   Component Value Date     (H) 07/19/2015    HCG Negative 05/30/2023     HEPATIC:   Lab Results   Component Value Date    ALBUMIN 3.6 06/03/2022    PROTTOTAL 7.8 06/03/2022    ALT 26 06/03/2022    AST 24 06/03/2022    ALKPHOS 52 06/03/2022    BILITOTAL 0.6 06/03/2022     OTHER:   Lab Results   Component Value Date    A1C 7.7 (H) 11/22/2022    VINITA 9.2 05/28/2023    MAG 1.6 (L) 05/19/2023    LIPASE 116 02/25/2020    AMYLASE 106 02/25/2020    TSH 3.88 06/14/2021    T4 1.01 11/04/2019       Anesthesia Plan    ASA Status:  2      Anesthesia Type: General.     - Airway: LMA   Induction: Intravenous.   Maintenance: Balanced.        Consents    Anesthesia Plan(s) and associated risks, benefits, and realistic alternatives discussed. Questions answered and patient/representative(s) expressed understanding.    - Discussed:     - Discussed with:   Patient      - Extended Intubation/Ventilatory Support Discussed: No.      - Patient is DNR/DNI Status: No    Use of blood products discussed: No .     Postoperative Care    Pain management: Oral pain medications, IV analgesics, Multi-modal analgesia.   PONV prophylaxis: Ondansetron (or other 5HT-3), Dexamethasone or Solumedrol     Comments:                Cullen Triana MD

## 2023-05-30 NOTE — ANESTHESIA CARE TRANSFER NOTE
Patient: Theresa Quarles    Procedure: Procedure(s):  exam under anesthesia, hysteroscopy with the myosure device and polypectomy, fractional dilation and curettage       Diagnosis: Abnormal uterine bleeding [N93.9]  Anemia [D64.9]  Diagnosis Additional Information: No value filed.    Anesthesia Type:   General     Note:    Oropharynx: oropharynx clear of all foreign objects and spontaneously breathing  Level of Consciousness: drowsy  Oxygen Supplementation: face mask    Independent Airway: airway patency satisfactory and stable  Dentition: dentition unchanged  Vital Signs Stable: post-procedure vital signs reviewed and stable  Report to RN Given: handoff report given  Patient transferred to: PACU    Handoff Report: Identifed the Patient, Identified the Reponsible Provider, Reviewed the pertinent medical history, Discussed the surgical course, Reviewed Intra-OP anesthesia mangement and issues during anesthesia, Set expectations for post-procedure period and Allowed opportunity for questions and acknowledgement of understanding      Vitals:  Vitals Value Taken Time   /79 05/30/23 1427   Temp     Pulse 73 05/30/23 1429   Resp 14 05/30/23 1429   SpO2 100 % 05/30/23 1429   Vitals shown include unvalidated device data.    Electronically Signed By: IFEOMA Deutsch CRNA  May 30, 2023  2:30 PM

## 2023-05-30 NOTE — TELEPHONE ENCOUNTER
Form received from: Pedro      Form requesting following info/need: Medical      YUNIOR needed?: no      Location of form: Above [facility] desk      When completed the route for return: Fax to   [phone]

## 2023-05-30 NOTE — ANESTHESIA PROCEDURE NOTES
Airway       Patient location during procedure: OR       Procedure Start/Stop Times: 5/30/2023 1:48 PM  Staff -        CRNA: Angelina Quinones APRN CRNA       Performed By: CRNA  Consent for Airway        Urgency: elective  Indications and Patient Condition       Indications for airway management: shant-procedural       Induction type:intravenous       Mask difficulty assessment: 1 - vent by mask    Final Airway Details       Final airway type: supraglottic airway    Supraglottic Airway Details        Type: LMA       Brand: I-Gel       LMA size: 4    Post intubation assessment        Placement verified by: capnometry        Number of attempts at approach: 1       Secured with: plastic tape       Ease of procedure: easy       Dentition: Intact and Unchanged    Medication(s) Administered   Medication Administration Time: 5/30/2023 1:48 PM

## 2023-05-30 NOTE — DISCHARGE INSTRUCTIONS
GENERAL ANESTHESIA OR SEDATION ADULT DISCHARGE INSTRUCTIONS   SPECIAL PRECAUTIONS FOR 24 HOURS AFTER SURGERY    IT IS NOT UNUSUAL TO FEEL LIGHT-HEADED OR FAINT, UP TO 24 HOURS AFTER SURGERY OR WHILE TAKING PAIN MEDICATION.  IF YOU HAVE THESE SYMPTOMS; SIT FOR A FEW MINUTES BEFORE STANDING AND HAVE SOMEONE ASSIST YOU WHEN YOU GET UP TO WALK OR USE THE BATHROOM.    YOU SHOULD REST AND RELAX FOR THE NEXT 24 HOURS AND YOU MUST MAKE ARRANGEMENTS TO HAVE SOMEONE STAY WITH YOU FOR AT LEAST 24 HOURS AFTER YOUR DISCHARGE.  AVOID HAZARDOUS AND STRENUOUS ACTIVITIES.  DO NOT MAKE IMPORTANT DECISIONS FOR 24 HOURS.    DO NOT DRIVE ANY VEHICLE OR OPERATE MECHANICAL EQUIPMENT FOR 24 HOURS FOLLOWING THE END OF YOUR SURGERY.  EVEN THOUGH YOU MAY FEEL NORMAL, YOUR REACTIONS MAY BE AFFECTED BY THE MEDICATION YOU HAVE RECEIVED.    DO NOT DRINK ALCOHOLIC BEVERAGES FOR 24 HOURS FOLLOWING YOUR SURGERY.    DRINK CLEAR LIQUIDS (APPLE JUICE, GINGER ALE, 7-UP, BROTH, ETC.).  PROGRESS TO YOUR REGULAR DIET AS YOU FEEL ABLE.    YOU MAY HAVE A DRY MOUTH, A SORE THROAT, MUSCLES ACHES OR TROUBLE SLEEPING.  THESE SHOULD GO AWAY AFTER 24 HOURS.    CALL YOUR DOCTOR FOR ANY OF THE FOLLOWING:  SIGNS OF INFECTION (FEVER, GROWING TENDERNESS AT THE SURGERY SITE, A LARGE AMOUNT OF DRAINAGE OR BLEEDING, SEVERE PAIN, FOUL-SMELLING DRAINAGE, REDNESS OR SWELLING.    IT HAS BEEN OVER 8 TO 10 HOURS SINCE SURGERY AND YOU ARE STILL NOT ABLE TO URINATE (PASS WATER).       You received Toradol, an IV form of Ibuprofen (Motrin) at 2:15pm.  Do not take any Ibuprofen products until 8:15pm.    Maximum acetaminophen (Tylenol) dose from all sources should not exceed 4 grams (4000 mg) per day.  You received 975mg at 1:15pm today.  Do not take anymore tylenol until 7:15pm if needed.       DR. JONATHAN ROBERTS M.D.   CLINIC PHONE NUMBER:  [phone].  [facility]

## 2023-05-30 NOTE — TELEPHONE ENCOUNTER
Form signed. Form faxed successfully to [phone].    Original form sent to scan into pt chart. Copy of form placed in Dr. Arriaza's basket.    Lynette Lai CMA

## 2023-05-30 NOTE — PROGRESS NOTES
DISCHARGE SUMMARY      Patient ID:  Marcus Moncada  [id number]  82 y.o.  6/7/1931    Admit date: 6/6/2020    Discharge date and time: No discharge date for patient encounter. Admitting Physician: Joey Subramanian MD     Discharge Physician: Shraa Adkins MD    Admission Diagnoses: SBO (small bowel obstruction) ([facility] [K56.609]    Procedures: * No surgery found *    Discharge Diagnoses: Active Problems:    [facility] (6/6/2020)        Consults: none    Significant Diagnostic Studies: radiology: CT scan: abdomen    Hospital Course:  Pt admitted with CT c/w pSBO.  Resolved with bowel rest and hydration.  On discharge pt is without pain, ambulating, and tolerating  diet. D/C Disposition: home     Patient Instructions:   Current Discharge Medication List      CONTINUE these medications which have NOT CHANGED    Details   POTASSIUM PO Take 1 Tab by mouth daily. aspirin 81 mg chewable tablet Take 1 Tab by mouth daily. Qty: 30 Tab, Refills: 0      simvastatin (ZOCOR) 20 mg tablet Take 20 mg by mouth nightly. 1/2 tab nightly        hydrochlorothiazide (MICROZIDE) 12.5 mg capsule Take 12.5 mg by mouth daily. tAKE ONLY WHEN ELEVATED BP      DOCOSAHEXANOIC ACID/EPA (FISH OIL PO) Take  by mouth. MULTIVITAMINS (VITAMIN DAILY PO) Take  by mouth daily. FAMOTIDINE (PEPCID PO) Take  by mouth as needed.              Diet: resume pre-hospital diet    Follow-up with PCP as needed       Signed:  Patricia Becerra MD  6/10/2020  8:53 AM The patient is requesting a note for her work.  I gave her a leave of absence through Sunday, June 6, 2023 after which she may return to work without restrictions barring any postop complications

## 2023-05-31 ENCOUNTER — TELEPHONE (OUTPATIENT)
Dept: OBGYN | Facility: CLINIC | Age: 53
End: 2023-05-31
Payer: COMMERCIAL

## 2023-05-31 LAB
PATH REPORT.COMMENTS IMP SPEC: NORMAL
PATH REPORT.COMMENTS IMP SPEC: NORMAL
PATH REPORT.FINAL DX SPEC: NORMAL
PATH REPORT.GROSS SPEC: NORMAL
PATH REPORT.MICROSCOPIC SPEC OTHER STN: NORMAL
PATH REPORT.RELEVANT HX SPEC: NORMAL
PHOTO IMAGE: NORMAL

## 2023-05-31 NOTE — TELEPHONE ENCOUNTER
Forms/Letter Request    Type of form/letter: Work   Pt requesting letter for work for missing work from 5/15/ to 6/5 due to recent surgery with Dr. Arriaza and hospital visits. Please call pt back if possible to provide this letter.    Have you been seen for this request: N/A    Do we have the form/letter: No      When is form/letter needed by: as soon as possible    How would you like the form/letter returned: [employer]    Patient Notified form requests are processed in 3-5 business days:No    Could we send this information to you in [employer] or would you prefer to receive a phone call?:   Patient would prefer a phone call   Okay to leave a detailed message?: Yes at Cell number on file:    Telephone Information:   Mobile [phone]

## 2023-05-31 NOTE — TELEPHONE ENCOUNTER
Spoke with the pt.   Letter that was written on 5/30/23 was sent to the pt via my chart per pts request.      Maria Esther RODRIGUEZ RN

## 2023-05-31 NOTE — LETTER
May 31, 2023      Theresa Quarles  [address] [place] [id number]          To whom it may concern:     RE: Theresa Quarles     Underwent a surgical procedure today, Tuesday, May 30, 2023 for an ongoing medical concern that has caused her to miss work since May 18, 2023.  As a result of the surgical procedure I would like to excuse her from work through Sunday, June 6, 2023.  At that time I feel she should be able to return to work without restrictions barring any postop complications.     Please contact me for questions or concerns.        Sincerely,           Kraig LOPEZOG

## 2023-06-01 ENCOUNTER — TELEPHONE (OUTPATIENT)
Dept: OBGYN | Facility: CLINIC | Age: 53
End: 2023-06-01
Payer: COMMERCIAL

## 2023-06-01 NOTE — TELEPHONE ENCOUNTER
I will further review this pathology report and develop a treatment plan when I see the patient for her postop visit on June 9, 2023

## 2023-06-02 ENCOUNTER — HEALTH MAINTENANCE LETTER (OUTPATIENT)
Age: 53
End: 2023-06-02

## 2023-06-06 ENCOUNTER — TELEPHONE (OUTPATIENT)
Dept: INTERNAL MEDICINE | Facility: CLINIC | Age: 53
End: 2023-06-06
Payer: COMMERCIAL

## 2023-06-06 NOTE — LETTER
June 6, 2023      Theresa Quarles  [address] [place] [phone]        To Whom It May Concern:    Theresa Quarles is a patient under my care. She was going to originally return to work this week but we would like to extend her leave until after our follow up appointment this Friday June 9, 2023. At that time we will re-evaluate and give a new update on her return to work date.      Sincerely,        Kraig Arriaza MD

## 2023-06-06 NOTE — TELEPHONE ENCOUNTER
TO PCP    Patient had surgery last week and received a note to return to work tomorrow but patient states she still feels dizzy and her employment is 55 miles from her home. She is requesting additional time to recover and note for insurance.     Please call patient with questions. Ok to leave a detailed message.     Mary Daniels RN on 6/6/2023 at 9:10 AM

## 2023-06-06 NOTE — TELEPHONE ENCOUNTER
Pt calls clinic. Post op 5/30 hysteroscope, myosure    She states she has been having some dizziness. She states this can sometimes be surrounding meal times and when walking. She is not checking BG at home. She has meter at home but forgot how to use it.    She has f/u appt this Friday June 9.   Vaginal bleeding is just spotting at this point and has not been heavy.    Do you want any labs performed prior to her appt on Friday? CBC, A1C, BMP?    I did extend her leave from work with a letter until you guys meet on Friday.      Katia PATTON RN BSN

## 2023-06-09 ENCOUNTER — OFFICE VISIT (OUTPATIENT)
Dept: OBGYN | Facility: CLINIC | Age: 53
End: 2023-06-09
Payer: COMMERCIAL

## 2023-06-09 ENCOUNTER — TELEPHONE (OUTPATIENT)
Dept: OBGYN | Facility: CLINIC | Age: 53
End: 2023-06-09

## 2023-06-09 VITALS — SYSTOLIC BLOOD PRESSURE: 138 MMHG | DIASTOLIC BLOOD PRESSURE: 78 MMHG | WEIGHT: 126 LBS | BODY MASS INDEX: 22.32 KG/M2

## 2023-06-09 DIAGNOSIS — N93.9 ABNORMAL UTERINE BLEEDING (AUB): Primary | ICD-10-CM

## 2023-06-09 LAB
ERYTHROCYTE [DISTWIDTH] IN BLOOD BY AUTOMATED COUNT: 13.9 % (ref 10–15)
HCT VFR BLD AUTO: 37.5 % (ref 35–47)
HGB BLD-MCNC: 11.8 G/DL (ref 11.7–15.7)
MCH RBC QN AUTO: 29.5 PG (ref 26.5–33)
MCHC RBC AUTO-ENTMCNC: 31.5 G/DL (ref 31.5–36.5)
MCV RBC AUTO: 94 FL (ref 78–100)
PLATELET # BLD AUTO: 440 10E3/UL (ref 150–450)
RBC # BLD AUTO: 4 10E6/UL (ref 3.8–5.2)
WBC # BLD AUTO: 6.5 10E3/UL (ref 4–11)

## 2023-06-09 PROCEDURE — 36415 COLL VENOUS BLD VENIPUNCTURE: CPT | Performed by: OBSTETRICS & GYNECOLOGY

## 2023-06-09 PROCEDURE — 85027 COMPLETE CBC AUTOMATED: CPT | Performed by: OBSTETRICS & GYNECOLOGY

## 2023-06-09 PROCEDURE — 99213 OFFICE O/P EST LOW 20 MIN: CPT | Performed by: OBSTETRICS & GYNECOLOGY

## 2023-06-09 RX ORDER — MEDROXYPROGESTERONE ACETATE 10 MG
10 TABLET ORAL DAILY
Qty: 10 TABLET | Refills: 0 | Status: SHIPPED | OUTPATIENT
Start: 2023-06-09 | End: 2023-06-16

## 2023-06-09 NOTE — PATIENT INSTRUCTIONS
You can reach your [facility] Care Team any time of the day by calling [phone]. This number will put you in touch with the 24 hour nurse line if the clinic is closed.    To contact your OB/GYN Station Coordinator/Surgery Scheduler please call [phone]. This is a direct number for your care team between 8 a.m. and 4 p.m. Monday through Friday.    [facility] is open for your convenience:  Monday through Friday 8 a.m. to 6 p.m.  Closed weekends and all major holidays.

## 2023-06-09 NOTE — NURSING NOTE
"Chief Complaint   Patient presents with     Surgical Followup      D&C  Light bleeding       Initial /78   Wt 57.2 kg (126 lb)   LMP 2023   BMI 22.32 kg/m   Estimated body mass index is 22.32 kg/m  as calculated from the following:    Height as of 23: 1.6 m (5' 3\").    Weight as of this encounter: 57.2 kg (126 lb).  BP completed using cuff size: regular    Questioned patient about current smoking habits.  Pt. has never smoked.          The following HM Due: NONE      The following patient reported/Care Every where data was sent to:  P ABSTRACT QUALITY INITIATIVES [[id number]]        Charlee Cabrera [facility]                 "

## 2023-06-09 NOTE — LETTER
June 9, 2023      Theresa Quarles  [address] [place] [phone]        To Whom It May Concern:    RE: Theresa Quarles     Underwent a surgical procedure, Tuesday, May 30, 2023 for an ongoing medical concern that has caused her to miss work since May 18, 2023.  As a result of the surgical procedure I would like to excuse her from work through Friday June 9.  She may return to work on Monday, June 12, without restrictions.     Please contact me for questions or concerns.    Sincerely,        Kraig Arriaza MD

## 2023-06-09 NOTE — PROGRESS NOTES
The patient is a 53-year-old, [place]  female, para 2-0-0-2, last menstrual period 05/12/2023, who had been on Junel 1/20.  I was asked to evaluate for menorrhagia.  The patient has been having prolonged heavy menses in the preceding weeks, but they have been worse in the several days prior to my initial visit.  She was utilizing sanitary napkins at least every hour to hour and a half for the preceding 3-4 days.  The patient was reporting syncope but denied any trauma or other significant injury.  The patient was noted to have profound anemia to 5.5 and was transfused.  Ultrasound examination revealed a heterogeneous, vascularized lesion within the endometrial cavity, measuring 1.4 x 1.6 x 1.8 cm, suggestive of a possible polyp.  I reviewed this finding with the patient and her  and discussed with them the risks, benefits and alternative forms of therapy.  We discussed benign versus malignant possibilities.  After this lengthy discussion, the decision was made to proceed with an exam under anesthesia, hysteroscopy with the MyoSure device available and fractional D and C.  This was done on 5/30/2023.  Patient presents today for a postop follow-up visit.  I reviewed with the patient the pathology report which showed the following    Final Diagnosis  A. Endocervix, curettage:  --Fragments of endocervical mucosa. Negative for dysplasia or carcinoma.  B. Endometrium, curettage:  --Fragments of benign inactive endometrium and separate large fragment of smooth muscle with features suggestive of  leiomyoma.  C. Endometrium, MyoSure curettage:  --Fragments of benign myometrial smooth muscle with occasional thin attached inactive endometrial mucosa.    The patient is having menstrual type flow today.  She has minimal pain and discomfort.  She has had no complications postoperatively.  Bowel and bladder function are working well    Past Medical History:   Diagnosis Date     Abnormal Pap smear of cervix 03/31/2022      Cervical high risk HPV (human papillomavirus) test positive 11/23/2018 5/13/21     Fatty liver 05/21/2012     GBS (group B streptococcus) infection 09/04/2008     gestational diabetes      Gestational diabetes mellitus, antepartum 03/09/2009     H/O colposcopy with cervical biopsy 07/08/2021     History of blood transfusion      Hyperlipidemia LDL goal < 130 07/13/2010     Hypertension      Liver function test abnormality 07/13/2010     Pregnancies, high-risk 03/19/2009     Current Outpatient Medications   Medication     ACE/ARB/ARNI NOT PRESCRIBED (INTENTIONAL)     acetaminophen (TYLENOL) 325 MG tablet     amLODIPine (NORVASC) 5 MG tablet     atorvastatin (LIPITOR) 10 MG tablet     blood glucose monitoring (ACCU-CHEK BECKY PLUS) meter device kit     carboxymethylcellulose PF (REFRESH PLUS) 0.5 % ophthalmic solution     CINNAMON PO     ferrous sulfate (SLO-FE) 142 (45 Fe) MG CR tablet     glimepiride (AMARYL) 2 MG tablet     ibuprofen (ADVIL/MOTRIN) 200 MG tablet     medroxyPROGESTERone (PROVERA) 10 MG tablet     metFORMIN (GLUCOPHAGE) 500 MG tablet     MULTIVITAMINS PO TABS     No current facility-administered medications for this visit.     /78   Wt 57.2 kg (126 lb)   LMP 05/12/2023   BMI 22.32 kg/m    Constitutional: healthy, alert and no distress  Cardiovascular: negative, PMI normal. No lifts, heaves, or thrills. RRR. No murmurs, clicks gallops or rub  Respiratory: negative, Percussion normal. Good diaphragmatic excursion. Lungs clear  Gastrointestinal: Abdomen soft, non-tender. BS normal. No masses, organomegaly  Genitourinary: Normal external genitalia without lesions and speculum exam multiparous appearing cervix small amount of menstrual type flow seen.  Bimanual exam the uterus is parous mobile adnexa without masses enlargement or tenderness    (N93.9) Abnormal uterine bleeding (AUB)  (primary encounter diagnosis)  Comment: Risks, benefits, and alternative modes of therapy discussed at  length. Pathophysiology of the disease process reviewed, all of the patients questions answered and informed consent obtained.  I gave the patient a detailed written outline of the findings and plan.  The patient had stopped OCPs prior to surgery and some of the withdrawal bleeding she has may be related to that  Plan: medroxyPROGESTERone (PROVERA) 10 MG tablet, CBC        with platelets        I have her use Provera 10 mg daily from the first 10 days of each of the next 3 months.  She is on Provera right now and advised her to finish the course.  We discussed expectations with bleeding post Provera.  I gave her a detailed written outline of the plan.  I like her to keep a bleeding calendar and see her back in 3 months or sooner should concerns arise.  Signs and symptoms of concern were discussed she will call.  I will recheck her hemoglobin today for completeness.  The patient is on a multivitamin with iron and denies vasovagal symptoms

## 2023-06-09 NOTE — TELEPHONE ENCOUNTER
Call from pt stating she saw Dr Arriaza today for post op visit and needs a letter stating she can return to wk on 6/12. Letter created and sent to pt via [employer].    CATALINO Peterson

## 2023-06-13 NOTE — TELEPHONE ENCOUNTER
Please see the office visit note from 6/9/2023.  At that time I recommended a 3-month course of cyclic Provera.  Could you be so kind is to call the patient to inquire how she is feeling and how her bleeding is doing.  If she has any questions I be happy to either discuss it with her or see her in the office   Thank you again for your help     Copied from routing comment from Dr Arriaza

## 2023-06-16 DIAGNOSIS — N93.9 ABNORMAL UTERINE BLEEDING (AUB): ICD-10-CM

## 2023-06-16 RX ORDER — MEDROXYPROGESTERONE ACETATE 10 MG
10 TABLET ORAL DAILY
Qty: 10 TABLET | Refills: 0 | Status: SHIPPED | OUTPATIENT
Start: 2023-06-16 | End: 2023-07-03

## 2023-06-16 NOTE — TELEPHONE ENCOUNTER
medroxyprogesterone      Last Written Prescription Date:  6/9/23  Last Fill Quantity: 10,   # refills: 0  Last Office Visit: 6/9/23  Future Office visit:

## 2023-06-16 NOTE — TELEPHONE ENCOUNTER
"Per LOV note,    \"I will have her use Provera 10 mg daily from the first 10 days of each of the next 3 months.\"    Refilled for July.  Patient instructed to not begin this dose until July 1st.    Myah HOWELL RN    "

## 2023-06-27 DIAGNOSIS — E11.9 TYPE 2 DIABETES MELLITUS WITHOUT COMPLICATION, WITHOUT LONG-TERM CURRENT USE OF INSULIN (H): ICD-10-CM

## 2023-06-27 DIAGNOSIS — I10 BENIGN ESSENTIAL HYPERTENSION: ICD-10-CM

## 2023-06-28 RX ORDER — AMLODIPINE BESYLATE 5 MG/1
TABLET ORAL
Qty: 90 TABLET | Refills: 1 | Status: SHIPPED | OUTPATIENT
Start: 2023-06-28 | End: 2023-11-14

## 2023-07-03 DIAGNOSIS — N93.9 ABNORMAL UTERINE BLEEDING (AUB): ICD-10-CM

## 2023-07-03 RX ORDER — MEDROXYPROGESTERONE ACETATE 10 MG
10 TABLET ORAL DAILY
Qty: 10 TABLET | Refills: 1 | Status: SHIPPED | OUTPATIENT
Start: 2023-07-03 | End: 2023-11-14

## 2023-07-03 NOTE — TELEPHONE ENCOUNTER
medroxyprogesterone    10mg  Last Written Prescription Date:  6/16/23  Last Fill Quantity: 10,   # refills: 0  Last Office Visit: 6/9/23  Future Office visit:

## 2023-07-03 NOTE — TELEPHONE ENCOUNTER
"Per Dr Arriaza's note from OV on 6/9/23, MD wanted pt to do the following:     \"I have her use Provera 10 mg daily from the first 10 days of each of the next 3 months\"    Refill request submitted with refills.    CATALINO Peterson  "

## 2023-08-01 ENCOUNTER — TRANSFERRED RECORDS (OUTPATIENT)
Dept: MULTI SPECIALTY CLINIC | Facility: CLINIC | Age: 53
End: 2023-08-01

## 2023-08-01 LAB — RETINOPATHY: NEGATIVE

## 2023-08-13 DIAGNOSIS — E78.5 HYPERLIPIDEMIA LDL GOAL <100: ICD-10-CM

## 2023-08-13 DIAGNOSIS — E11.9 TYPE 2 DIABETES MELLITUS WITHOUT COMPLICATION, WITHOUT LONG-TERM CURRENT USE OF INSULIN (H): ICD-10-CM

## 2023-08-14 RX ORDER — ATORVASTATIN CALCIUM 10 MG/1
TABLET, FILM COATED ORAL
Qty: 90 TABLET | Refills: 0 | Status: SHIPPED | OUTPATIENT
Start: 2023-08-14 | End: 2023-09-07

## 2023-08-17 ENCOUNTER — PRENATAL OFFICE VISIT (OUTPATIENT)
Dept: OBGYN | Facility: CLINIC | Age: 53
End: 2023-08-17
Payer: COMMERCIAL

## 2023-08-17 VITALS — SYSTOLIC BLOOD PRESSURE: 130 MMHG | WEIGHT: 129 LBS | BODY MASS INDEX: 22.85 KG/M2 | DIASTOLIC BLOOD PRESSURE: 76 MMHG

## 2023-08-17 DIAGNOSIS — N93.9 ABNORMAL UTERINE BLEEDING: Primary | ICD-10-CM

## 2023-08-17 DIAGNOSIS — Z12.31 ENCOUNTER FOR SCREENING MAMMOGRAM FOR BREAST CANCER: ICD-10-CM

## 2023-08-17 PROCEDURE — 99213 OFFICE O/P EST LOW 20 MIN: CPT | Performed by: OBSTETRICS & GYNECOLOGY

## 2023-08-17 NOTE — Clinical Note
Could you please contact the patient and help her get a follow-up mammogram done.  I placed the order.  Thank you for your help with this

## 2023-08-17 NOTE — PROGRESS NOTES
The patient is a 53-year-old, [place]  female, para 2-0-0-2, last menstrual period 05/12/2023, who had been on Junel 1/20.  I was asked to evaluate for menorrhagia.  The patient has been having prolonged heavy menses in the preceding weeks, but they have been worse in the several days prior to my initial visit.  She was utilizing sanitary napkins at least every hour to hour and a half for the preceding 3-4 days.  The patient was reporting syncope but denied any trauma or other significant injury.  The patient was noted to have profound anemia to 5.5 and was transfused.  Ultrasound examination revealed a heterogeneous, vascularized lesion within the endometrial cavity, measuring 1.4 x 1.6 x 1.8 cm, suggestive of a possible polyp .  On May 30, 2023 the patient underwent an exam under anesthesia hysteroscopy with the MyoSure device and fractional D&C with a return of benign pathology and fragments suggestive of a leiomyoma.  Postoperatively she has been cycle with Provera 10 mg daily from days 1 through 10 for each of the last 3 months.  She notices that her menses are much lighter without unscheduled or irregular flow.  She denies vasovagal symptoms or symptoms to suggest anemia.  I reviewed with the patient the need for contraception.  The patient is due for colonoscopy on February 2029.  The patient follows with Dr. Smith for her diabetes.  The patient last had a mammogram Aretha 3, 2022.  I will place a future order for this    Past Medical History:   Diagnosis Date    Abnormal Pap smear of cervix 03/31/2022    Cervical high risk HPV (human papillomavirus) test positive 11/23/2018 5/13/21    Fatty liver 05/21/2012    GBS (group B streptococcus) infection 09/04/2008    gestational diabetes     Gestational diabetes mellitus, antepartum 03/09/2009    H/O colposcopy with cervical biopsy 07/08/2021    History of blood transfusion     Hyperlipidemia LDL goal < 130 07/13/2010    Hypertension     Liver function  test abnormality 07/13/2010    Pregnancies, high-risk 03/19/2009     Current Outpatient Medications   Medication    ACE/ARB/ARNI NOT PRESCRIBED (INTENTIONAL)    acetaminophen (TYLENOL) 325 MG tablet    amLODIPine (NORVASC) 5 MG tablet    atorvastatin (LIPITOR) 10 MG tablet    blood glucose monitoring (ACCU-CHEK BECKY PLUS) meter device kit    carboxymethylcellulose PF (REFRESH PLUS) 0.5 % ophthalmic solution    CINNAMON PO    ferrous sulfate (SLO-FE) 142 (45 Fe) MG CR tablet    glimepiride (AMARYL) 2 MG tablet    ibuprofen (ADVIL/MOTRIN) 200 MG tablet    medroxyPROGESTERone (PROVERA) 10 MG tablet    metFORMIN (GLUCOPHAGE) 500 MG tablet    MULTIVITAMINS PO TABS     No current facility-administered medications for this visit.     /76   Wt 58.5 kg (129 lb)   LMP 08/13/2023   BMI 22.85 kg/m      Constitutional: healthy, alert, and no distress    Hemoglobin   Date Value Ref Range Status   06/09/2023 11.8 11.7 - 15.7 g/dL Final   06/14/2021 11.2 (L) 11.7 - 15.7 g/dL Final   ]  (N93.9) Abnormal uterine bleeding  (primary encounter diagnosis)  Comment: I again reviewed the findings with the patient today we also had a lengthy discussion regarding menopause.  At this point I Hillary stop the Provera and observe over the next 3 months.  If she again develops abnormal bleeding heavy flow or unscheduled flow I will see her prior otherwise the patient will keep a bleeding calendar and I will see her back in 3 months for follow-up.  The importance of adequate contraception was discussed with her and I think she has a good understanding  Plan: A detailed written plan was given    (Z12.31) Encounter for screening mammogram for breast cancer  Comment: I placed an order for mammogram and she is due  Plan: *MA Screening Digital Bilateral        Appropriate therapy based on the findings

## 2023-08-17 NOTE — NURSING NOTE
"Chief Complaint   Patient presents with    Surgical Followup            Initial /76   Wt 58.5 kg (129 lb)   LMP 2023   BMI 22.85 kg/m   Estimated body mass index is 22.85 kg/m  as calculated from the following:    Height as of 23: 1.6 m (5' 3\").    Weight as of this encounter: 58.5 kg (129 lb).  BP completed using cuff size: regular    Questioned patient about current smoking habits.  Pt. has never smoked.          The following HM Due: NONE      The following patient reported/Care Every where data was sent to:  P ABSTRACT QUALITY INITIATIVES [[id number]]        Charlee Cabrera CMA                   "

## 2023-08-17 NOTE — PATIENT INSTRUCTIONS
You can reach your [facility] Care Team any time of the day by calling [phone]. This number will put you in touch with the 24 hour nurse line if the clinic is closed.    To contact your OB/GYN Station Coordinator/Surgery Scheduler please call [phone]. This is a direct number for your care team between 8 a.m. and 4 p.m. Monday through Friday.    [facility] is open for your convenience:  Monday through Friday 8 a.m. to 6 p.m.  Closed weekends and all major holidays.

## 2023-09-05 NOTE — PROGRESS NOTES
SUBJECTIVE:   CC: Theresa is an 53 year old who presents for preventive health visit.       Healthy Habits:     Getting at least 3 servings of Calcium per day:  Yes    Bi-annual eye exam:  Yes    Dental care twice a year:  Yes    Sleep apnea or symptoms of sleep apnea:  None    Diet:  Low fat/cholesterol, Diabetic and Vegetarian/vegan    Frequency of exercise:  6-7 days/week    Duration of exercise:  15-30 minutes    Taking medications regularly:  Yes    Medication side effects:  None    Additional concerns today:  No    Other concerns:  1. Left foot great toenail pain, numbness       Social History     Tobacco Use    Smoking status: Never    Smokeless tobacco: Never   Substance Use Topics    Alcohol use: No         2023     9:19 AM   Alcohol Use   Prescreen: >3 drinks/day or >7 drinks/week? No     Reviewed orders with patient.  Reviewed health maintenance and updated orders accordingly - Yes  Lab work is in process    Breast Cancer Screenin/22/2022     9:22 AM   Breast CA Risk Assessment (FHS-7)   Do you have a family history of breast, colon, or ovarian cancer? No / Unknown     Pertinent mammograms are reviewed under the imaging tab.    History of abnormal Pap smear: NO - age 30-65 PAP every 5 years with negative HPV co-testing recommended      Latest Ref Rng & Units 2022     3:12 PM 3/31/2022     2:44 PM 2021     3:55 PM   PAP / HPV   PAP  Negative for Intraepithelial Lesion or Malignancy (NILM)  Atypical squamous cells of undetermined significance (ASC-US)     PAP (Historical)    NIL    HPV 16 DNA Negative Negative  Negative     HPV 18 DNA Negative Negative  Negative     Other HR HPV Negative Positive  Negative       Reviewed and updated as needed this visit by clinical staff   Tobacco  Allergies  Meds              Reviewed and updated as needed this visit by Provider                 Current Outpatient Medications   Medication    ACE/ARB/ARNI NOT PRESCRIBED (INTENTIONAL)     "acetaminophen (TYLENOL) 325 MG tablet    amLODIPine (NORVASC) 5 MG tablet    atorvastatin (LIPITOR) 10 MG tablet    blood glucose monitoring (ACCU-CHEK BECKY PLUS) meter device kit    carboxymethylcellulose PF (REFRESH PLUS) 0.5 % ophthalmic solution    CINNAMON PO    glimepiride (AMARYL) 2 MG tablet    ibuprofen (ADVIL/MOTRIN) 200 MG tablet    medroxyPROGESTERone (PROVERA) 10 MG tablet    metFORMIN (GLUCOPHAGE) 500 MG tablet    MULTIVITAMINS PO TABS    ferrous sulfate (SLO-FE) 142 (45 Fe) MG CR tablet     No current facility-administered medications for this visit.         Review of Systems   Constitutional:  Negative for chills and fever.   HENT:  Negative for congestion, ear pain, hearing loss and sore throat.    Eyes:  Negative for pain and visual disturbance.   Respiratory:  Negative for cough and shortness of breath.    Cardiovascular:  Negative for chest pain, palpitations and peripheral edema.   Gastrointestinal:  Negative for abdominal pain, constipation, diarrhea, heartburn, hematochezia and nausea.   Breasts:  Negative for tenderness, breast mass and discharge.   Genitourinary:  Negative for dysuria, frequency, genital sores, hematuria, pelvic pain, urgency, vaginal bleeding and vaginal discharge.   Musculoskeletal:  Negative for arthralgias, joint swelling and myalgias.   Skin:  Negative for rash.   Neurological:  Negative for dizziness, weakness, headaches and paresthesias.   Psychiatric/Behavioral:  Negative for mood changes. The patient is not nervous/anxious.      Eye exam via [facility] 8/2023, no DM changes, [facility] each eye 20/20.    OBJECTIVE:   /84   Pulse 95   Temp 98.5  F (36.9  C) (Temporal)   Resp 14   Ht 1.6 m (5' 3\")   Wt 58 kg (127 lb 12.8 oz)   LMP 08/13/2023   SpO2 99%   BMI 22.64 kg/m    Physical Exam  GENERAL: healthy, alert and no distress  EYES: Eyes grossly normal to inspection, PERRL and conjunctivae and sclerae normal  HENT: ear canals and TM's normal, nose and mouth " without ulcers or lesions  NECK: no adenopathy, no asymmetry, masses, or scars and thyroid normal to palpation  RESP: lungs clear to auscultation - no rales, rhonchi or wheezes  BREAST:  deferred per patient per OB per request  CV: regular rate and rhythm, normal S1 S2, no S3 or S4, no murmur, click or rub  ABDOMEN: soft, nontender, no hepatosplenomegaly, no masses and bowel sounds normal  Patient's Pap smear is scheduled per her gynecologist upcoming in November  MS: no gross musculoskeletal defects noted, no edema  Mild thickening of the great toenail on the right foot  NEURO: No focal changes  PSYCH: mentation appears normal, affect normal/bright      ASSESSMENT/PLAN:   (Z00.00) Routine general medical examination at a health care facility  (primary encounter diagnosis)  Comment: Discussed updating routine vaccinations with patient  Plan: Comprehensive metabolic panel, Lipid Profile       Reviewed with patient need to see dermatology clinic in regards to thickening of great toenail on right foot    (E11.9) Type 2 diabetes mellitus without complication, without long-term current use of insulin (H)  Comment: Labs ordered as fasting per routine, medications refilled.  Diabetic eye exam performed.  Plan: Lipid Profile, Fecal colorectal cancer screen         FIT, Hemoglobin A1c, Albumin Random Urine         Quantitative with Creat Ratio, metFORMIN         (GLUCOPHAGE) 500 MG tablet, OFFICE/OUTPT         VISIT,EST,LEVL III            (E78.5) Hyperlipidemia LDL goal <100  Comment: Labs ordered as fasting per routine.  Check lipid panel.  Refill medication accordingly per request.  Plan: Lipid Profile, atorvastatin (LIPITOR) 10 MG         tablet, OFFICE/OUTPT VISIT,EST,LEVL III            (I10) Benign essential hypertension  Comment: Stable on therapy and continue with current medical management.  Plan:             (Z12.31) Visit for screening mammogram  Comment: Patient scheduled for mammography today.  She is following  up with her breast exam with her gynecologist  Plan:       (Z12.11) Colon cancer screening  Comment: Colonoscopy reviewed and recommended FIT testing.  Plan: Fecal colorectal cancer screen FIT    (Z12.4) Cervical cancer screening  Comment: Scheduled for upcoming Pap smear through her gynecologist.  Plan:       Patient has been advised of split billing requirements and indicates understanding: Yes      COUNSELING:  Reviewed preventive health counseling, as reflected in patient instructions       Regular exercise       Healthy diet/nutrition        She reports that she has never smoked. She has never used smokeless tobacco.      Arsenio Smith MD  [facility]

## 2023-09-07 ENCOUNTER — ANCILLARY PROCEDURE (OUTPATIENT)
Dept: MAMMOGRAPHY | Facility: CLINIC | Age: 53
End: 2023-09-07
Attending: OBSTETRICS & GYNECOLOGY
Payer: COMMERCIAL

## 2023-09-07 ENCOUNTER — OFFICE VISIT (OUTPATIENT)
Dept: INTERNAL MEDICINE | Facility: CLINIC | Age: 53
End: 2023-09-07
Payer: COMMERCIAL

## 2023-09-07 VITALS
OXYGEN SATURATION: 99 % | SYSTOLIC BLOOD PRESSURE: 138 MMHG | TEMPERATURE: 98.5 F | WEIGHT: 127.8 LBS | RESPIRATION RATE: 14 BRPM | HEART RATE: 95 BPM | DIASTOLIC BLOOD PRESSURE: 84 MMHG | BODY MASS INDEX: 22.64 KG/M2 | HEIGHT: 63 IN

## 2023-09-07 DIAGNOSIS — E11.9 TYPE 2 DIABETES MELLITUS WITHOUT COMPLICATION, WITHOUT LONG-TERM CURRENT USE OF INSULIN (H): ICD-10-CM

## 2023-09-07 DIAGNOSIS — Z12.4 CERVICAL CANCER SCREENING: ICD-10-CM

## 2023-09-07 DIAGNOSIS — Z12.31 VISIT FOR SCREENING MAMMOGRAM: ICD-10-CM

## 2023-09-07 DIAGNOSIS — D62 ANEMIA DUE TO BLOOD LOSS, ACUTE: ICD-10-CM

## 2023-09-07 DIAGNOSIS — Z12.11 COLON CANCER SCREENING: ICD-10-CM

## 2023-09-07 DIAGNOSIS — N93.9 ABNORMAL UTERINE BLEEDING: ICD-10-CM

## 2023-09-07 DIAGNOSIS — E78.5 HYPERLIPIDEMIA LDL GOAL <100: ICD-10-CM

## 2023-09-07 DIAGNOSIS — I10 BENIGN ESSENTIAL HYPERTENSION: ICD-10-CM

## 2023-09-07 DIAGNOSIS — Z12.31 ENCOUNTER FOR SCREENING MAMMOGRAM FOR BREAST CANCER: ICD-10-CM

## 2023-09-07 DIAGNOSIS — Z00.00 ROUTINE GENERAL MEDICAL EXAMINATION AT A HEALTH CARE FACILITY: Primary | ICD-10-CM

## 2023-09-07 LAB
ALBUMIN SERPL BCG-MCNC: 4.9 G/DL (ref 3.5–5.2)
ALP SERPL-CCNC: 71 U/L (ref 35–104)
ALT SERPL W P-5'-P-CCNC: 53 U/L (ref 0–50)
ANION GAP SERPL CALCULATED.3IONS-SCNC: 13 MMOL/L (ref 7–15)
AST SERPL W P-5'-P-CCNC: 47 U/L (ref 0–45)
BILIRUB SERPL-MCNC: 0.8 MG/DL
BUN SERPL-MCNC: 6.4 MG/DL (ref 6–20)
CALCIUM SERPL-MCNC: 9.4 MG/DL (ref 8.6–10)
CHLORIDE SERPL-SCNC: 99 MMOL/L (ref 98–107)
CHOLEST SERPL-MCNC: 163 MG/DL
CREAT SERPL-MCNC: 0.51 MG/DL (ref 0.51–0.95)
CREAT UR-MCNC: 30.3 MG/DL
DEPRECATED HCO3 PLAS-SCNC: 25 MMOL/L (ref 22–29)
EGFRCR SERPLBLD CKD-EPI 2021: >90 ML/MIN/1.73M2
GLUCOSE SERPL-MCNC: 172 MG/DL (ref 70–99)
HBA1C MFR BLD: 10.4 % (ref 0–5.6)
HDLC SERPL-MCNC: 39 MG/DL
LDLC SERPL CALC-MCNC: 93 MG/DL
MICROALBUMIN UR-MCNC: 271 MG/L
MICROALBUMIN/CREAT UR: 894.39 MG/G CR (ref 0–25)
NONHDLC SERPL-MCNC: 124 MG/DL
POTASSIUM SERPL-SCNC: 3.8 MMOL/L (ref 3.4–5.3)
PROT SERPL-MCNC: 7.8 G/DL (ref 6.4–8.3)
SODIUM SERPL-SCNC: 137 MMOL/L (ref 136–145)
TRIGL SERPL-MCNC: 153 MG/DL

## 2023-09-07 PROCEDURE — 36415 COLL VENOUS BLD VENIPUNCTURE: CPT | Performed by: INTERNAL MEDICINE

## 2023-09-07 PROCEDURE — 99213 OFFICE O/P EST LOW 20 MIN: CPT | Mod: 25 | Performed by: INTERNAL MEDICINE

## 2023-09-07 PROCEDURE — 82043 UR ALBUMIN QUANTITATIVE: CPT | Performed by: INTERNAL MEDICINE

## 2023-09-07 PROCEDURE — 82570 ASSAY OF URINE CREATININE: CPT | Performed by: INTERNAL MEDICINE

## 2023-09-07 PROCEDURE — 99396 PREV VISIT EST AGE 40-64: CPT | Performed by: INTERNAL MEDICINE

## 2023-09-07 PROCEDURE — 77067 SCR MAMMO BI INCL CAD: CPT | Mod: TC | Performed by: RADIOLOGY

## 2023-09-07 PROCEDURE — 80053 COMPREHEN METABOLIC PANEL: CPT | Performed by: INTERNAL MEDICINE

## 2023-09-07 PROCEDURE — 80061 LIPID PANEL: CPT | Performed by: INTERNAL MEDICINE

## 2023-09-07 PROCEDURE — 83036 HEMOGLOBIN GLYCOSYLATED A1C: CPT | Performed by: INTERNAL MEDICINE

## 2023-09-07 RX ORDER — ATORVASTATIN CALCIUM 10 MG/1
10 TABLET, FILM COATED ORAL DAILY
Qty: 90 TABLET | Refills: 3 | Status: SHIPPED | OUTPATIENT
Start: 2023-09-07 | End: 2024-01-22

## 2023-09-07 ASSESSMENT — ENCOUNTER SYMPTOMS
DIZZINESS: 0
HEADACHES: 0
FREQUENCY: 0
ABDOMINAL PAIN: 0
SHORTNESS OF BREATH: 0
DYSURIA: 0
MYALGIAS: 0
PARESTHESIAS: 0
DIZZINESS: 1
HEARTBURN: 0
ARTHRALGIAS: 0
DIARRHEA: 0
CHILLS: 0
EYE PAIN: 0
FEVER: 0
NERVOUS/ANXIOUS: 0
BREAST MASS: 0
PALPITATIONS: 0
JOINT SWELLING: 0
CONSTIPATION: 0
COUGH: 0
NAUSEA: 0
SORE THROAT: 0
HEMATOCHEZIA: 0
WEAKNESS: 0
HEMATURIA: 0

## 2023-09-07 NOTE — LETTER
September 12, 2023      Theresa Quarles  [address] [place] [id number]        Dear ,    We are writing to inform you of your test results.    Your Hemoglobin A1C is considerably more abnormal and indicates your blood sugars could be better controlled.  Please follow-up in the clinic to discuss some changes that need to be done.     Your liver function tests are slightly abnormal and should be rechecked here in the clinic in one month with a follow-up visit with me.  I will look forward to seeing you at that time and please call to make an appointment.     Your cholesterol looks good and I would not change anything at this point but would repeat your labs in 12 months.       Resulted Orders   Comprehensive metabolic panel   Result Value Ref Range    Sodium 137 136 - 145 mmol/L    Potassium 3.8 3.4 - 5.3 mmol/L    Chloride 99 98 - 107 mmol/L    Carbon Dioxide (CO2) 25 22 - 29 mmol/L    Anion Gap 13 7 - 15 mmol/L    Urea Nitrogen 6.4 6.0 - 20.0 mg/dL    Creatinine 0.51 0.51 - 0.95 mg/dL    Calcium 9.4 8.6 - 10.0 mg/dL    Glucose 172 (H) 70 - 99 mg/dL    Alkaline Phosphatase 71 35 - 104 U/L    AST 47 (H) 0 - 45 U/L      Comment:      Reference intervals for this test were updated on 6/12/2023 to more accurately reflect our healthy population. There may be differences in the flagging of prior results with similar values performed with this method. Interpretation of those prior results can be made in the context of the updated reference intervals.    ALT 53 (H) 0 - 50 U/L      Comment:      Reference intervals for this test were updated on 6/12/2023 to more accurately reflect our healthy population. There may be differences in the flagging of prior results with similar values performed with this method. Interpretation of those prior results can be made in the context of the updated reference intervals.      Protein Total 7.8 6.4 - 8.3 g/dL    Albumin 4.9 3.5 - 5.2 g/dL    Bilirubin Total 0.8 <=1.2 mg/dL     GFR Estimate >90 >60 mL/min/1.73m2   Lipid Profile   Result Value Ref Range    Cholesterol 163 <200 mg/dL    Triglycerides 153 (H) <150 mg/dL    Direct Measure HDL 39 (L) >=50 mg/dL    LDL Cholesterol Calculated 93 <=100 mg/dL    Non HDL Cholesterol 124 <130 mg/dL    Narrative    Cholesterol  Desirable:  <200 mg/dL    Triglycerides  Normal:  Less than 150 mg/dL  Borderline High:  150-199 mg/dL  High:  200-499 mg/dL  Very High:  Greater than or equal to 500 mg/dL    Direct Measure HDL  Female:  Greater than or equal to 50 mg/dL   Male:  Greater than or equal to 40 mg/dL    LDL Cholesterol  Desirable:  <100mg/dL  Above Desirable:  100-129 mg/dL   Borderline High:  130-159 mg/dL   High:  160-189 mg/dL   Very High:  >= 190 mg/dL    Non HDL Cholesterol  Desirable:  130 mg/dL  Above Desirable:  130-159 mg/dL  Borderline High:  160-189 mg/dL  High:  190-219 mg/dL  Very High:  Greater than or equal to 220 mg/dL   Hemoglobin A1c   Result Value Ref Range    Hemoglobin A1C 10.4 (H) 0.0 - 5.6 %    Narrative    Verified by repeat analysis     Albumin Random Urine Quantitative with Creat Ratio   Result Value Ref Range    Creatinine Urine mg/dL 30.3 mg/dL      Comment:      The reference ranges have not been established in urine creatinine. The results should be integrated into the clinical context for interpretation.    Albumin Urine mg/L 271.0 mg/L      Comment:      The reference ranges have not been established in urine albumin. The results should be integrated into the clinical context for interpretation.    Albumin Urine mg/g Cr 894.39 (H) 0.00 - 25.00 mg/g Cr      Comment:      Microalbuminuria is defined as an albumin:creatinine ratio of 17 to 299 for males and 25 to 299 for females. A ratio of albumin:creatinine of 300 or higher is indicative of overt proteinuria.  Due to biologic variability, positive results should be confirmed by a second, first-morning random or 24-hour timed urine specimen. If there is  discrepancy, a third specimen is recommended. When 2 out of 3 results are in the microalbuminuria range, this is evidence for incipient nephropathy and warrants increased efforts at glucose control, blood pressure control, and institution of therapy with an angiotensin-converting-enzyme (ACE) inhibitor (if the patient can tolerate it).         If you have any questions or concerns, please call the clinic at the number listed above.       Sincerely,      Arsenio Smith MD

## 2023-09-17 ENCOUNTER — OFFICE VISIT (OUTPATIENT)
Dept: URGENT CARE | Facility: URGENT CARE | Age: 53
End: 2023-09-17
Payer: COMMERCIAL

## 2023-09-17 VITALS
DIASTOLIC BLOOD PRESSURE: 89 MMHG | WEIGHT: 127 LBS | TEMPERATURE: 98.7 F | OXYGEN SATURATION: 99 % | BODY MASS INDEX: 22.5 KG/M2 | SYSTOLIC BLOOD PRESSURE: 145 MMHG | HEART RATE: 88 BPM | RESPIRATION RATE: 16 BRPM

## 2023-09-17 DIAGNOSIS — Z20.822 SUSPECTED 2019 NOVEL CORONAVIRUS INFECTION: Primary | ICD-10-CM

## 2023-09-17 DIAGNOSIS — R07.0 THROAT PAIN: ICD-10-CM

## 2023-09-17 DIAGNOSIS — R05.1 ACUTE COUGH: ICD-10-CM

## 2023-09-17 LAB
DEPRECATED S PYO AG THROAT QL EIA: NEGATIVE
GROUP A STREP BY PCR: NOT DETECTED

## 2023-09-17 PROCEDURE — 99215 OFFICE O/P EST HI 40 MIN: CPT | Performed by: PHYSICIAN ASSISTANT

## 2023-09-17 PROCEDURE — 87635 SARS-COV-2 COVID-19 AMP PRB: CPT | Performed by: PHYSICIAN ASSISTANT

## 2023-09-17 PROCEDURE — 87651 STREP A DNA AMP PROBE: CPT | Performed by: PHYSICIAN ASSISTANT

## 2023-09-17 RX ORDER — BENZONATATE 200 MG/1
200 CAPSULE ORAL 3 TIMES DAILY PRN
Qty: 30 CAPSULE | Refills: 0 | Status: SHIPPED | OUTPATIENT
Start: 2023-09-17 | End: 2023-11-14

## 2023-09-17 NOTE — LETTER
September 17, 2023      Theresa Quarles  [address] [place] [phone]        To Whom It May Concern:    Theresa Quarles was seen in our clinic and is being treated for presumed COVID 19.  She will follow the CDC's guidance on return to work.        Sincerely,        Sofia Baird PA-C

## 2023-09-17 NOTE — PROGRESS NOTES
(Z20.822) Suspected 2019 novel coronavirus infection  (primary encounter diagnosis)  Comment: given the fact that your children have tested positive for COVID-19 within the past week, it is likely that you have COVID today.  Therefore I recommend starting the antiviral treatment for you, given your underlying past medical history.  Plan: Symptomatic COVID-19 Virus (Coronavirus) by PCR        Nasopharyngeal, nirmatrelvir and ritonavir         (PAXLOVID) 300 mg/100 mg therapy pack        Please stop your Lipitor while you are on the Paxlovid.      (R07.0) Throat pain  Comment:   Plan: Streptococcus A Rapid Screen w/Reflex to PCR -         Clinic Collect, Group A Streptococcus PCR         Throat Swab      (R05.1) Acute cough  Comment:   Plan: Tessalon perles.      See CDC's Covid page for isolation information and return to work.    https://www.cdc.gov/coronavirus/2019-ncov/your-health/isolation.html    If not improving or if condition worsens, follow up with your Primary Care Provider      47 minutes spent by me on the date of the encounter doing chart review, interpretation of tests, patient visit, documentation, discussion with family, and review of drug interactions for Covid antiviral treatment.            SUBJECTIVE:   Theresa Quarles is a 53 year old female  who presents today with cough today, and sore throat since yesterday.  Her children tested positive for Covid earlier this week.          Past Medical History:   Diagnosis Date    Abnormal Pap smear of cervix 03/31/2022    Cervical high risk HPV (human papillomavirus) test positive 11/23/2018 5/13/21    Fatty liver 05/21/2012    GBS (group B streptococcus) infection 09/04/2008    gestational diabetes     Gestational diabetes mellitus, antepartum 03/09/2009    H/O colposcopy with cervical biopsy 07/08/2021    History of blood transfusion     Hyperlipidemia LDL goal < 130 07/13/2010    Hypertension     Liver function test abnormality 07/13/2010     Pregnancies, high-risk 03/19/2009         Current Outpatient Medications   Medication Sig Dispense Refill    Multiple Vitamins-Iron (DAILY-CHEYENNE/IRON/BETA-CAROTENE) TABS TAKE 1 TABLET BY MOUTH DAILY. (Patient not taking: Reported on 10/19/2020) 30 tablet 7     Social History     Tobacco Use    Smoking status: Never Smoker    Smokeless tobacco: Never Used   Substance Use Topics    Alcohol use: Not on file     Family History   Problem Relation Age of Onset    Diabetes Mother     Diabetes Father          ROS:    10 point ROS of systems including Constitutional, Eyes, Respiratory, Cardiovascular, Gastroenterology, Genitourinary, Integumentary, Muscularskeletal, Psychiatric ,neurological were all negative except for pertinent positives noted in my HPI       OBJECTIVE:  BP (!) 145/89   Pulse 88   Temp 98.7  F (37.1  C)   Resp 16   Wt 57.6 kg (127 lb)   LMP 08/13/2023   SpO2 99%   BMI 22.50 kg/m    Physical Exam:  GENERAL APPEARANCE: healthy, alert and no distress  EYES: EOMI,  PERRL, conjunctiva clear  HENT: ear canals and TM's normal.  Nose and mouth without ulcers, erythema or lesions  NECK: supple, nontender, no lymphadenopathy  RESP: lungs clear to auscultation - no rales, rhonchi or wheezes  CV: regular rates and rhythm, normal S1 S2, no murmur noted  ABDOMEN:  soft, nontender, no HSM or masses and bowel sounds normal  NEURO: Normal strength and tone, sensory exam grossly normal,  normal speech and mentation  SKIN: no suspicious lesions or rashes    Results for orders placed or performed in visit on 09/17/23   Streptococcus A Rapid Screen w/Reflex to PCR - Clinic Collect     Status: Normal    Specimen: Throat; Swab   Result Value Ref Range    Group A Strep antigen Negative Negative

## 2023-09-17 NOTE — PATIENT INSTRUCTIONS
(Z20.822) Suspected 2019 novel coronavirus infection  (primary encounter diagnosis)  Comment: given the fact that your children have tested positive for COVID-19 within the past week, it is likely that you have COVID today.  Therefore I recommend starting the antiviral treatment for you, given your underlying past medical history.  Plan: Symptomatic COVID-19 Virus (Coronavirus) by PCR        Nasopharyngeal, nirmatrelvir and ritonavir         (PAXLOVID) 300 mg/100 mg therapy pack        Please stop your Lipitor while you are on the Paxlovid.      (R07.0) Throat pain  Comment:   Plan: Streptococcus A Rapid Screen w/Reflex to PCR -         Clinic Collect, Group A Streptococcus PCR         Throat Swab      (R05.1) Acute cough  Comment:   Plan: Tessalon perles.      See CDC's Covid page for isolation information and return to work.    https://www.cdc.gov/coronavirus/2019-ncov/your-health/isolation.html

## 2023-09-18 LAB — SARS-COV-2 RNA RESP QL NAA+PROBE: POSITIVE

## 2023-09-19 ENCOUNTER — TRANSFERRED RECORDS (OUTPATIENT)
Dept: HEALTH INFORMATION MANAGEMENT | Facility: CLINIC | Age: 53
End: 2023-09-19
Payer: COMMERCIAL

## 2023-09-27 ENCOUNTER — TRANSFERRED RECORDS (OUTPATIENT)
Dept: HEALTH INFORMATION MANAGEMENT | Facility: CLINIC | Age: 53
End: 2023-09-27
Payer: COMMERCIAL

## 2023-09-29 PROCEDURE — 82274 ASSAY TEST FOR BLOOD FECAL: CPT | Performed by: INTERNAL MEDICINE

## 2023-10-02 LAB — HEMOCCULT STL QL IA: NEGATIVE

## 2023-10-06 ENCOUNTER — TRANSFERRED RECORDS (OUTPATIENT)
Dept: HEALTH INFORMATION MANAGEMENT | Facility: CLINIC | Age: 53
End: 2023-10-06
Payer: COMMERCIAL

## 2023-11-13 NOTE — PROGRESS NOTES
Assessment & Plan     Type 2 diabetes mellitus without complication, without long-term current use of insulin (H)  Discussed options with patient in regards to additional medication.  Patient interested in keeping meds the same thus will increase to 4 mg of glimepiride and continue with metformin as dosed, recheck A1c level in 3 months.  Discussed benefit of injectable or newer updated medications such as Jardiance as addition  - glimepiride (AMARYL) 4 MG tablet; Take 1 tablet (4 mg) by mouth every morning (before breakfast)  - Hemoglobin A1c; Future  - amLODIPine (NORVASC) 5 MG tablet; Take 1 tablet (5 mg) by mouth daily    Benign essential hypertension  Stable on therapy and continuing with medical management  - amLODIPine (NORVASC) 5 MG tablet; Take 1 tablet (5 mg) by mouth daily    Hyperlipidemia LDL goal <100  LDL Cholesterol Calculated   Date Value Ref Range Status   09/07/2023 93 <=100 mg/dL Final   06/14/2021 69 <100 mg/dL Final     Comment:     Desirable:       <100 mg/dl     Iliac goal on statin therapy    Cervical cancer screening  Schedule as noted with Dr. Arriaza November 24       See Patient Instructions    Arsenio Smith MD  [facility]    Jaxon Cardenas is a 53 year old, presenting for the following health issues:  RECHECK (Labs)      History of Present Illness       Reason for visit:  Hemologbin following check up    She eats 2-3 servings of fruits and vegetables daily.She consumes 0 sweetened beverage(s) daily.She exercises with enough effort to increase her heart rate 20 to 29 minutes per day.  She exercises with enough effort to increase her heart rate 5 days per week.   She is taking medications regularly.     She states that her blood sugars at home have been somewhat better although we reviewed her A1c level.  She denies any major complaints.  She does need a few medication refills.  She states that she is scheduled to see Dr. Arriaza in the gynecology  "clinic for her Pap smear as noted.  She denies any other systemic complaints.      Review of Systems   CONSTITUTIONAL: NEGATIVE for fever, chills, change in weight  EYES: NEGATIVE for vision changes or irritation  ENT/MOUTH: NEGATIVE for ear, mouth and throat problems  RESP: NEGATIVE for significant cough or SOB  CV: NEGATIVE for chest pain, palpitations or peripheral edema  GI: NEGATIVE for nausea, abdominal pain, heartburn, or change in bowel habits  : NEGATIVE for frequency, dysuria, or hematuria  MUSCULOSKELETAL: NEGATIVE for significant arthralgias or myalgia  NEURO: NEGATIVE for weakness, dizziness or paresthesias  ENDOCRINE: NEGATIVE for temperature intolerance, skin/hair changes  HEME: NEGATIVE for bleeding problems  PSYCHIATRIC: NEGATIVE for changes in mood or affect      Objective    /82   Pulse 88   Temp 98.1  F (36.7  C) (Temporal)   Resp 17   Ht 1.6 m (5' 3\")   Wt 57.8 kg (127 lb 6.4 oz)   SpO2 99%   BMI 22.57 kg/m    Body mass index is 22.57 kg/m .    Physical Exam   GENERAL: healthy, alert and no distress  EYES: Eyes grossly normal to inspection, PERRL and conjunctivae and sclerae normal  HENT: ear canals and TM's normal, nose and mouth without ulcers or lesions  NECK: no adenopathy, no asymmetry, masses, or scars and thyroid normal to palpation  RESP: lungs clear to auscultation - no rales, rhonchi or wheezes  CV: regular rate and rhythm, normal S1 S2, no S3 or S4, no murmur, click or rub  PSYCH: mentation appears normal, affect normal/bright    Lab Results   Component Value Date    A1C 10.4 09/07/2023    A1C 7.7 11/22/2022    A1C 8.0 06/03/2022    A1C 8.0 01/06/2022    A1C 7.7 06/14/2021    A1C 7.7 09/05/2020    A1C 9.2 05/09/2020    A1C 9.0 11/04/2019    A1C 7.8 09/07/2019     Creatinine   Date Value Ref Range Status   09/07/2023 0.51 0.51 - 0.95 mg/dL Final   06/14/2021 0.55 0.52 - 1.04 mg/dL Final     LDL Cholesterol Calculated   Date Value Ref Range Status   09/07/2023 93 <=100 " mg/dL Final   06/14/2021 69 <100 mg/dL Final     Comment:     Desirable:       <100 mg/dl     Lab Results   Component Value Date    ALT 53 09/07/2023    ALT 24 06/14/2021

## 2023-11-14 ENCOUNTER — OFFICE VISIT (OUTPATIENT)
Dept: INTERNAL MEDICINE | Facility: CLINIC | Age: 53
End: 2023-11-14
Payer: COMMERCIAL

## 2023-11-14 VITALS
RESPIRATION RATE: 17 BRPM | OXYGEN SATURATION: 99 % | WEIGHT: 127.4 LBS | HEART RATE: 88 BPM | BODY MASS INDEX: 22.57 KG/M2 | HEIGHT: 63 IN | SYSTOLIC BLOOD PRESSURE: 130 MMHG | DIASTOLIC BLOOD PRESSURE: 82 MMHG | TEMPERATURE: 98.1 F

## 2023-11-14 DIAGNOSIS — Z12.4 CERVICAL CANCER SCREENING: ICD-10-CM

## 2023-11-14 DIAGNOSIS — E78.5 HYPERLIPIDEMIA LDL GOAL <100: ICD-10-CM

## 2023-11-14 DIAGNOSIS — E11.9 TYPE 2 DIABETES MELLITUS WITHOUT COMPLICATION, WITHOUT LONG-TERM CURRENT USE OF INSULIN (H): Primary | ICD-10-CM

## 2023-11-14 DIAGNOSIS — I10 BENIGN ESSENTIAL HYPERTENSION: ICD-10-CM

## 2023-11-14 PROCEDURE — 90480 ADMN SARSCOV2 VAC 1/ONLY CMP: CPT | Performed by: INTERNAL MEDICINE

## 2023-11-14 PROCEDURE — 91320 SARSCV2 VAC 30MCG TRS-SUC IM: CPT | Performed by: INTERNAL MEDICINE

## 2023-11-14 PROCEDURE — 99214 OFFICE O/P EST MOD 30 MIN: CPT | Performed by: INTERNAL MEDICINE

## 2023-11-14 RX ORDER — CYCLOSPORINE 0.5 MG/ML
1 EMULSION OPHTHALMIC 2 TIMES DAILY
COMMUNITY
Start: 2023-10-30

## 2023-11-14 RX ORDER — GLIMEPIRIDE 2 MG/1
TABLET ORAL
Qty: 90 TABLET | Refills: 3 | Status: CANCELLED | OUTPATIENT
Start: 2023-11-14

## 2023-11-14 RX ORDER — GLIMEPIRIDE 4 MG/1
4 TABLET ORAL
Qty: 90 TABLET | Refills: 1 | Status: SHIPPED | OUTPATIENT
Start: 2023-11-14 | End: 2024-02-29

## 2023-11-14 RX ORDER — AMLODIPINE BESYLATE 5 MG/1
5 TABLET ORAL DAILY
Qty: 90 TABLET | Refills: 3 | Status: SHIPPED | OUTPATIENT
Start: 2023-11-14

## 2023-11-24 ENCOUNTER — OFFICE VISIT (OUTPATIENT)
Dept: OBGYN | Facility: CLINIC | Age: 53
End: 2023-11-24
Payer: COMMERCIAL

## 2023-11-24 VITALS — SYSTOLIC BLOOD PRESSURE: 120 MMHG | DIASTOLIC BLOOD PRESSURE: 82 MMHG | BODY MASS INDEX: 22.67 KG/M2 | WEIGHT: 128 LBS

## 2023-11-24 DIAGNOSIS — N93.9 ABNORMAL UTERINE BLEEDING: ICD-10-CM

## 2023-11-24 DIAGNOSIS — N87.0 DYSPLASIA OF CERVIX, LOW GRADE (CIN 1): Primary | ICD-10-CM

## 2023-11-24 PROCEDURE — 99213 OFFICE O/P EST LOW 20 MIN: CPT | Performed by: OBSTETRICS & GYNECOLOGY

## 2023-11-24 NOTE — PATIENT INSTRUCTIONS
You can reach your [facility] Care Team any time of the day by calling [phone]. This number will put you in touch with the 24 hour nurse line if the clinic is closed.    To contact your OB/GYN Station Coordinator/Surgery Scheduler please call [phone]. This is a direct number for your care team between 8 a.m. and 4 p.m. Monday through Friday.    [facility] is open for your convenience:  Monday through Friday 8 a.m. to 6 p.m.  Closed weekends and all major holidays.

## 2023-11-24 NOTE — NURSING NOTE
"Chief Complaint   Patient presents with    Abnormal Uterine Bleeding       Initial /82   Wt 58.1 kg (128 lb)   LMP 2023 (Exact Date)   BMI 22.67 kg/m   Estimated body mass index is 22.67 kg/m  as calculated from the following:    Height as of 23: 1.6 m (5' 3\").    Weight as of this encounter: 58.1 kg (128 lb).  BP completed using cuff size: regular    Questioned patient about current smoking habits.  Pt. has never smoked.          The following HM Due: pap smear      The following patient reported/Care Every where data was sent to:  P ABSTRACT QUALITY INITIATIVES [[id number]]        Charlee Cabrera [facility]                 "

## 2023-11-25 NOTE — PROGRESS NOTES
HPI:  Tehresa Quarles is a 53 year old [place]  female  Patient's last menstrual period was 2023 (exact date).  Not using anything for contraception, who presents for follow-up of abnormal uterine bleeding.  Please see the previous office visit notes for full details including the results of her hysteroscopy D&C.  Postoperatively the patient been cycled on Provera for 10 days each month for 3 months to control bleeding.  At present she is finished the course of Provera and presents today for follow-up.  The patient did not have a menses in September or 2023.  She did have mild very light menses 2023 for 2 or 3 days but had no bleeding since.  We had a lengthy discussion regarding abnormal uterine bleeding the menopause and the risk benefits and alternative forms of therapy.  We also discussed the potential for pregnancy and the need for reliable contraception until she is definitely menopausal.  We discussed the risk benefits and alternative forms of therapy and the patient would like to consider a Mirena IUD.  The patient had intercourse last evening without protection.  Risks, benefits, and alternative modes of therapy discussed at length. Pathophysiology of the disease process reviewed, all of the patients questions answered and informed consent obtained.    Past Medical History:   Diagnosis Date    Abnormal Pap smear of cervix 2022    Cervical high risk HPV (human papillomavirus) test positive 2018    Fatty liver 2012    GBS (group B streptococcus) infection 2008    gestational diabetes     Gestational diabetes mellitus, antepartum 2009    H/O colposcopy with cervical biopsy 2021    History of blood transfusion     Hyperlipidemia LDL goal < 130 2010    Hypertension     Liver function test abnormality 2010    Pregnancies, high-risk 2009     Current Outpatient Medications   Medication    ACE/ARB/ARNI NOT  PRESCRIBED (INTENTIONAL)    acetaminophen (TYLENOL) 325 MG tablet    amLODIPine (NORVASC) 5 MG tablet    atorvastatin (LIPITOR) 10 MG tablet    blood glucose monitoring (ACCU-CHEK BECKY PLUS) meter device kit    carboxymethylcellulose PF (REFRESH PLUS) 0.5 % ophthalmic solution    CINNAMON PO    glimepiride (AMARYL) 4 MG tablet    ibuprofen (ADVIL/MOTRIN) 200 MG tablet    metFORMIN (GLUCOPHAGE) 500 MG tablet    MULTIVITAMINS PO TABS    RESTASIS 0.05 % ophthalmic emulsion     No current facility-administered medications for this visit.     /82   Wt 58.1 kg (128 lb)   LMP 2023 (Exact Date)   BMI 22.67 kg/m    Constitutional: healthy, alert, and no distress  .    Past Medical History:   Diagnosis Date    Abnormal Pap smear of cervix 2022    Cervical high risk HPV (human papillomavirus) test positive 2018    Fatty liver 2012    GBS (group B streptococcus) infection 2008    gestational diabetes     Gestational diabetes mellitus, antepartum 2009    H/O colposcopy with cervical biopsy 2021    History of blood transfusion     Hyperlipidemia LDL goal < 130 2010    Hypertension     Liver function test abnormality 2010    Pregnancies, high-risk 2009     Past Surgical History:   Procedure Laterality Date    BIOPSY BREAST  2012    needle bx benign    DILATION AND CURETTAGE, OPERATIVE HYSTEROSCOPY WITH MORCELLATOR, COMBINED N/A 2023    Procedure: exam under anesthesia, hysteroscopy with the myosure device and polypectomy, fractional dilation and curettage;  Surgeon: Kraig Arriaza MD;  Location: [facility] OR     REMOVAL OF TONSILS,12+ Y/O      Tonsils 12+y.o.    ZZHC EXCISE HAND/FOOT NEUROMA       Family History   Problem Relation Age of Onset    Thyroid Disease Mother         s/p thyrodectomy    Cancer - colorectal Mother         rectal cancer    Cerebrovascular Disease Father             Appendicitis Daughter      Social History      Socioeconomic History    Marital status:      Spouse name: Not on file    Number of children: Not on file    Years of education: Not on file    Highest education level: Not on file   Occupational History    Not on file   Tobacco Use    Smoking status: Never    Smokeless tobacco: Never   Vaping Use    Vaping Use: Never used   Substance and Sexual Activity    Alcohol use: No    Drug use: No    Sexual activity: Yes     Partners: Male     Birth control/protection: Pill   Other Topics Concern    Parent/sibling w/ CABG, MI or angioplasty before 65F 55M? Not Asked   Social History Narrative    Not on file     Social Determinants of Health     Financial Resource Strain: Low Risk  (11/14/2023)    Financial Resource Strain     Within the past 12 months, have you or your family members you live with been unable to get utilities (heat, electricity) when it was really needed?: No   Food Insecurity: Low Risk  (11/14/2023)    Food Insecurity     Within the past 12 months, did you worry that your food would run out before you got money to buy more?: No     Within the past 12 months, did the food you bought just not last and you didn t have money to get more?: No   Transportation Needs: Low Risk  (11/14/2023)    Transportation Needs     Within the past 12 months, has lack of transportation kept you from medical appointments, getting your medicines, non-medical meetings or appointments, work, or from getting things that you need?: No   Physical Activity: Not on file   Stress: Not on file   Social Connections: Not on file   Interpersonal Safety: Not on file   Housing Stability: Low Risk  (11/14/2023)    Housing Stability     Do you have housing? : Yes     Are you worried about losing your housing?: No       Allergies:  Lisinopril    Current Outpatient Medications   Medication Sig Dispense Refill    ACE/ARB/ARNI NOT PRESCRIBED (INTENTIONAL) Please choose reason not prescribed from choices below.      acetaminophen (TYLENOL)  325 MG tablet Take 2 tablets (650 mg) by mouth every 4 hours as needed for mild pain 50 tablet 0    amLODIPine (NORVASC) 5 MG tablet Take 1 tablet (5 mg) by mouth daily 90 tablet 3    atorvastatin (LIPITOR) 10 MG tablet Take 1 tablet (10 mg) by mouth daily 90 tablet 3    blood glucose monitoring (ACCU-CHEK BECKY PLUS) meter device kit Use to test blood sugars 3 times daily or as directed with appropriate meter test strips and lancets and ETOH swabs, # 1 month, refill times 11 1 kit 5    carboxymethylcellulose PF (REFRESH PLUS) 0.5 % ophthalmic solution Place 1 drop into both eyes 3 times daily as needed for dry eyes      CINNAMON PO Take 1 tablet by mouth daily      glimepiride (AMARYL) 4 MG tablet Take 1 tablet (4 mg) by mouth every morning (before breakfast) 90 tablet 1    ibuprofen (ADVIL/MOTRIN) 200 MG tablet Take 2 tablets (400 mg) by mouth every 4 hours as needed for other, moderate pain or mild pain (mild and/or inflammatory pain)      metFORMIN (GLUCOPHAGE) 500 MG tablet Take 2 tablets (1,000 mg) by mouth 2 times daily (with meals) 360 tablet 3    MULTIVITAMINS PO TABS ONE DAILY 100 Tab 3    RESTASIS 0.05 % ophthalmic emulsion Place 1 drop into both eyes 2 times daily         Review Of Systems   ROS: 10 point ROS neg other than the symptoms noted above in the HPI.    Exam:  /82   Wt 58.1 kg (128 lb)   LMP 11/07/2023 (Exact Date)   BMI 22.67 kg/m    {Constitutional: healthy, alert, and no distress    Assessment/Plan:  (N87.0) Dysplasia of cervix, low grade (LOGAN 1)  (primary encounter diagnosis)  Comment: I will see the patient back after 10 to 14 days of abstinence for placement of an IUD we can do a repeat Pap and cotest at that time.  Plan: Risks, benefits, and alternative modes of therapy discussed at length. Pathophysiology of the disease process reviewed, all of the patients questions answered and informed consent obtained.  The Mirena IUD will provide excellent cycle control and contraception.   Understandably it may only be necessary for a relatively short period of time until she is confirmed to be menopausal.  The patient understands and accepts and would like to do this method    (N93.9) Abnormal uterine bleeding  Comment: As above  Plan: The patient will schedule with appropriate therapy as outlined above      Kraig Arriaza M.D. 15 minutes spent in chart review patient interview evaluation plan formulation and documentation

## 2023-12-07 ENCOUNTER — OFFICE VISIT (OUTPATIENT)
Dept: OBGYN | Facility: CLINIC | Age: 53
End: 2023-12-07
Payer: COMMERCIAL

## 2023-12-07 VITALS — SYSTOLIC BLOOD PRESSURE: 110 MMHG | BODY MASS INDEX: 22.5 KG/M2 | DIASTOLIC BLOOD PRESSURE: 66 MMHG | WEIGHT: 127 LBS

## 2023-12-07 DIAGNOSIS — N93.9 ABNORMAL UTERINE BLEEDING (AUB): ICD-10-CM

## 2023-12-07 DIAGNOSIS — Z30.430 ENCOUNTER FOR INSERTION OF INTRAUTERINE CONTRACEPTIVE DEVICE: Primary | ICD-10-CM

## 2023-12-07 LAB
HCG UR QL: NEGATIVE
INTERNAL QC OK POCT: NORMAL
POCT KIT EXPIRATION DATE: NORMAL
POCT KIT LOT NUMBER: NORMAL

## 2023-12-07 PROCEDURE — 58300 INSERT INTRAUTERINE DEVICE: CPT | Performed by: OBSTETRICS & GYNECOLOGY

## 2023-12-07 PROCEDURE — 81025 URINE PREGNANCY TEST: CPT | Performed by: OBSTETRICS & GYNECOLOGY

## 2023-12-07 NOTE — NURSING NOTE
"Chief Complaint   Patient presents with    IUD       Initial /66   Wt 57.6 kg (127 lb)   LMP 2023 (Exact Date)   BMI 22.50 kg/m   Estimated body mass index is 22.5 kg/m  as calculated from the following:    Height as of 23: 1.6 m (5' 3\").    Weight as of this encounter: 57.6 kg (127 lb).  BP completed using cuff size: regular    Questioned patient about current smoking habits.  Pt. has never smoked.          The following HM Due: NONE      The following patient reported/Care Every where data was sent to:  P ABSTRACT QUALITY INITIATIVES [[id number]]        Charlee Cabrera [facility]                 "

## 2023-12-07 NOTE — PATIENT INSTRUCTIONS
You can reach your [facility] Care Team any time of the day by calling [phone]. This number will put you in touch with the 24 hour nurse line if the clinic is closed.    To contact your OB/GYN Station Coordinator/Surgery Scheduler please call [phone]. This is a direct number for your care team between 8 a.m. and 4 p.m. Monday through Friday.    [facility] is open for your convenience:  Monday through Friday 8 a.m. to 6 p.m.  Closed weekends and all major holidays.

## 2023-12-07 NOTE — PROGRESS NOTES
IUD Insertion:  CONSULT:    Is a pregnancy test required: Yes.  Was it positive or negative?  Negative  Was a consent obtained?  Yes    Subjective: Theresa Quarles is a 53 year old SEA female  presents for IUD and desires Mirena type IUD.    Patient has been given the opportunity to ask questions about all forms of birth control, including all options appropriate for Theresa Quarles. Discussed that no method of birth control, except abstinence is 100% effective against pregnancy or sexually transmitted infection.     Theresa Quarles understands she may have the IUD removed at any time. IUD should be removed by a health care provider.    The entire insertion procedure was reviewed with the patient, including care after placement.    Patient's last menstrual period was 2023 (exact date). Has current contraception. No allergy to betadine or shellfish. Patient declines STD screening  HCG Qual Urine   Date Value Ref Range Status   2008 positive       hCG Urine Qualitative   Date Value Ref Range Status   2023 Negative Negative Final     Comment:     This test is for screening purposes.  Results should be interpreted along with the clinical picture.  Confirmation testing is available if warranted by ordering PIF930, HCG Quantitative Pregnancy.         LMP 2023 (Exact Date)     Pelvic Exam:   EG/BUS: normal genital architecture without lesions, erythema or abnormal secretions.   Vagina: moist, pink, rugae with physiologic discharge and secretions  Cervix: multiparous parous no lesions and pink, moist, closed, without lesion or CMT  Uterus: midposition, mobile, no pain  Adnexa: within normal limits and no masses, nodularity, tenderness    PROCEDURE NOTE: -- IUD Insertion    Reason for Insertion: contraception and abnormal uterine bleeding see the previous office visit notes from 2023 for further details    Premedicated with ibuprofen.  Under sterile technique, cervix was visualized with  speculum and prepped with Betadine solution swab x 3. Tenaculum was placed for stability. The uterus was gently straightened and sounded to 7.0 cm. IUD prepared for placement, and IUD inserted according to 's instructions without difficulty or significant resitance, and deployed at the fundus. The strings were visualized and trimmed to 2.0 cm from the external os. Tenaculum was removed and hemostasis noted. Speculum removed.  Patient tolerated procedure well.    Lot #   Exp:     EBL: minimal    Complications: none    ASSESSMENT: (Z30.430) Encounter for insertion of intrauterine contraceptive device  (primary encounter diagnosis)  Comment: Mirena IUD placed for cycle control and contraception.  Risks, benefits, and alternative modes of therapy discussed at length. Pathophysiology of the disease process reviewed, all of the patients questions answered and informed consent obtained.    Plan: levonorgestrel (MIRENA) 52 MG (20 mcg/day) IUD         1 each, INSERTION INTRAUTERINE DEVICE, HCG         qualitative urine POCT, US Pelvic Complete with        Transvaginal        Will get an ultrasound exam in a month to confirm proper position.  Post insertion instruction sheet was given to the patient and reviewed.  Her questions were answered    (N93.9) Abnormal uterine bleeding (AUB)  Comment: The patient will keep a bleeding calendar and we will reassess in 3 months.  She will call for any heavy or abnormal flow in the interval  Plan: Plan reviewed extensively with the patient.  She expressed understanding        PLAN:    Given 's handouts, including when to have IUD removed, list of danger s/sx, side effects and follow up recommended. Encouraged condom use for prevention of STD. Back up contraception advised for 7 days if progestin method. Advised to call for any fever, for prolonged or severe pain or bleeding, abnormal vaginal discharge, or unable to palpate strings. She was advised to use pain  medications (ibuprofen) as needed for mild to moderate pain. Advised to follow-up in clinic in 4-6 weeks for IUD string check if unable to find strings or as directed by provider.     Kraig Arriaza MD

## 2023-12-20 NOTE — TELEPHONE ENCOUNTER
Hi Dr Arriaza,    Please see problem list below and advise how you would like pap tracking updated with follow up plan.    3/14/2003 NIL pap  4/9/2004 NIL pap  4/25/2005 NIL pap  5/3/2006 NIL pap  12/27/2007 NIL pap  6/18/2009 NIL pap  6/24/2010 NIL pap  8/4/2011 NIL pap  8/30/2012 NIL pap  9/26/2013 NIL pap  10/24/2016 NIL pap, Neg HPV  11/23/18 NIL pap, + HR HPV (not 16/18). Plan 1 year cotest  4/16/20 NIL pap, neg HR HPV. Plan 3 year cotest  5/13/21 NIL pap, + HR HPV 16. Plan colp bef 8/13/21 7/8/21 [place] Bx & ECC: LOGAN 1. Plan follow up visit to discuss  7/16/21 Follow up visit: Plan per provider: Cotest in 6 months (due 1/8/22)   3/31/22 ASCUS pap, neg HPV. Per provider, plan colp due before 6/30/22.  9/22/22 COLP- Atypia. ECC- negative for dysplasia. NIL pap, HPV processing. Plan  repeat a pap with cotesting in 6 months and if that is normal then go back to yearly pap and cotesting.  If abnl consider repeat colp per provider .   9/28/22 LM for pt to call me back. Pt sent results via [employer]. Seen by patient Theresa Quarles on 9/29/2022 12:25 PM  9/29/22 LM for pt to call me back.  10/3 /22 Results of colp sent via [employer].  10/7/22 LM for pt to check mychart or call me back  10/12/22 LM for pt to call me back or check mychart  10/14/22 Result letter mailed with result and recommendation from colp. Unable to reach pt by phone and she has not checked her mychart.   3/8/23 Reminder letter  4/6/23 Reminder call- LM  5/5/23 Reminder pushed out to one year aliya  11/24/23 Appt with Dr Arriaza- note said cotest would be done at next visit.  12/7/23 Appt with Dr Arriaza. Cotest not done/  12/20/23 Note sent to Dr Arriaza    Thanks!  Dean Pierce, BSN, RN  Pap Tracking Nurse

## 2023-12-21 NOTE — TELEPHONE ENCOUNTER
Left message for patient to return my call to [phone]  eDan Pierce, DASHAN, RN   Pap Tracking Nurse

## 2023-12-21 NOTE — TELEPHONE ENCOUNTER
Response from Dr Arriaza forwarded below.    Dean--- when I saw the patient on December 7, 2023 we placed an IUD for control of abnormal bleeding and contraception.  The plan at that time was to reassess in 3 months.  When we asked the patient to come back into the office in 3 months we can review her bleeding calendar and at that time do a repeat Pap and cotest.  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  Would you be so kind please is to notify the patient.  Thank you again for all your help with this  Kraig Arriaza MD FACOG

## 2023-12-27 NOTE — TELEPHONE ENCOUNTER
Left message for patient to return my call to [phone]  Dean Pierce, DASHAN, RN   Pap Tracking Nurse

## 2024-01-03 PROBLEM — N87.0 DYSPLASIA OF CERVIX, LOW GRADE (CIN 1): Status: ACTIVE | Noted: 2021-07-08

## 2024-01-03 NOTE — TELEPHONE ENCOUNTER
Left message for patient to return my call to [phone]  Dean Pierce, DASHAN, RN   Pap Tracking Nurse

## 2024-01-08 ENCOUNTER — HOSPITAL ENCOUNTER (OUTPATIENT)
Dept: ULTRASOUND IMAGING | Facility: CLINIC | Age: 54
Discharge: HOME | End: 2024-01-08
Attending: OBSTETRICS & GYNECOLOGY | Admitting: OBSTETRICS & GYNECOLOGY
Payer: COMMERCIAL

## 2024-01-08 DIAGNOSIS — Z30.430 ENCOUNTER FOR INSERTION OF INTRAUTERINE CONTRACEPTIVE DEVICE: ICD-10-CM

## 2024-01-08 PROCEDURE — 76856 US EXAM PELVIC COMPLETE: CPT

## 2024-01-08 NOTE — TELEPHONE ENCOUNTER
1/8/24 Have been unable to reach pt. Dr Arriaza did tell pt on visit on 12/7/23 that he would like pt to come back in 3 months so she is aware of plan. Pap tracking is updated and reminder will be sent in Feb of 2024.

## 2024-01-22 ENCOUNTER — TELEPHONE (OUTPATIENT)
Dept: INTERNAL MEDICINE | Facility: CLINIC | Age: 54
End: 2024-01-22
Payer: COMMERCIAL

## 2024-01-22 DIAGNOSIS — E78.5 HYPERLIPIDEMIA LDL GOAL <100: ICD-10-CM

## 2024-01-22 NOTE — TELEPHONE ENCOUNTER
Pt called the internal medicine clinic for US results. Given per [employer] message sent to patient:     Hi your test result is normal  Dr. Melva Teixeira DO    Obstetrics and Gynecology  [facility] - [place] and [place]   Written by Melav Teixeira DO on 1/11/2024  4:33 PM CST    Pt reported having spotting with red blood since January 5th that looks like a very light period. No pain and no urinary burning or pain. Pt is asking what needs to be done. Will route this question to OBGYN. Please call patient to advise on further action.

## 2024-01-22 NOTE — TELEPHONE ENCOUNTER
Spoke with pt.     Should had a mirena IUD inserted on 12/7/23.    Recommended that she continue to monitor and let us know if anything changes.     Maria Esther RODRIGUEZ RN

## 2024-01-23 RX ORDER — ATORVASTATIN CALCIUM 10 MG/1
10 TABLET, FILM COATED ORAL DAILY
Qty: 90 TABLET | Refills: 3 | Status: SHIPPED | OUTPATIENT
Start: 2024-01-23

## 2024-01-29 DIAGNOSIS — E11.9 TYPE 2 DIABETES MELLITUS WITHOUT COMPLICATION, WITHOUT LONG-TERM CURRENT USE OF INSULIN (H): ICD-10-CM

## 2024-02-29 ENCOUNTER — OFFICE VISIT (OUTPATIENT)
Dept: INTERNAL MEDICINE | Facility: CLINIC | Age: 54
End: 2024-02-29
Payer: COMMERCIAL

## 2024-02-29 VITALS
DIASTOLIC BLOOD PRESSURE: 72 MMHG | BODY MASS INDEX: 23.13 KG/M2 | RESPIRATION RATE: 13 BRPM | OXYGEN SATURATION: 100 % | WEIGHT: 130.6 LBS | SYSTOLIC BLOOD PRESSURE: 124 MMHG | HEART RATE: 92 BPM | TEMPERATURE: 98.2 F

## 2024-02-29 DIAGNOSIS — E11.9 TYPE 2 DIABETES MELLITUS WITHOUT COMPLICATION, WITHOUT LONG-TERM CURRENT USE OF INSULIN (H): Primary | ICD-10-CM

## 2024-02-29 DIAGNOSIS — I10 HYPERTENSION, UNSPECIFIED TYPE: ICD-10-CM

## 2024-02-29 DIAGNOSIS — Z12.4 CERVICAL CANCER SCREENING: ICD-10-CM

## 2024-02-29 LAB
ERYTHROCYTE [DISTWIDTH] IN BLOOD BY AUTOMATED COUNT: 12 % (ref 10–15)
HBA1C MFR BLD: 9.2 % (ref 0–5.6)
HCT VFR BLD AUTO: 38.6 % (ref 35–47)
HGB BLD-MCNC: 13.4 G/DL (ref 11.7–15.7)
MCH RBC QN AUTO: 30.3 PG (ref 26.5–33)
MCHC RBC AUTO-ENTMCNC: 34.7 G/DL (ref 31.5–36.5)
MCV RBC AUTO: 87 FL (ref 78–100)
PLATELET # BLD AUTO: 333 10E3/UL (ref 150–450)
RBC # BLD AUTO: 4.42 10E6/UL (ref 3.8–5.2)
WBC # BLD AUTO: 6.7 10E3/UL (ref 4–11)

## 2024-02-29 PROCEDURE — 83036 HEMOGLOBIN GLYCOSYLATED A1C: CPT | Performed by: INTERNAL MEDICINE

## 2024-02-29 PROCEDURE — 36415 COLL VENOUS BLD VENIPUNCTURE: CPT | Performed by: INTERNAL MEDICINE

## 2024-02-29 PROCEDURE — 85027 COMPLETE CBC AUTOMATED: CPT | Performed by: INTERNAL MEDICINE

## 2024-02-29 PROCEDURE — 99214 OFFICE O/P EST MOD 30 MIN: CPT | Performed by: INTERNAL MEDICINE

## 2024-02-29 RX ORDER — GLIMEPIRIDE 4 MG/1
4 TABLET ORAL
Qty: 90 TABLET | Refills: 1 | Status: SHIPPED | OUTPATIENT
Start: 2024-02-29 | End: 2024-08-21

## 2024-02-29 NOTE — PROGRESS NOTES
Assessment & Plan     Type 2 diabetes mellitus without complication, without long-term current use of insulin (H)  Discussed benefit of rechecking A1c level and if A1c level remains as elevated patient needs additional medical management.  We discussed the newer oral meds and injectable meds available.  She is not quite sure she wants to go that route pending her blood work.  - Hemoglobin A1c; Future  - glimepiride (AMARYL) 4 MG tablet; Take 1 tablet (4 mg) by mouth every morning (before breakfast)  - CBC with platelets; Future    Hypertension, unspecified type  Stable on therapy and continue with current medical manage    Cervical cancer screening  Advised patient to consider following up for routine gynecological exam.    See Patient Instructions    Subjective   Theresa is a 54 year old, presenting for the following health issues:  Diabetes    HPI       Diabetes Follow-up    Theresa is here to follow-up on her blood sugars.  She reports she has been compliant with therapy.  She states that she does not routinely check her blood sugars at home.  She informs me that needs a refill on one of her medications.    We reviewed her prior colonoscopy and mammogram.  We discussed the fact that she is due for a Pap smear and she is to see Dr. Arriaza in the gynecology department but will be scheduling a follow-up appointment with a new provider since his jail.    We discussed and reviewed the need for updated vaccinations as scheduled.    How often are you checking your blood sugar? One time daily  What time of day are you checking your blood sugars (select all that apply)?  Before meals  Have you had any blood sugars above 200?  No  Have you had any blood sugars below 70?  No  What symptoms do you notice when your blood sugar is low?  None  What concerns do you have today about your diabetes? None   Do you have any of these symptoms? (Select all that apply)  Numbness in feet      BP Readings from Last 2 Encounters:    02/29/24 124/72   12/07/23 110/66     Hemoglobin A1C (%)   Date Value   09/07/2023 10.4 (H)   11/22/2022 7.7 (H)   06/14/2021 7.7 (H)   09/05/2020 7.7 (H)     LDL Cholesterol Calculated (mg/dL)   Date Value   09/07/2023 93   06/03/2022 54   06/14/2021 69   09/07/2019 65             Review of Systems  CONSTITUTIONAL: NEGATIVE for fever, chills, change in weight  EYES: NEGATIVE for vision changes or irritation  ENT/MOUTH: NEGATIVE for ear, mouth and throat problems  RESP: NEGATIVE for significant cough or SOB  CV: NEGATIVE for chest pain, palpitations or peripheral edema  GI: NEGATIVE for nausea, abdominal pain, heartburn, or change in bowel habits  : NEGATIVE for frequency, dysuria, or hematuria  MUSCULOSKELETAL: NEGATIVE for significant arthralgias or myalgia  NEURO: NEGATIVE for weakness, dizziness or paresthesias  ENDOCRINE: NEGATIVE for temperature intolerance, skin/hair changes  HEME: NEGATIVE for bleeding problems  PSYCHIATRIC: NEGATIVE for changes in mood or affect    Current Outpatient Medications   Medication    ACE/ARB/ARNI NOT PRESCRIBED (INTENTIONAL)    acetaminophen (TYLENOL) 325 MG tablet    amLODIPine (NORVASC) 5 MG tablet    atorvastatin (LIPITOR) 10 MG tablet    blood glucose monitoring (ACCU-CHEK BECKY PLUS) meter device kit    carboxymethylcellulose PF (REFRESH PLUS) 0.5 % ophthalmic solution    CINNAMON PO    glimepiride (AMARYL) 4 MG tablet    ibuprofen (ADVIL/MOTRIN) 200 MG tablet    levonorgestrel (MIRENA) 52 MG (20 mcg/day) IUD    metFORMIN (GLUCOPHAGE) 500 MG tablet    MULTIVITAMINS PO TABS    RESTASIS 0.05 % ophthalmic emulsion     No current facility-administered medications for this visit.           Objective    /72   Pulse 92   Temp 98.2  F (36.8  C) (Temporal)   Resp 13   Wt 59.2 kg (130 lb 9.6 oz)   SpO2 100%   BMI 23.13 kg/m    Body mass index is 23.13 kg/m .  Physical Exam   GENERAL: alert and no distress  EYES: Eyes grossly normal to inspection, PERRL and conjunctivae  and sclerae normal  HENT: ear canals and TM's normal, nose and mouth without ulcers or lesions  NECK: no adenopathy, no asymmetry, masses, or scars  RESP: lungs clear to auscultation - no rales, rhonchi or wheezes  CV: regular rate and rhythm, normal S1 S2, no S3 or S4, no murmur, click or rub, no peripheral edema  MS: no gross musculoskeletal defects noted, no edema  NEURO: No focal changes  PSYCH: mentation appears normal, affect normal/bright        Lab Results   Component Value Date    A1C 10.4 09/07/2023    A1C 7.7 11/22/2022    A1C 8.0 06/03/2022    A1C 8.0 01/06/2022    A1C 7.7 06/14/2021    A1C 7.7 09/05/2020    A1C 9.2 05/09/2020    A1C 9.0 11/04/2019    A1C 7.8 09/07/2019       LDL Cholesterol Calculated   Date Value Ref Range Status   09/07/2023 93 <=100 mg/dL Final   06/14/2021 69 <100 mg/dL Final     Comment:     Desirable:       <100 mg/dl     Creatinine   Date Value Ref Range Status   09/07/2023 0.51 0.51 - 0.95 mg/dL Final   06/14/2021 0.55 0.52 - 1.04 mg/dL Final         Signed Electronically by: Arsenio Smith MD

## 2024-02-29 NOTE — LETTER
March 5, 2024      Theresa Quarles  [address] [place] [id number]        Dear ,    We are writing to inform you of your test results.    I am pleased to inform you that your white blood cell count, hemoglobin and platelet counts have returned normal.     Your hemoglobin A1c is slightly better than when was checked 5 months ago.  Your current value of 9.2 is compared to that of 10.4 when last checked.  Although better, your A1c is still higher than I would like to see it and indicates that she would be a candidate for additional medical therapy as we discussed.  These medications include options that are injectable or oral such as Jardiance, Invokana or Farxiga.     If you are interested in 1 of these oral medicines you should check with your insurance to make sure they are covered.  If not please work more aggressively on your diet and exercise and we should recheck your values again in 3 months for comparison to see if they have improved.    Resulted Orders   Hemoglobin A1c   Result Value Ref Range    Hemoglobin A1C 9.2 (H) 0.0 - 5.6 %      Comment:      Normal <5.7%   Prediabetes 5.7-6.4%    Diabetes 6.5% or higher     Note: Adopted from ADA consensus guidelines.    Narrative    Results consistent with previous, repeat testing unnecessary     CBC with platelets   Result Value Ref Range    WBC Count 6.7 4.0 - 11.0 10e3/uL    RBC Count 4.42 3.80 - 5.20 10e6/uL    Hemoglobin 13.4 11.7 - 15.7 g/dL    Hematocrit 38.6 35.0 - 47.0 %    MCV 87 78 - 100 fL    MCH 30.3 26.5 - 33.0 pg    MCHC 34.7 31.5 - 36.5 g/dL    RDW 12.0 10.0 - 15.0 %    Platelet Count 333 150 - 450 10e3/uL       If you have any questions or concerns, please call the clinic at the number listed above.       Sincerely,      Arsenio Smith MD

## 2024-03-29 ENCOUNTER — PATIENT OUTREACH (OUTPATIENT)
Dept: OBGYN | Facility: CLINIC | Age: 54
End: 2024-03-29
Payer: COMMERCIAL

## 2024-03-29 NOTE — LETTER
March 29, 2024      Theresa SARY Robina  [address] [place] [id number]        Dear ,    This letter is to remind you that you are due for your follow-up Pap smear and Human Papillomavirus (HPV) test.    Please call [phone] to schedule your appointment at your earliest convenience.    If you have completed the appointment outside of the [facility] system, please have the records forwarded to our office. We will update your chart for your provider to review before your next annual wellness visit.     Thank you for choosing [facility]!      Sincerely,    Your [facility] Care Team

## 2024-06-04 NOTE — TELEPHONE ENCOUNTER
FYI to provider - Patient is lost to pap tracking follow-up. Attempts to contact pt have been made per reminder process and there has been no reply and/or no appt scheduled. Contact hx listed below.     3/14/2003 NIL pap  4/9/2004 NIL pap  4/25/2005 NIL pap  5/3/2006 NIL pap  12/27/2007 NIL pap  6/18/2009 NIL pap  6/24/2010 NIL pap  8/4/2011 NIL pap  8/30/2012 NIL pap  9/26/2013 NIL pap  10/24/2016 NIL pap, Neg HPV  11/23/18 NIL pap, + HR HPV (not 16/18). Plan 1 year cotest  4/16/20 NIL pap, neg HR HPV. Plan 3 year cotest  5/13/21 NIL pap, + HR HPV 16. Plan colp bef 8/13/21 7/8/21 [place] Bx & ECC: LOGAN 1. Plan follow up visit to discuss  7/16/21 Follow up visit: Plan per provider: Cotest in 6 months (due 1/8/22)   3/31/22 ASCUS pap, neg HPV. Per provider, plan colp due before 6/30/22.  9/22/22 COLP- Atypia. ECC- negative for dysplasia. NIL pap, HPV processing. Plan  repeat a pap with cotesting in 6 months and if that is normal then go back to yearly pap and cotesting.  If abnl consider repeat colp per provider .   9/28/22 LM for pt to call me back. Pt sent results via [employer]. Seen by patient Theresa Quarles on 9/29/2022 12:25 PM  9/29/22 LM for pt to call me back.  10/3 /22 Results of colp sent via [employer].  10/7/22 LM for pt to check mychart or call me back  10/12/22 LM for pt to call me back or check mychart  10/14/22 Result letter mailed with result and recommendation from colp. Unable to reach pt by phone and she has not checked her mychart.   3/8/23 Reminder letter  4/6/23 Reminder call- LM  5/5/23 Reminder pushed out to one year aliya  11/24/23 Appt with Dr Arriaza- note said cotest would be done at next visit.  12/7/23 Appt with Dr Arriaza. Cotest not done/  12/20/23 Note sent to Dr Arriaza. New plan . Repeat cotest 3/7/24. Pap tracking updated   12/21/23 LM for pt to call me back   12/27/23 LM for pt to call me back  1/3/24 LM  for pt to call me back   1/8/24 Have been unable to reach pt. Dr Arriaza did tell pt on  visit on 12/7/23 that he would like pt to come back in 3 months so she is aware of plan. Pap tracking is updated and reminder will be sent in Feb of 2024.  2/29/24 Appt- pap not done. Provider advised pt to make an appt with OB/GYN for follow up  3/29/24 Reminder letter  4/29/24 Reminder call-LM  6/4/24 Lost to follow up

## 2024-08-08 ENCOUNTER — PATIENT OUTREACH (OUTPATIENT)
Dept: CARE COORDINATION | Facility: CLINIC | Age: 54
End: 2024-08-08
Payer: COMMERCIAL

## 2024-08-21 DIAGNOSIS — E11.9 TYPE 2 DIABETES MELLITUS WITHOUT COMPLICATION, WITHOUT LONG-TERM CURRENT USE OF INSULIN (H): ICD-10-CM

## 2024-08-21 RX ORDER — GLIMEPIRIDE 4 MG/1
TABLET ORAL
Qty: 90 TABLET | Refills: 1 | Status: SHIPPED | OUTPATIENT
Start: 2024-08-21

## 2024-08-22 ENCOUNTER — PATIENT OUTREACH (OUTPATIENT)
Dept: CARE COORDINATION | Facility: CLINIC | Age: 54
End: 2024-08-22
Payer: COMMERCIAL

## 2024-08-24 NOTE — TELEPHONE ENCOUNTER
Bleeding has mostly stopped, spotting when wiping.     Dizziness improving.    Katia PATTON RN BSN         complains of pain/discomfort

## 2024-08-31 ENCOUNTER — HEALTH MAINTENANCE LETTER (OUTPATIENT)
Age: 54
End: 2024-08-31

## 2024-09-17 NOTE — PROCEDURES
Call to pt and let her know that Arsalan placed f/up US order and to call asap to schedule at [phone].    CATALINO Peterson   Pain mgmt  Pt/ot   Pt having increase pain/discomfort   tramadol prn

## 2024-10-07 ENCOUNTER — OFFICE VISIT (OUTPATIENT)
Dept: OBGYN | Facility: CLINIC | Age: 54
End: 2024-10-07
Payer: COMMERCIAL

## 2024-10-07 VITALS — DIASTOLIC BLOOD PRESSURE: 82 MMHG | BODY MASS INDEX: 22.98 KG/M2 | WEIGHT: 129.7 LBS | SYSTOLIC BLOOD PRESSURE: 136 MMHG

## 2024-10-07 DIAGNOSIS — N87.0 DYSPLASIA OF CERVIX, LOW GRADE (CIN 1): ICD-10-CM

## 2024-10-07 DIAGNOSIS — Z11.3 SCREENING FOR STD (SEXUALLY TRANSMITTED DISEASE): ICD-10-CM

## 2024-10-07 DIAGNOSIS — R87.810 CERVICAL HIGH RISK HPV (HUMAN PAPILLOMAVIRUS) TEST POSITIVE: ICD-10-CM

## 2024-10-07 DIAGNOSIS — Z01.419 WOMEN'S ANNUAL ROUTINE GYNECOLOGICAL EXAMINATION: Primary | ICD-10-CM

## 2024-10-07 DIAGNOSIS — L90.0 LICHEN SCLEROSUS: ICD-10-CM

## 2024-10-07 LAB
C TRACH DNA SPEC QL NAA+PROBE: NEGATIVE
N GONORRHOEA DNA SPEC QL NAA+PROBE: NEGATIVE

## 2024-10-07 PROCEDURE — 87491 CHLMYD TRACH DNA AMP PROBE: CPT | Performed by: STUDENT IN AN ORGANIZED HEALTH CARE EDUCATION/TRAINING PROGRAM

## 2024-10-07 PROCEDURE — 99213 OFFICE O/P EST LOW 20 MIN: CPT | Mod: 25 | Performed by: STUDENT IN AN ORGANIZED HEALTH CARE EDUCATION/TRAINING PROGRAM

## 2024-10-07 PROCEDURE — 87624 HPV HI-RISK TYP POOLED RSLT: CPT | Performed by: STUDENT IN AN ORGANIZED HEALTH CARE EDUCATION/TRAINING PROGRAM

## 2024-10-07 PROCEDURE — 88175 CYTOPATH C/V AUTO FLUID REDO: CPT | Performed by: STUDENT IN AN ORGANIZED HEALTH CARE EDUCATION/TRAINING PROGRAM

## 2024-10-07 PROCEDURE — 99396 PREV VISIT EST AGE 40-64: CPT | Performed by: STUDENT IN AN ORGANIZED HEALTH CARE EDUCATION/TRAINING PROGRAM

## 2024-10-07 PROCEDURE — 87591 N.GONORRHOEAE DNA AMP PROB: CPT | Performed by: STUDENT IN AN ORGANIZED HEALTH CARE EDUCATION/TRAINING PROGRAM

## 2024-10-07 RX ORDER — CLOBETASOL PROPIONATE 0.5 MG/G
OINTMENT TOPICAL
Qty: 60 G | Refills: 1 | Status: SHIPPED | OUTPATIENT
Start: 2024-10-07 | End: 2024-10-07

## 2024-10-07 RX ORDER — CLOBETASOL PROPIONATE 0.5 MG/G
OINTMENT TOPICAL
Qty: 60 G | Refills: 1 | Status: SHIPPED | OUTPATIENT
Start: 2024-10-07

## 2024-10-07 NOTE — PROGRESS NOTES
[facility]  Annual Health Maintenance Visit  Date: 10/07/2024    CC: Annual exam    HPI:  Theresa Quarles is a 54 year old  who is here for annual GYN exam.      Concerns today:  - No major concerns today.    - Has intense vulvar itching for the few days leading up to menses. No bleeding today.    Preventative Health Assessment:  Tobacco use: No  Alcohol use: No  Drug use: No  Abuse: Denies physical, sexual, or verbal abuse. Feels safe in current relationship  Depression screening: Negative    Ob/Gyn History:    Patient's last menstrual period was 2024 (exact date)..   Menses: Mirena in place. Menses are now monthly (before there were sporadic). They are light. Last 4 days. 1 day of regular bleeding then 3 days of very light spotting. Very happy with this.   Dysmenorrhea: No painful menses  Sexually Active: Yes  Sexual Partner: 1 male partner(spouse)  Dyspareunia: No  Contraception: Mirena  Discharge: Itching before periods  Last pap smear: . Result: HPV OHR+ NILM  Any history of abnormal pap: Yes  History of STDs: No  Incontinence: No  Menopausal symptoms: No  Hormonal therapy: No    Health Maintenance:   Last pap smear: See above  Last mammograms: . Result: Birads 1  Last colonoscopy: . Result: Repeat in 10 years    Immunizations:    Most Recent Immunizations   Administered Date(s) Administered    COVID-19 12+ (Pfizer) 2024    COVID-19 Bivalent 12+ (Pfizer) 2022    COVID-19 MONOVALENT 12+ (Pfizer) 2022    COVID-19 Monovalent 12+ (Pfizer ) 2022    HEPA 2012    HepB 2017    Hepatitis A (ADULT 19+) 2016    Influenza (IIV3) PF 10/13/2012    Influenza Vaccine 18-64 (Flublok) 10/29/2018, 10/29/2018    Influenza Vaccine >6 months,quad, PF 10/07/2023    Influenza,INJ,MDCK,PF,Quad >6mo(Flucelvax) 10/22/2022    Pneumo Conj 13-V (2010&after) 04/10/2017    TDAP Vaccine (Boostrix) 2009    Typhoid IM 2012         PMH:    Past  Medical History:   Diagnosis Date    Abnormal Pap smear of cervix 03/31/2022    Cervical high risk HPV (human papillomavirus) test positive 11/23/2018 5/13/21    Fatty liver 05/21/2012    GBS (group B streptococcus) infection 09/04/2008    gestational diabetes     Gestational diabetes mellitus, antepartum 03/09/2009    H/O colposcopy with cervical biopsy 07/08/2021    History of blood transfusion     Hyperlipidemia LDL goal < 130 07/13/2010    Hypertension     Liver function test abnormality 07/13/2010    Pregnancies, high-risk 03/19/2009       Prob List:    Patient Active Problem List   Diagnosis    Liver function test abnormality    Hyperlipidemia LDL goal <100    Butler's metatarsalgia, neuralgia, or neuroma    Foot joint pain    Abnormality of gait    Other peripheral enthesopathies    Fatty liver    Pyelonephritis    Type 2 diabetes mellitus without complication, without long-term current use of insulin (H)    Cervical high risk HPV (human papillomavirus) test positive    Dysplasia of cervix, low grade (LOGAN 1)    Abnormal uterine bleeding    Anemia due to blood loss, acute       PSH:    Past Surgical History:   Procedure Laterality Date    BIOPSY BREAST  2012    needle bx benign    DILATION AND CURETTAGE, OPERATIVE HYSTEROSCOPY WITH MORCELLATOR, COMBINED N/A 5/30/2023    Procedure: exam under anesthesia, hysteroscopy with the myosure device and polypectomy, fractional dilation and curettage;  Surgeon: Kraig Arriaza MD;  Location: [facility] OR     REMOVAL OF TONSILS,12+ Y/O  2001    Tonsils 14+y.o.    [facility] EXCISE HAND/FOOT NEUROMA         Allergies:    Allergies   Allergen Reactions    Lisinopril Hives     Noted angioedema       Medications:    Current Outpatient Medications   Medication Sig Dispense Refill    ACE/ARB/ARNI NOT PRESCRIBED (INTENTIONAL) Please choose reason not prescribed from choices below.      acetaminophen (TYLENOL) 325 MG tablet Take 2 tablets (650 mg) by mouth every 4 hours as needed for  mild pain 50 tablet 0    amLODIPine (NORVASC) 5 MG tablet Take 1 tablet (5 mg) by mouth daily 90 tablet 3    atorvastatin (LIPITOR) 10 MG tablet Take 1 tablet (10 mg) by mouth daily 90 tablet 3    carboxymethylcellulose PF (REFRESH PLUS) 0.5 % ophthalmic solution Place 1 drop into both eyes 3 times daily as needed for dry eyes      CINNAMON PO Take 1 tablet by mouth daily      glimepiride (AMARYL) 4 MG tablet TAKE 1 TABLET EVERY MORNINGBEFORE BREAKFAST 90 tablet 1    ibuprofen (ADVIL/MOTRIN) 200 MG tablet Take 2 tablets (400 mg) by mouth every 4 hours as needed for other, moderate pain or mild pain (mild and/or inflammatory pain)      levonorgestrel (MIRENA) 52 MG (20 mcg/day) IUD 1 each by Intrauterine route once      metFORMIN (GLUCOPHAGE) 500 MG tablet Take 2 tablets (1,000 mg) by mouth 2 times daily (with meals) 360 tablet 3    MULTIVITAMINS PO TABS ONE DAILY 100 Tab 3    RESTASIS 0.05 % ophthalmic emulsion Place 1 drop into both eyes 2 times daily      blood glucose monitoring (ACCU-CHEK BECKY PLUS) meter device kit Use to test blood sugars 3 times daily or as directed with appropriate meter test strips and lancets and ETOH swabs, # 1 month, refill times 11 (Patient not taking: Reported on 10/7/2024) 1 kit 5     No current facility-administered medications for this visit.       FH:    Family History   Problem Relation Age of Onset    Thyroid Disease Mother         s/p thyrodectomy    Cancer - colorectal Mother         rectal cancer    Cerebrovascular Disease Father             Appendicitis Daughter        SH:    Social History     Tobacco Use    Smoking status: Never    Smokeless tobacco: Never   Vaping Use    Vaping status: Never Used   Substance Use Topics    Alcohol use: No    Drug use: No       History   Smoking Status    Never   Smokeless Tobacco    Never       Review of systems:  With the exception of any items noted above, the 10 point ROS was negative.    O:  /82 (BP Location: Left arm,  Cuff Size: Adult Regular)   Wt 58.8 kg (129 lb 11.2 oz)   LMP 2024 (Exact Date)   BMI 22.98 kg/m    GEN: Alert, oriented x3, NAD  HEENT: Atraumatic/normocephalic, pupils mid-sized, MMM  NECK: Normal ROM  CV: Well perfused  PULM: Normal work of breathing  BREAST: Bilaterally without focal masses, lesions, dimpling, rash; no axillary lymphadenopathy. Exam chaperoned by Lynette Lai MA  ABD: Soft, non-distended, and non-tender without guarding or rebound  : White/gray pigment to labia surrounding vagina. Not present around rectum. Otherwise NEFG. Vagina is without lesions. Physiologic discharge seen. Cervix normal appearing, IUD strings seen. On bimanual the uterus is small anteverted, mobile. No cervical motion tenderness. No adnexal masses or tenderness. Pap smear collected. Exam chaperoned by Lynette Lai MA.  EXT: Normal extremities, grossly normal ROM  NEURO: Speech clear, CNs grossly intact, moves all extremities appropriately  PSYCH: Appropriate affect    A/P:  Theresa Quarles is a 54 year old  who presents for annual exam.   Age appropriate counseling.  Other issues addressed today as below.    #Screening  - GC/CT: Collected  - Cervical cancer. Pap smear/cotest collected (Hx HPV OHR+)  - Clinical breast exam: Unremarkable  - Mammogram: Ordered  - Colorectal cancer: Current    #Immunizations  - Current with flu and COVID. Flu was done in 2024 as well but not in system.    #Contraception  - Mirena    #Lichen Sclerosis  - Reviewed diagnosis, course, and treatment of lichen sclerosis  - Rx for Clobetasol: Nightly x4 weeks > every other night x 4 weeks > twice weekly  - Symptomatic treatment for vulvar irritation with soak and seal: hot bath or hot compress to vulva for 20 minutes followed by application of vaseline to any areas of irritation or itching. Can repeat one or both of these steps PRN throughout the day.  - Follow-up in 6 weeks    Follow up in 6 weeks, sooner if questions or  concerns.    Asif Marcial MD MPH  [facility]  10/07/2024 8:51 AM

## 2024-10-07 NOTE — NURSING NOTE
"Chief Complaint   Patient presents with    Gyn Exam     Pelvic & breast exam  Pap Due  22  NIL  POS HPV HR Other   Mammo due: 25   Mirena IUD  23        Initial /82 (BP Location: Left arm, Cuff Size: Adult Regular)   Wt 58.8 kg (129 lb 11.2 oz)   LMP 2024 (Exact Date)   BMI 22.98 kg/m   Estimated body mass index is 22.98 kg/m  as calculated from the following:    Height as of 23: 1.6 m (5' 3\").    Weight as of this encounter: 58.8 kg (129 lb 11.2 oz).  BP completed using cuff size: regular    Questioned patient about current smoking habits.  Pt. has never smoked.          The following HM Due: pap smear      Bettie Cagle, JUDAH on 10/7/2024 at 8:31 AM    "

## 2024-10-07 NOTE — PATIENT INSTRUCTIONS
Lichen Sclerosis    - Lichen sclerosis is a chronic condition that occurs most often in postmenopausal women, usually involving itching, burning, and pain at the vulva. It likely occurs in 1 in 50 post-menopausal women. It is a benign, chronic, progressive skin condition characterized by inflammation, skin thinning, and distinctive change in the skin architecture of the vulva. The course usually includes frequent recurrences and remissions of signs and symptoms.  - Although the risk of vulvar squamous cell carcinoma in women with vulvar Lichen Sclerosis is increased, this risk is estimated to be less than 5 percent. The vulva should be examined at least yearly for the remainder of your life and nonresolving lesions of localized thickened skin should be biopsied.  - The goals of treatment is resolution of the symptoms (itching and pain) and signs of disease, including thickened skin, fissuring, and bruising.   - Topical steroid ointment treatment is a highly effective therapy that can prevent progression of the disease. It is important to avoid scratching the area to prevent further progression.  - Initially, we will try clobetasol ointment or cream nightly for 4 weeks then every other night for four weeks, then twice weekly for four weeks, using 1/2 fingertip unit to apply a thin layer to all affected areas. Do not apply ointment to any areas that have hair. Do no put any ointment into your vagina.  - Most patients will need to continue the clobetasol twice weekly on an ongoing basis to prevent worsening of symptoms. Ongoing use also decreases your risk of the Lichen Sclerosus evolving into skin cancer, which is very rare.    - You should examine the vulvar area with a mirror and fingertips on a monthly basis and should return for evaluation if you see thickened areas of skin or sores that do not heal  - If you ever develop worsening symptoms increase the use of the ointment again to day for 7-10 days, then decrease  "to twice a week again. If you have worsening symptoms that do not respond to a burst of treatment as described, please return for evaluation.  - For symptomatic treatment of vulvar itching I recommend \"soak and seal\": hot bath or hot compress to vulva for 20 minutes followed by application of vaseline to any areas of irritation or itching. Can repeat one or both of these steps as needed throughout the day.  - Return in 6 weeks  "

## 2024-10-08 LAB
HPV HR 12 DNA CVX QL NAA+PROBE: NEGATIVE
HPV16 DNA CVX QL NAA+PROBE: NEGATIVE
HPV18 DNA CVX QL NAA+PROBE: NEGATIVE
HUMAN PAPILLOMA VIRUS FINAL DIAGNOSIS: NORMAL

## 2024-10-10 LAB
BKR AP ASSOCIATED HPV REPORT: NORMAL
BKR LAB AP GYN ADEQUACY: NORMAL
BKR LAB AP GYN INTERPRETATION: NORMAL
BKR LAB AP LMP: NORMAL
BKR LAB AP PREVIOUS ABNL DX: NORMAL
BKR LAB AP PREVIOUS ABNORMAL: NORMAL
PATH REPORT.COMMENTS IMP SPEC: NORMAL
PATH REPORT.COMMENTS IMP SPEC: NORMAL
PATH REPORT.RELEVANT HX SPEC: NORMAL

## 2024-11-08 ENCOUNTER — TRANSFERRED RECORDS (OUTPATIENT)
Dept: MULTI SPECIALTY CLINIC | Facility: CLINIC | Age: 54
End: 2024-11-08

## 2024-11-08 LAB — RETINOPATHY: NORMAL

## 2024-12-09 ENCOUNTER — OFFICE VISIT (OUTPATIENT)
Dept: OBGYN | Facility: CLINIC | Age: 54
End: 2024-12-09
Payer: COMMERCIAL

## 2024-12-09 VITALS — SYSTOLIC BLOOD PRESSURE: 144 MMHG | WEIGHT: 128 LBS | DIASTOLIC BLOOD PRESSURE: 100 MMHG | BODY MASS INDEX: 22.67 KG/M2

## 2024-12-09 DIAGNOSIS — B37.31 CANDIDIASIS OF VULVA AND VAGINA: ICD-10-CM

## 2024-12-09 DIAGNOSIS — L90.0 LICHEN SCLEROSUS: Primary | ICD-10-CM

## 2024-12-09 DIAGNOSIS — N89.8 VAGINAL ITCHING: ICD-10-CM

## 2024-12-09 LAB
BACTERIAL VAGINOSIS VAG-IMP: NEGATIVE
CANDIDA DNA VAG QL NAA+PROBE: DETECTED
CANDIDA GLABRATA / CANDIDA KRUSEI DNA: NOT DETECTED
T VAGINALIS DNA VAG QL NAA+PROBE: NOT DETECTED

## 2024-12-09 PROCEDURE — G2211 COMPLEX E/M VISIT ADD ON: HCPCS | Performed by: STUDENT IN AN ORGANIZED HEALTH CARE EDUCATION/TRAINING PROGRAM

## 2024-12-09 PROCEDURE — 99459 PELVIC EXAMINATION: CPT | Performed by: STUDENT IN AN ORGANIZED HEALTH CARE EDUCATION/TRAINING PROGRAM

## 2024-12-09 PROCEDURE — 99213 OFFICE O/P EST LOW 20 MIN: CPT | Performed by: STUDENT IN AN ORGANIZED HEALTH CARE EDUCATION/TRAINING PROGRAM

## 2024-12-09 PROCEDURE — 0352U MULTIPLEX VAGINAL PANEL BY PCR: CPT | Performed by: STUDENT IN AN ORGANIZED HEALTH CARE EDUCATION/TRAINING PROGRAM

## 2024-12-09 NOTE — PROGRESS NOTES
"[facility]  Gynecology Office Visit    CC: Lichen Sclerosus f/up    S:  Theresa Quarles is a 54 year old  who presents for the above. She is here alone.   See my notes from 10/7 for details.    - Last visit was rx'ed clobetasol. She has used it for 2 periods but has since stopped as it \"made things worse.\" On further discussion she was not using clobetasol as prescribed. Was using it twice a day. She first tried it for a week BID then stopped. Then again tried for 3 days and stopped. In addition to making vulva more itchy and dry and sore it also caused a rash however she admits she never looked at her vulva to confirm this.   - Also reports that prior to visit on 10/7 she was using cortisone 10 (1% Hydrocortisone ointment - see patient message from 12/10) for itching. She was using this daily. Even when not having itching. When she stopped the Clobetasol she went back to this.   - Has been using Vaseline and this has been helpful  - No itching currently; last was   - As before really only has itching around time of menses, which is now just spotting.    O:  BP (!) 144/100   Wt 58.1 kg (128 lb)   LMP 2024 (Exact Date)   BMI 22.67 kg/m      Exam:  GEN: Appears well, NAD  : Mildly atrophic external genitalia. Thin layer of white discharge on vulva. Labia majora tissue appears thinned and has reddish-white hue; less so on labia minora. Exam chaperoned by Norm Malik LPN. Pictures below were taken in EPIC with patient's permission.                      A/P:  Theresa Quarles is a 54 year old  who presents for f/up of vaginal itching and presumed lichen sclerosus.    #Vaginal Itching  #?Lichen Sclerosus  - On exam appears more consistent with inflammation rather than lichen sclerosus; possibly due to longstanding steroid ointment.  - Multiplex swab  - Recommend discontinuation of hydrocortisone, and only using PRN (up to BID) when she has itching  - Continue Vaseline and soaks  - " May need Rx to help with itching  - Follow-up in 2-3 months. If worsening would consider biopsy    Asif Marcial MD MPH  [facility]  12/09/2024 9:07 AM    =======================================================  Addendum    #Yeast Infection  - Multiplex with Candida  - Rx for Diflucan x3    Asif Marcial MD MPH  [facility]  12/10/2024 8:50 PM

## 2024-12-09 NOTE — NURSING NOTE
"Chief Complaint   Patient presents with    Follow Up     lichen       Initial BP (!) 144/100   Wt 58.1 kg (128 lb)   LMP 2024 (Exact Date)   BMI 22.67 kg/m   Estimated body mass index is 22.67 kg/m  as calculated from the following:    Height as of 23: 1.6 m (5' 3\").    Weight as of this encounter: 58.1 kg (128 lb).  BP completed using cuff size: regular    Questioned patient about current smoking habits.  Pt. has never smoked.          The following HM Due: NONE  Carmen Malik LPN on 2024 at 8:59 AM        "

## 2024-12-10 RX ORDER — FLUCONAZOLE 150 MG/1
150 TABLET ORAL
Qty: 3 TABLET | Refills: 0 | Status: SHIPPED | OUTPATIENT
Start: 2024-12-10 | End: 2024-12-17

## 2025-01-05 DIAGNOSIS — E78.5 HYPERLIPIDEMIA LDL GOAL <100: ICD-10-CM

## 2025-01-06 RX ORDER — ATORVASTATIN CALCIUM 10 MG/1
10 TABLET, FILM COATED ORAL DAILY
Qty: 90 TABLET | Refills: 3 | Status: SHIPPED | OUTPATIENT
Start: 2025-01-06

## 2025-01-08 ENCOUNTER — ANCILLARY PROCEDURE (OUTPATIENT)
Dept: MAMMOGRAPHY | Facility: CLINIC | Age: 55
End: 2025-01-08
Attending: INTERNAL MEDICINE
Payer: COMMERCIAL

## 2025-01-08 ENCOUNTER — OFFICE VISIT (OUTPATIENT)
Dept: INTERNAL MEDICINE | Facility: CLINIC | Age: 55
End: 2025-01-08
Payer: COMMERCIAL

## 2025-01-08 VITALS
WEIGHT: 127.5 LBS | BODY MASS INDEX: 22.59 KG/M2 | HEART RATE: 84 BPM | SYSTOLIC BLOOD PRESSURE: 128 MMHG | RESPIRATION RATE: 14 BRPM | HEIGHT: 63 IN | DIASTOLIC BLOOD PRESSURE: 78 MMHG | OXYGEN SATURATION: 99 % | TEMPERATURE: 98.7 F

## 2025-01-08 DIAGNOSIS — I10 BENIGN ESSENTIAL HYPERTENSION: ICD-10-CM

## 2025-01-08 DIAGNOSIS — E78.5 HYPERLIPIDEMIA LDL GOAL <100: ICD-10-CM

## 2025-01-08 DIAGNOSIS — Z00.00 ENCOUNTER FOR ROUTINE ADULT HEALTH EXAMINATION WITHOUT ABNORMAL FINDINGS: Primary | ICD-10-CM

## 2025-01-08 DIAGNOSIS — Z12.31 VISIT FOR SCREENING MAMMOGRAM: ICD-10-CM

## 2025-01-08 DIAGNOSIS — Z12.11 COLON CANCER SCREENING: ICD-10-CM

## 2025-01-08 DIAGNOSIS — E11.9 TYPE 2 DIABETES MELLITUS WITHOUT COMPLICATION, WITHOUT LONG-TERM CURRENT USE OF INSULIN (H): ICD-10-CM

## 2025-01-08 LAB
ALBUMIN SERPL BCG-MCNC: 4.6 G/DL (ref 3.5–5.2)
ALP SERPL-CCNC: 80 U/L (ref 40–150)
ALT SERPL W P-5'-P-CCNC: 26 U/L (ref 0–50)
ANION GAP SERPL CALCULATED.3IONS-SCNC: 13 MMOL/L (ref 7–15)
AST SERPL W P-5'-P-CCNC: 26 U/L (ref 0–45)
BILIRUB SERPL-MCNC: 0.7 MG/DL
BUN SERPL-MCNC: 8.5 MG/DL (ref 6–20)
CALCIUM SERPL-MCNC: 9.8 MG/DL (ref 8.8–10.4)
CHLORIDE SERPL-SCNC: 99 MMOL/L (ref 98–107)
CHOLEST SERPL-MCNC: 153 MG/DL
CREAT SERPL-MCNC: 0.52 MG/DL (ref 0.51–0.95)
CREAT UR-MCNC: 121 MG/DL
EGFRCR SERPLBLD CKD-EPI 2021: >90 ML/MIN/1.73M2
ERYTHROCYTE [DISTWIDTH] IN BLOOD BY AUTOMATED COUNT: 11.8 % (ref 10–15)
EST. AVERAGE GLUCOSE BLD GHB EST-MCNC: 246 MG/DL
FASTING STATUS PATIENT QL REPORTED: YES
FASTING STATUS PATIENT QL REPORTED: YES
GLUCOSE SERPL-MCNC: 158 MG/DL (ref 70–99)
HBA1C MFR BLD: 10.2 % (ref 0–5.6)
HCO3 SERPL-SCNC: 27 MMOL/L (ref 22–29)
HCT VFR BLD AUTO: 39.2 % (ref 35–47)
HDLC SERPL-MCNC: 36 MG/DL
HGB BLD-MCNC: 13.6 G/DL (ref 11.7–15.7)
LDLC SERPL CALC-MCNC: 86 MG/DL
MCH RBC QN AUTO: 29.4 PG (ref 26.5–33)
MCHC RBC AUTO-ENTMCNC: 34.7 G/DL (ref 31.5–36.5)
MCV RBC AUTO: 85 FL (ref 78–100)
MICROALBUMIN UR-MCNC: 1118 MG/L
MICROALBUMIN/CREAT UR: 923.97 MG/G CR (ref 0–25)
NONHDLC SERPL-MCNC: 117 MG/DL
PLATELET # BLD AUTO: 318 10E3/UL (ref 150–450)
POTASSIUM SERPL-SCNC: 4 MMOL/L (ref 3.4–5.3)
PROT SERPL-MCNC: 7.8 G/DL (ref 6.4–8.3)
RBC # BLD AUTO: 4.62 10E6/UL (ref 3.8–5.2)
SODIUM SERPL-SCNC: 139 MMOL/L (ref 135–145)
TRIGL SERPL-MCNC: 156 MG/DL
WBC # BLD AUTO: 7.4 10E3/UL (ref 4–11)

## 2025-01-08 PROCEDURE — 77063 BREAST TOMOSYNTHESIS BI: CPT | Mod: TC | Performed by: RADIOLOGY

## 2025-01-08 PROCEDURE — 77067 SCR MAMMO BI INCL CAD: CPT | Mod: TC | Performed by: RADIOLOGY

## 2025-01-08 RX ORDER — AMLODIPINE BESYLATE 5 MG/1
5 TABLET ORAL DAILY
Qty: 90 TABLET | Refills: 3 | Status: SHIPPED | OUTPATIENT
Start: 2025-01-08

## 2025-01-08 SDOH — HEALTH STABILITY: PHYSICAL HEALTH: ON AVERAGE, HOW MANY MINUTES DO YOU ENGAGE IN EXERCISE AT THIS LEVEL?: 30 MIN

## 2025-01-08 SDOH — HEALTH STABILITY: PHYSICAL HEALTH: ON AVERAGE, HOW MANY DAYS PER WEEK DO YOU ENGAGE IN MODERATE TO STRENUOUS EXERCISE (LIKE A BRISK WALK)?: 5 DAYS

## 2025-01-08 ASSESSMENT — SOCIAL DETERMINANTS OF HEALTH (SDOH): HOW OFTEN DO YOU GET TOGETHER WITH FRIENDS OR RELATIVES?: PATIENT DECLINED

## 2025-01-08 NOTE — PROGRESS NOTES
Preventive Care Visit  [facility]  Arsenio Smith MD, Internal Medicine  Jan 8, 2025      Assessment & Plan     Encounter for routine adult health examination without abnormal findings  Advised patient to consider updating routine vaccinations as listed.  She will discuss with her insurance.  - CBC with platelets; Future  - Comprehensive metabolic panel; Future  - Lipid Profile; Future    Type 2 diabetes mellitus without complication, without long-term current use of insulin (H)  Recheck labs and continue with current medical management as noted.  Advised annual eye exam.  - Comprehensive metabolic panel; Future  - Hemoglobin A1c; Future  - Albumin Random Urine Quantitative with Creat Ratio; Future  - amLODIPine (NORVASC) 5 MG tablet; Take 1 tablet (5 mg) by mouth daily.  - metFORMIN (GLUCOPHAGE) 500 MG tablet; Take 2 tablets (1,000 mg) by mouth 2 times daily (with meals).    Benign essential hypertension  Stable continue with current medical management for  - Comprehensive metabolic panel; Future  - amLODIPine (NORVASC) 5 MG tablet; Take 1 tablet (5 mg) by mouth daily.    Hyperlipidemia LDL goal <100  Labs ordered as fasting and continuing with current statin therapy.  - Lipid Profile; Future    Visit for screening mammogram  Mammography scheduled and updated per patient.    Colon cancer screening  Reviewed updated colonoscopy and suggest FIT testing  - Fecal colorectal cancer screen FIT; Future    Patient has been advised of split billing requirements and indicates understanding: Yes        Counseling  Appropriate preventive services were addressed with this patient via screening, questionnaire, or discussion as appropriate for fall prevention, nutrition, physical activity, Tobacco-use cessation, social engagement, weight loss and cognition.  Checklist reviewing preventive services available has been given to the patient.  Reviewed patient's diet, addressing concerns and/or questions.        See Patient Instructions    Subjective   Theresa is a 55 year old, presenting for the following:  Physical       Via the Health Maintenance questionnaire, the patient has reported the following services have been completed -Eye Exam: [facility] 2024-11-08, this information has been sent to the abstraction team.    HPI    Annual exam    Perimenopause symptoms (irregular periods and hot flashes x 1 year)  Optometry (Jamia Major with [facility]) requesting diabetic lab results        Health Care Directive  Patient does not have a Health Care Directive: none       1/8/2025   General Health   How would you rate your overall physical health? Good   Feel stress (tense, anxious, or unable to sleep) Not at all         1/8/2025   Nutrition   Three or more servings of calcium each day? Yes   Diet: Low fat/cholesterol    Vegetarian/vegan   How many servings of fruit and vegetables per day? (!) 2-3   How many sweetened beverages each day? (!) I DON'T KNOW       Multiple values from one day are sorted in reverse-chronological order         1/8/2025   Exercise   Days per week of moderate/strenous exercise 5 days   Average minutes spent exercising at this level 30 min         1/8/2025   Social Factors   Frequency of gathering with friends or relatives Patient declined   Worry food won't last until get money to buy more No   Food not last or not have enough money for food? No   Do you have housing? (Housing is defined as stable permanent housing and does not include staying ouside in a car, in a tent, in an abandoned building, in an overnight shelter, or couch-surfing.) Yes   Are you worried about losing your housing? No   Lack of transportation? No   Unable to get utilities (heat,electricity)? No         1/8/2025   Fall Risk   Fallen 2 or more times in the past year? No   Trouble with walking or balance? No          1/8/2025   Dental   Dentist two times every year? Yes         1/8/2025   TB Screening   Were you born outside  of the [place]? Yes         Today's PHQ-2 Score:       1/8/2025     9:20 AM   PHQ-2 ( 1999 Pfizer)   Q1: Little interest or pleasure in doing things 0   Q2: Feeling down, depressed or hopeless 0   PHQ-2 Score 0    Q1: Little interest or pleasure in doing things Not at all   Q2: Feeling down, depressed or hopeless Not at all   PHQ-2 Score 0       Patient-reported           1/8/2025   Substance Use   Alcohol more than 3/day or more than 7/wk No   Do you use any other substances recreationally? No     Social History     Tobacco Use    Smoking status: Never    Smokeless tobacco: Never   Vaping Use    Vaping status: Never Used   Substance Use Topics    Alcohol use: No    Drug use: No         9/7/2023   LAST FHS-7 RESULTS   1st degree relative breast or ovarian cancer No   Any relative bilateral breast cancer No   Any male have breast cancer No   Any ONE woman have BOTH breast AND ovarian cancer No   Any woman with breast cancer before 50yrs No   2 or more relatives with breast AND/OR ovarian cancer No   2 or more relatives with breast AND/OR bowel cancer No       Mammogram Screening - Mammogram every 1-2 years updated in Health Maintenance based on mutual decision making        1/8/2025   STI Screening   New sexual partner(s) since last STI/HIV test? No     History of abnormal Pap smear: No - age 30- 64 PAP with HPV every 5 years recommended        Latest Ref Rng & Units 10/7/2024     8:34 AM 9/22/2022     3:12 PM 3/31/2022     2:44 PM   PAP / HPV   PAP  Negative for Intraepithelial Lesion or Malignancy (NILM)  Negative for Intraepithelial Lesion or Malignancy (NILM)  Atypical squamous cells of undetermined significance (ASC-US)    HPV 16 DNA Negative Negative  Negative  Negative    HPV 18 DNA Negative Negative  Negative  Negative    Other HR HPV Negative Negative  Positive  Negative      ASCVD Risk   The 10-year ASCVD risk score (Ji LOUIS, et al., 2019) is: 6.4%    Values used to calculate the score:      Age: 55  "years      Sex: Female      Is Non- : No      Diabetic: Yes      Tobacco smoker: No      Systolic Blood Pressure: 148 mmHg      Is BP treated: Yes      HDL Cholesterol: 39 mg/dL      Total Cholesterol: 163 mg/dL      Reviewed and updated as needed this visit by Provider                    Lab work is in process      Review of Systems  CONSTITUTIONAL: NEGATIVE for fever, chills, change in weight  EYES: NEGATIVE for vision changes or irritation  ENT/MOUTH: NEGATIVE for ear, mouth and throat problems  RESP: NEGATIVE for significant cough or SOB  CV: NEGATIVE for chest pain, palpitations or peripheral edema  GI: NEGATIVE for nausea, abdominal pain, heartburn, or change in bowel habits  : NEGATIVE for frequency, dysuria, or hematuria  MUSCULOSKELETAL: NEGATIVE for significant arthralgias or myalgia  NEURO: NEGATIVE for weakness, dizziness or paresthesias  ENDOCRINE: NEGATIVE for temperature intolerance, skin/hair changes  HEME: NEGATIVE for bleeding problems  PSYCHIATRIC: NEGATIVE for changes in mood or affect     Objective    Exam  /78   Pulse 84   Temp 98.7  F (37.1  C) (Temporal)   Resp 14   Ht 1.6 m (5' 3\")   Wt 57.8 kg (127 lb 8 oz)   LMP 12/08/2024 (Exact Date)   SpO2 99%   BMI 22.59 kg/m     Estimated body mass index is 22.59 kg/m  as calculated from the following:    Height as of this encounter: 1.6 m (5' 3\").    Weight as of this encounter: 57.8 kg (127 lb 8 oz).    Physical Exam  GENERAL: alert and no distress  EYES: Eyes grossly normal to inspection, PERRL and conjunctivae and sclerae normal  HENT: ear canals and TM's normal, nose and mouth without ulcers or lesions  NECK: no adenopathy, no asymmetry, masses, or scars  RESP: lungs clear to auscultation - no rales, rhonchi or wheezes  CV: regular rate and rhythm, normal S1 S2, no S3 or S4, no click or rub, no peripheral edema  ABDOMEN: soft, nontender, no hepatosplenomegaly, no masses and bowel sounds normal  NEURO: No " focal changes  PSYCH: mentation appears normal, affect normal/bright        Signed Electronically by: Arsenio Smith MD

## 2025-01-14 LAB — HEMOCCULT STL QL IA: NEGATIVE

## 2025-02-10 DIAGNOSIS — E11.9 TYPE 2 DIABETES MELLITUS WITHOUT COMPLICATION, WITHOUT LONG-TERM CURRENT USE OF INSULIN (H): ICD-10-CM

## 2025-02-10 RX ORDER — GLIMEPIRIDE 4 MG/1
TABLET ORAL
Qty: 90 TABLET | Refills: 1 | Status: SHIPPED | OUTPATIENT
Start: 2025-02-10

## 2025-02-10 NOTE — TELEPHONE ENCOUNTER
Prescription approved per [facility] Refill Protocol.  Clara Alex RN  [facility]

## 2025-07-12 ENCOUNTER — HEALTH MAINTENANCE LETTER (OUTPATIENT)
Age: 55
End: 2025-07-12

## 2025-08-09 DIAGNOSIS — E11.9 TYPE 2 DIABETES MELLITUS WITHOUT COMPLICATION, WITHOUT LONG-TERM CURRENT USE OF INSULIN (H): ICD-10-CM

## 2025-08-11 RX ORDER — GLIMEPIRIDE 4 MG/1
TABLET ORAL
Qty: 90 TABLET | Refills: 1 | Status: SHIPPED | OUTPATIENT
Start: 2025-08-11

## (undated) DEVICE — Device

## (undated) DEVICE — DRAPE UNDER BUTTOCK 89415

## (undated) DEVICE — LINEN FULL SHEET 5511

## (undated) DEVICE — BASIN SET MINOR DISP

## (undated) DEVICE — PREP CHLORHEXIDINE 4% 4OZ (HIBICLENS) 57504

## (undated) DEVICE — PACK MINOR LITHOTOMY RIDGES

## (undated) DEVICE — GOWN IMPERVIOUS SPECIALTY XLG/XLONG 32474

## (undated) DEVICE — KIT PROCEDURE FLUENT IN/OUT FLOWPAK TISS TRAP FLT-112S

## (undated) DEVICE — LINEN DRAPE 54X72" 5467

## (undated) DEVICE — SOL NACL 0.9% IRRIG 1000ML BOTTLE 2F7124

## (undated) DEVICE — GLOVE BIOGEL PI MICRO SZ 7.0 48570

## (undated) DEVICE — GLOVE BIOGEL PI MICRO SZ 7.5 48575

## (undated) DEVICE — BAG CLEAR TRASH 1.3M 39X33" P4040C

## (undated) DEVICE — SEAL SET MYOSURE ROD LENS SCOPE SINGLE USE 40-902

## (undated) DEVICE — SOL NACL 0.9% IRRIG 3000ML BAG 2B7477

## (undated) DEVICE — LINEN HALF SHEET 5512